# Patient Record
Sex: FEMALE | Race: WHITE | NOT HISPANIC OR LATINO | Employment: UNEMPLOYED | URBAN - METROPOLITAN AREA
[De-identification: names, ages, dates, MRNs, and addresses within clinical notes are randomized per-mention and may not be internally consistent; named-entity substitution may affect disease eponyms.]

---

## 2023-05-22 LAB
ANION GAP IN SER/PLAS: 10 MMOL/L (ref 10–20)
CALCIUM (MG/DL) IN SER/PLAS: 10.5 MG/DL (ref 8.6–10.6)
CARBON DIOXIDE, TOTAL (MMOL/L) IN SER/PLAS: 28 MMOL/L (ref 21–32)
CHLORIDE (MMOL/L) IN SER/PLAS: 105 MMOL/L (ref 98–107)
CHOLESTEROL (MG/DL) IN SER/PLAS: 211 MG/DL (ref 0–199)
CHOLESTEROL IN HDL (MG/DL) IN SER/PLAS: 56.1 MG/DL
CHOLESTEROL/HDL RATIO: 3.8
CREATININE (MG/DL) IN SER/PLAS: 0.75 MG/DL (ref 0.5–1.05)
ESTIMATED AVERAGE GLUCOSE FOR HBA1C: 143 MG/DL
GFR FEMALE: >90 ML/MIN/1.73M2
GLUCOSE (MG/DL) IN SER/PLAS: 123 MG/DL (ref 74–99)
HEMOGLOBIN A1C/HEMOGLOBIN TOTAL IN BLOOD: 6.6 %
LDL: 134 MG/DL (ref 0–99)
POTASSIUM (MMOL/L) IN SER/PLAS: 4.1 MMOL/L (ref 3.5–5.3)
SODIUM (MMOL/L) IN SER/PLAS: 139 MMOL/L (ref 136–145)
THYROTROPIN (MIU/L) IN SER/PLAS BY DETECTION LIMIT <= 0.05 MIU/L: 1.5 MIU/L (ref 0.44–3.98)
TRIGLYCERIDE (MG/DL) IN SER/PLAS: 107 MG/DL (ref 0–149)
UREA NITROGEN (MG/DL) IN SER/PLAS: 9 MG/DL (ref 6–23)
VLDL: 21 MG/DL (ref 0–40)

## 2023-07-19 LAB
APPEARANCE, URINE: NORMAL
BASOPHILS (10*3/UL) IN BLOOD BY AUTOMATED COUNT: 0.07 X10E9/L (ref 0–0.1)
BASOPHILS/100 LEUKOCYTES IN BLOOD BY AUTOMATED COUNT: 0.9 % (ref 0–2)
BILIRUBIN, URINE: NEGATIVE
BLOOD, URINE: NEGATIVE
CALCIDIOL (25 OH VITAMIN D3) (NG/ML) IN SER/PLAS: 22 NG/ML
COLOR, URINE: YELLOW
EOSINOPHILS (10*3/UL) IN BLOOD BY AUTOMATED COUNT: 0.09 X10E9/L (ref 0–0.7)
EOSINOPHILS/100 LEUKOCYTES IN BLOOD BY AUTOMATED COUNT: 1.1 % (ref 0–6)
ERYTHROCYTE DISTRIBUTION WIDTH (RATIO) BY AUTOMATED COUNT: 13.1 % (ref 11.5–14.5)
ERYTHROCYTE MEAN CORPUSCULAR HEMOGLOBIN CONCENTRATION (G/DL) BY AUTOMATED: 31.7 G/DL (ref 32–36)
ERYTHROCYTE MEAN CORPUSCULAR VOLUME (FL) BY AUTOMATED COUNT: 89 FL (ref 80–100)
ERYTHROCYTES (10*6/UL) IN BLOOD BY AUTOMATED COUNT: 4.58 X10E12/L (ref 4–5.2)
GLUCOSE, URINE: NEGATIVE MG/DL
HEMATOCRIT (%) IN BLOOD BY AUTOMATED COUNT: 40.7 % (ref 36–46)
HEMOGLOBIN (G/DL) IN BLOOD: 12.9 G/DL (ref 12–16)
IMMATURE GRANULOCYTES/100 LEUKOCYTES IN BLOOD BY AUTOMATED COUNT: 0.3 % (ref 0–0.9)
KETONES, URINE: NEGATIVE MG/DL
LEUKOCYTE ESTERASE, URINE: NEGATIVE
LEUKOCYTES (10*3/UL) IN BLOOD BY AUTOMATED COUNT: 7.9 X10E9/L (ref 4.4–11.3)
LYMPHOCYTES (10*3/UL) IN BLOOD BY AUTOMATED COUNT: 2 X10E9/L (ref 1.2–4.8)
LYMPHOCYTES/100 LEUKOCYTES IN BLOOD BY AUTOMATED COUNT: 25.2 % (ref 13–44)
MONOCYTES (10*3/UL) IN BLOOD BY AUTOMATED COUNT: 0.61 X10E9/L (ref 0.1–1)
MONOCYTES/100 LEUKOCYTES IN BLOOD BY AUTOMATED COUNT: 7.7 % (ref 2–10)
NEUTROPHILS (10*3/UL) IN BLOOD BY AUTOMATED COUNT: 5.15 X10E9/L (ref 1.2–7.7)
NEUTROPHILS/100 LEUKOCYTES IN BLOOD BY AUTOMATED COUNT: 64.8 % (ref 40–80)
NITRITE, URINE: NEGATIVE
NRBC (PER 100 WBCS) BY AUTOMATED COUNT: 0 /100 WBC (ref 0–0)
PH, URINE: 6 (ref 5–8)
PLATELETS (10*3/UL) IN BLOOD AUTOMATED COUNT: 356 X10E9/L (ref 150–450)
PROTEIN, URINE: NEGATIVE MG/DL
SPECIFIC GRAVITY, URINE: 1.02 (ref 1–1.03)
UROBILINOGEN, URINE: <2 MG/DL (ref 0–1.9)

## 2023-11-20 ENCOUNTER — TELEPHONE (OUTPATIENT)
Dept: PRIMARY CARE | Facility: CLINIC | Age: 63
End: 2023-11-20
Payer: COMMERCIAL

## 2023-11-20 DIAGNOSIS — E03.9 HYPOTHYROIDISM, UNSPECIFIED TYPE: Primary | ICD-10-CM

## 2023-11-20 RX ORDER — LEVOTHYROXINE SODIUM 125 UG/1
125 TABLET ORAL DAILY
COMMUNITY
End: 2023-11-20 | Stop reason: SDUPTHER

## 2023-11-20 RX ORDER — LEVOTHYROXINE SODIUM 125 UG/1
125 TABLET ORAL DAILY
Qty: 90 TABLET | Refills: 3 | Status: SHIPPED | OUTPATIENT
Start: 2023-11-20 | End: 2024-11-19

## 2023-11-21 ENCOUNTER — HOSPITAL ENCOUNTER (OUTPATIENT)
Dept: RADIOLOGY | Facility: EXTERNAL LOCATION | Age: 63
Discharge: HOME | End: 2023-11-21

## 2023-11-21 ENCOUNTER — OFFICE VISIT (OUTPATIENT)
Dept: PRIMARY CARE | Facility: CLINIC | Age: 63
End: 2023-11-21
Payer: COMMERCIAL

## 2023-11-21 VITALS
DIASTOLIC BLOOD PRESSURE: 77 MMHG | SYSTOLIC BLOOD PRESSURE: 95 MMHG | BODY MASS INDEX: 31.98 KG/M2 | WEIGHT: 187.3 LBS | TEMPERATURE: 97.7 F | HEART RATE: 105 BPM | OXYGEN SATURATION: 97 % | HEIGHT: 64 IN

## 2023-11-21 DIAGNOSIS — R92.8 ABNORMAL MAMMOGRAM: ICD-10-CM

## 2023-11-21 DIAGNOSIS — Z12.39 ENCOUNTER FOR SCREENING FOR MALIGNANT NEOPLASM OF BREAST, UNSPECIFIED SCREENING MODALITY: ICD-10-CM

## 2023-11-21 DIAGNOSIS — E11.9 TYPE 2 DIABETES MELLITUS WITHOUT COMPLICATION, WITHOUT LONG-TERM CURRENT USE OF INSULIN (MULTI): Primary | ICD-10-CM

## 2023-11-21 DIAGNOSIS — Z00.00 ANNUAL PHYSICAL EXAM: ICD-10-CM

## 2023-11-21 DIAGNOSIS — I10 HYPERTENSION, UNSPECIFIED TYPE: ICD-10-CM

## 2023-11-21 DIAGNOSIS — R01.1 MURMUR: ICD-10-CM

## 2023-11-21 PROCEDURE — 3044F HG A1C LEVEL LT 7.0%: CPT | Performed by: NURSE PRACTITIONER

## 2023-11-21 PROCEDURE — 3078F DIAST BP <80 MM HG: CPT | Performed by: NURSE PRACTITIONER

## 2023-11-21 PROCEDURE — 99214 OFFICE O/P EST MOD 30 MIN: CPT | Performed by: NURSE PRACTITIONER

## 2023-11-21 PROCEDURE — 1036F TOBACCO NON-USER: CPT | Performed by: NURSE PRACTITIONER

## 2023-11-21 PROCEDURE — 3074F SYST BP LT 130 MM HG: CPT | Performed by: NURSE PRACTITIONER

## 2023-11-21 PROCEDURE — 4010F ACE/ARB THERAPY RXD/TAKEN: CPT | Performed by: NURSE PRACTITIONER

## 2023-11-21 RX ORDER — MULTIVIT-MIN/IRON FUM/FOLIC AC 7.5 MG-4
1 TABLET ORAL DAILY
COMMUNITY
End: 2024-02-26 | Stop reason: ALTCHOICE

## 2023-11-21 RX ORDER — CHOLECALCIFEROL (VITAMIN D3) 25 MCG
1000 TABLET ORAL 2 TIMES DAILY
COMMUNITY
Start: 2022-08-18 | End: 2024-02-26 | Stop reason: ALTCHOICE

## 2023-11-21 RX ORDER — LISINOPRIL 2.5 MG/1
2.5 TABLET ORAL DAILY
COMMUNITY
Start: 2023-11-14 | End: 2024-01-31 | Stop reason: WASHOUT

## 2023-11-21 ASSESSMENT — PATIENT HEALTH QUESTIONNAIRE - PHQ9
2. FEELING DOWN, DEPRESSED OR HOPELESS: NOT AT ALL
SUM OF ALL RESPONSES TO PHQ9 QUESTIONS 1 AND 2: 0
1. LITTLE INTEREST OR PLEASURE IN DOING THINGS: NOT AT ALL

## 2023-11-21 ASSESSMENT — ENCOUNTER SYMPTOMS
NERVOUS/ANXIOUS: 0
SHORTNESS OF BREATH: 0
ABDOMINAL PAIN: 0
ARTHRALGIAS: 0
DYSURIA: 0
WHEEZING: 0
VOMITING: 0
HEMATURIA: 0
APPETITE CHANGE: 0
CONSTIPATION: 0
EYE PAIN: 0
EYE ITCHING: 0
NUMBNESS: 0
WOUND: 0
ABDOMINAL DISTENTION: 0
UNEXPECTED WEIGHT CHANGE: 0
CHILLS: 0
DIARRHEA: 0
COUGH: 0
ACTIVITY CHANGE: 0
FEVER: 0
FATIGUE: 0
FREQUENCY: 0
PALPITATIONS: 0

## 2023-11-21 NOTE — PATIENT INSTRUCTIONS
1. Monitor blood pressure x 1-2 weeks and call with result   2.Obtain blood work   3. Schedule for mammogram   4.Schedule echo to further evaluate the cardiac murmur

## 2023-11-21 NOTE — PROGRESS NOTES
Chief Complaint  Hypertension.    History Of Present Illness  Kimberley Murillo is a 62 y.o. female presents today for follow up of Hypertension and diabetes.    States BP Has been slightly higher than baseline over last 3 days.  Has been experiencing more stress.  Today did take one extra dose of lisinopril.  Tolerating Medication and is compliant.         Diabetes  -Managed by lifestyle and diet. Hemoglobin A1c on 5/22/2023 was 6.6.   -working on improving dietary choices   -Ophthalmology follow up yearly in [place]-due in December 2024.   -Denies numbness, tingling, parasthesias    HM  Hx of abnormal mammogram in past.  Worked up w diagnostic mammogram and US- Right breast with ? Architectural distortion/focal asymmetry -a 6-month follow-up was recommended- did not complete - states plans to complete now  Colonoscopy completed 3/2023- due in 3033     Review of Systems  Review of Systems   Constitutional:  Negative for activity change, appetite change, chills, fatigue, fever and unexpected weight change.   HENT:  Negative for congestion.    Eyes:  Negative for pain and itching.   Respiratory:  Negative for cough, shortness of breath and wheezing.    Cardiovascular:  Negative for chest pain, palpitations and leg swelling.   Gastrointestinal:  Negative for abdominal distention, abdominal pain, constipation, diarrhea and vomiting.   Genitourinary:  Negative for dysuria, frequency, hematuria and urgency.   Musculoskeletal:  Negative for arthralgias and gait problem.   Skin:  Negative for rash and wound.   Neurological:  Negative for numbness.   Psychiatric/Behavioral:  The patient is not nervous/anxious.        Past Medical History  She has a past medical history of Headache, unspecified, Personal history of diseases of the blood and blood-forming organs and certain disorders involving the immune mechanism, and Personal history of pneumonia (recurrent).    Surgical History  She has a past surgical history that includes  "Other surgical history (11/03/2022); Other surgical history (11/03/2022); Other surgical history (11/03/2022); Other surgical history (11/03/2022); Other surgical history (11/03/2022); Other surgical history (11/03/2022); and Other surgical history (11/03/2022).    Family History  No family history on file.     Social History  She reports that she has never smoked. She has never used smokeless tobacco. No history on file for alcohol use and drug use.    Allergies  Meloxicam, Salsalate, Statins-hmg-coa reductase inhibitors, Adhesive, and Latex    Medications  Current Outpatient Medications   Medication Instructions    cholecalciferol (Vitamin D-3) 25 MCG (1000 UT) tablet 1 tablet, oral, Daily    lisinopril 2.5 mg, oral, Daily    multivitamin with minerals tablet 1 tablet, oral, Daily    Unithroid 125 mcg, oral, Daily        Objective   BP 95/77   Pulse 105   Temp 36.5 °C (97.7 °F)   Ht 1.626 m (5' 4\")   Wt 85 kg (187 lb 4.8 oz)   SpO2 97%   BMI 32.15 kg/m²    BMI: Estimated body mass index is 32.15 kg/m² as calculated from the following:    Height as of this encounter: 1.626 m (5' 4\").    Weight as of this encounter: 85 kg (187 lb 4.8 oz).    Physical Exam  Physical Exam  Vitals and nursing note reviewed.   Constitutional:       Appearance: Normal appearance.   HENT:      Head: Normocephalic and atraumatic.      Nose: Nose normal.      Mouth/Throat:      Mouth: Mucous membranes are moist.      Pharynx: Oropharynx is clear.   Eyes:      Extraocular Movements: Extraocular movements intact.      Conjunctiva/sclera: Conjunctivae normal.      Pupils: Pupils are equal, round, and reactive to light.      Comments: glasses   Cardiovascular:      Rate and Rhythm: Normal rate and regular rhythm.      Pulses: Normal pulses.      Heart sounds: Murmur heard.   Pulmonary:      Effort: Pulmonary effort is normal.      Breath sounds: Normal breath sounds.   Abdominal:      General: Bowel sounds are normal.      Palpations: " Abdomen is soft.      Tenderness: There is no abdominal tenderness.   Musculoskeletal:         General: Normal range of motion.      Cervical back: Neck supple.   Skin:     General: Skin is warm and dry.   Neurological:      General: No focal deficit present.      Mental Status: She is alert and oriented to person, place, and time. Mental status is at baseline.   Psychiatric:         Mood and Affect: Mood normal.         Behavior: Behavior normal.         Thought Content: Thought content normal.         Judgment: Judgment normal.         Relevant Results and Imaging  Orders Only on 07/19/2023   Component Date Value Ref Range Status    WBC 07/19/2023 7.9  4.4 - 11.3 x10E9/L Final    nRBC 07/19/2023 0.0  0.0 - 0.0 /100 WBC Final    RBC 07/19/2023 4.58  4.00 - 5.20 x10E12/L Final    Hemoglobin 07/19/2023 12.9  12.0 - 16.0 g/dL Final    Hematocrit 07/19/2023 40.7  36.0 - 46.0 % Final    MCV 07/19/2023 89  80 - 100 fL Final    MCHC 07/19/2023 31.7 (L)  32.0 - 36.0 g/dL Final    Platelets 07/19/2023 356  150 - 450 x10E9/L Final    RDW 07/19/2023 13.1  11.5 - 14.5 % Final    Neutrophils % 07/19/2023 64.8  40.0 - 80.0 % Final    Immature Granulocytes %, Automated 07/19/2023 0.3  0.0 - 0.9 % Final    Comment:  Immature Granulocyte Count (IG) includes promyelocytes,    myelocytes and metamyelocytes but does not include bands.   Percent differential counts (%) should be interpreted in the   context of the absolute cell counts (cells/L).      Lymphocytes % 07/19/2023 25.2  13.0 - 44.0 % Final    Monocytes % 07/19/2023 7.7  2.0 - 10.0 % Final    Eosinophils % 07/19/2023 1.1  0.0 - 6.0 % Final    Basophils % 07/19/2023 0.9  0.0 - 2.0 % Final    Neutrophils Absolute 07/19/2023 5.15  1.20 - 7.70 x10E9/L Final    Lymphocytes Absolute 07/19/2023 2.00  1.20 - 4.80 x10E9/L Final    Monocytes Absolute 07/19/2023 0.61  0.10 - 1.00 x10E9/L Final    Eosinophils Absolute 07/19/2023 0.09  0.00 - 0.70 x10E9/L Final    Basophils Absolute  07/19/2023 0.07  0.00 - 0.10 x10E9/L Final    Color, Urine 07/19/2023 YELLOW  STRAW,YELLOW Final    Appearance, Urine 07/19/2023 HAZY  CLEAR Final    Specific Gravity, Urine 07/19/2023 1.018  1.005 - 1.035 Final    pH, Urine 07/19/2023 6.0  5.0 - 8.0 Final    Protein, Urine 07/19/2023 NEGATIVE  NEGATIVE mg/dL Final    Glucose, Urine 07/19/2023 NEGATIVE  NEGATIVE mg/dL Final    Blood, Urine 07/19/2023 NEGATIVE  NEGATIVE Final    Ketones, Urine 07/19/2023 NEGATIVE  NEGATIVE mg/dL Final    Bilirubin, Urine 07/19/2023 NEGATIVE  NEGATIVE Final    Urobilinogen, Urine 07/19/2023 <2.0  0.0 - 1.9 mg/dL Final    Nitrite, Urine 07/19/2023 NEGATIVE  NEGATIVE Final    Leukocyte Esterase, Urine 07/19/2023 NEGATIVE  NEGATIVE Final    Vitamin D, 25-Hydroxy 07/19/2023 22 (A)  ng/mL Final    Comment: .  DEFICIENCY:         < 20   NG/ML  INSUFFICIENCY:      20-29  NG/ML  SUFFICIENCY:         NG/ML    THIS ASSAY ACCURATELY QUANTIFIES THE SUM OF  VITAMIN D3, 25-HYDROXY AND VIT D2,25-HYDROXY.     Orders Only on 05/22/2023   Component Date Value Ref Range Status    Hemoglobin A1C 05/22/2023 6.6 (A)  % Final    Comment:      Diagnosis of Diabetes-Adults   Non-Diabetic: < or = 5.6%   Increased risk for developing diabetes: 5.7-6.4%   Diagnostic of diabetes: > or = 6.5%  .       Monitoring of Diabetes                Age (y)     Therapeutic Goal (%)   Adults:          >18           <7.0   Pediatrics:    13-18           <7.5                   7-12           <8.0                   0- 6            7.5-8.5   American Diabetes Association. Diabetes Care 33(S1), Jan 2010.      Estimated Average Glucose 05/22/2023 143  MG/DL Final   Orders Only on 05/22/2023   Component Date Value Ref Range Status    Cholesterol 05/22/2023 211 (H)  0 - 199 mg/dL Final    Comment: .      AGE      DESIRABLE   BORDERLINE HIGH   HIGH     0-19 Y     0 - 169       170 - 199     >/= 200    20-24 Y     0 - 189       190 - 224     >/= 225         >24 Y     0 - 199        200 - 239     >/= 240   **All ranges are based on fasting samples. Specific   therapeutic targets will vary based on patient-specific   cardiac risk.  .   Pediatric guidelines reference:Pediatrics 2011, 128(S5).   Adult guidelines reference: NCEP ATPIII Guidelines,     WADE 2001, 258:2486-97  .   Venipuncture immediately after or during the    administration of Metamizole may lead to falsely   low results. Testing should be performed immediately   prior to Metamizole dosing.      HDL 05/22/2023 56.1  mg/dL Final    Comment: .      AGE      VERY LOW   LOW     NORMAL    HIGH       0-19 Y       < 35   < 40     40-45     ----    20-24 Y       ----   < 40       >45     ----      >24 Y       ----   < 40     40-60      >60  .      Cholesterol/HDL Ratio 05/22/2023 3.8   Final    Comment: REF VALUES  DESIRABLE  < 3.4  HIGH RISK  > 5.0      LDL 05/22/2023 134 (H)  0 - 99 mg/dL Final    Comment: .                           NEAR      BORD      AGE      DESIRABLE  OPTIMAL    HIGH     HIGH     VERY HIGH     0-19 Y     0 - 109     ---    110-129   >/= 130     ----    20-24 Y     0 - 119     ---    120-159   >/= 160     ----      >24 Y     0 -  99   100-129  130-159   160-189     >/=190  .      VLDL 05/22/2023 21  0 - 40 mg/dL Final    Triglycerides 05/22/2023 107  0 - 149 mg/dL Final    Comment: .      AGE      DESIRABLE   BORDERLINE HIGH   HIGH     VERY HIGH   0 D-90 D    19 - 174         ----         ----        ----  91 D- 9 Y     0 -  74        75 -  99     >/= 100      ----    10-19 Y     0 -  89        90 - 129     >/= 130      ----    20-24 Y     0 - 114       115 - 149     >/= 150      ----         >24 Y     0 - 149       150 - 199    200- 499    >/= 500  .   Venipuncture immediately after or during the    administration of Metamizole may lead to falsely   low results. Testing should be performed immediately   prior to Metamizole dosing.     Orders Only on 05/22/2023   Component Date Value Ref Range Status    TSH  05/22/2023 1.50  0.44 - 3.98 mIU/L Final    Comment:  TSH testing is performed using different testing    methodology at [facility] than at other    [facility] hospitals. Direct result comparisons should    only be made within the same method.     Orders Only on 05/22/2023   Component Date Value Ref Range Status    Glucose 05/22/2023 123 (H)  74 - 99 mg/dL Final    Sodium 05/22/2023 139  136 - 145 mmol/L Final    Potassium 05/22/2023 4.1  3.5 - 5.3 mmol/L Final    Chloride 05/22/2023 105  98 - 107 mmol/L Final    Bicarbonate 05/22/2023 28  21 - 32 mmol/L Final    Anion Gap 05/22/2023 10  10 - 20 mmol/L Final    Urea Nitrogen 05/22/2023 9  6 - 23 mg/dL Final    Creatinine 05/22/2023 0.75  0.50 - 1.05 mg/dL Final    GFR Female 05/22/2023 >90  >90 mL/min/1.73m2 Final    Comment:  CALCULATIONS OF ESTIMATED GFR ARE PERFORMED   USING THE 2021 CKD-EPI STUDY REFIT EQUATION   WITHOUT THE RACE VARIABLE FOR THE IDMS-TRACEABLE   CREATININE METHODS.    https://jasn.asnjournals.org/content/early/2021/09/22/ASN.6333075270      Calcium 05/22/2023 10.5  8.6 - 10.6 mg/dL Final     No images are attached to the encounter.        Assessment and Plan  Assessment/Plan   Problem List Items Addressed This Visit             ICD-10-CM    Type 2 diabetes mellitus without complication, without long-term current use of insulin (CMS/[facility]) - Primary E11.9     -Managed by lifestyle and diet. Hemoglobin A1c on 5/22/2023 was 6.6.   -working on improving dietary choices   -Ophthalmology follow up yearly in [place]-due in December 2024.            Relevant Orders    Comprehensive Metabolic Panel    Hemoglobin A1C    Hypertension I10     Monitor blood pressure x 1-2 weeks and call with result           Relevant Orders    Comprehensive Metabolic Panel    Murmur R01.1     Schedule echo to further evaluate the cardiac murmur          Relevant Orders    Transthoracic Echo (TTE) Complete    Abnormal mammogram R92.8     -diagnostic B mammogram and  US R breast          Relevant Orders    BI mammo bilateral diagnostic tomosynthesis    BI US breast complete right     Other Visit Diagnoses         Codes    Annual physical exam     Z00.00    Relevant Orders    Follow Up In Primary Care - Health Maintenance    Encounter for screening for malignant neoplasm of breast, unspecified screening modality     Z12.39    Relevant Orders    BI mammo bilateral screening tomosynthesis (Completed)

## 2023-11-26 PROBLEM — R92.8 ABNORMAL MAMMOGRAM: Status: ACTIVE | Noted: 2023-11-26

## 2023-11-26 PROBLEM — R01.1 MURMUR: Status: ACTIVE | Noted: 2023-11-26

## 2023-11-26 PROBLEM — I10 HYPERTENSION: Status: ACTIVE | Noted: 2023-11-26

## 2023-11-26 NOTE — ASSESSMENT & PLAN NOTE
-Managed by lifestyle and diet. Hemoglobin A1c on 5/22/2023 was 6.6.   -working on improving dietary choices   -Ophthalmology follow up yearly in [place]-due in December 2024.

## 2023-12-05 ENCOUNTER — ANCILLARY PROCEDURE (OUTPATIENT)
Dept: RADIOLOGY | Facility: CLINIC | Age: 63
End: 2023-12-05
Payer: COMMERCIAL

## 2023-12-05 DIAGNOSIS — R92.8 ABNORMAL MAMMOGRAM: ICD-10-CM

## 2023-12-05 DIAGNOSIS — R92.8 ABNORMAL MAMMOGRAM: Primary | ICD-10-CM

## 2023-12-05 PROCEDURE — 77066 DX MAMMO INCL CAD BI: CPT | Performed by: STUDENT IN AN ORGANIZED HEALTH CARE EDUCATION/TRAINING PROGRAM

## 2023-12-05 PROCEDURE — 76642 ULTRASOUND BREAST LIMITED: CPT | Performed by: STUDENT IN AN ORGANIZED HEALTH CARE EDUCATION/TRAINING PROGRAM

## 2023-12-05 PROCEDURE — 76642 ULTRASOUND BREAST LIMITED: CPT | Mod: RT

## 2023-12-05 PROCEDURE — 77062 BREAST TOMOSYNTHESIS BI: CPT

## 2023-12-05 PROCEDURE — G0279 TOMOSYNTHESIS, MAMMO: HCPCS | Performed by: STUDENT IN AN ORGANIZED HEALTH CARE EDUCATION/TRAINING PROGRAM

## 2023-12-07 ENCOUNTER — HOSPITAL ENCOUNTER (OUTPATIENT)
Dept: CARDIOLOGY | Facility: CLINIC | Age: 63
Discharge: HOME | End: 2023-12-07
Payer: COMMERCIAL

## 2023-12-07 DIAGNOSIS — R01.1 MURMUR: ICD-10-CM

## 2023-12-07 PROCEDURE — 93306 TTE W/DOPPLER COMPLETE: CPT

## 2023-12-07 PROCEDURE — 93306 TTE W/DOPPLER COMPLETE: CPT | Performed by: INTERNAL MEDICINE

## 2023-12-09 LAB
AORTIC VALVE PEAK VELOCITY: 1.59
AV PEAK GRADIENT: 10.1
AVA (PEAK VEL): 3.54
EJECTION FRACTION APICAL 4 CHAMBER: 61.5
EJECTION FRACTION: 62
LEFT ATRIUM VOLUME AREA LENGTH INDEX BSA: 19.7
LEFT VENTRICLE INTERNAL DIMENSION DIASTOLE: 3.83 (ref 3.5–6)
LEFT VENTRICULAR OUTFLOW TRACT DIAMETER: 2.25
MITRAL VALVE E/A RATIO: 1.24
MITRAL VALVE E/E' RATIO: 9.8
RIGHT VENTRICLE FREE WALL PEAK S': 21
RIGHT VENTRICLE PEAK SYSTOLIC PRESSURE: 26.7
TRICUSPID ANNULAR PLANE SYSTOLIC EXCURSION: 2.1

## 2023-12-11 DIAGNOSIS — R92.8 ABNORMAL MAMMOGRAM: ICD-10-CM

## 2023-12-15 ENCOUNTER — TELEPHONE (OUTPATIENT)
Dept: RADIOLOGY | Facility: HOSPITAL | Age: 63
End: 2023-12-15
Payer: COMMERCIAL

## 2023-12-15 NOTE — TELEPHONE ENCOUNTER
left general message about date time location and parking , aware it is okay to eat drink and drive as long as not using sedating medications of have another appointment with different instructions, left nurse line number to call for further instructions.

## 2023-12-15 NOTE — PROGRESS NOTES
[facility]  Kimberley Murillo female   1960 63 y.o.  [id number]      Chief Complaint  New patient, biopsy consultation.    History Of Present Illness  Kimberley Murillo is a very pleasant 63 y.o. female seen in the breast center for biopsy consultation. She has a history of bilateral benign core biopsies at least five total and excision. She has two paternal aunts with breast cancer, 40's.    BREAST IMAGIN2023 Bilateral diagnostic mammogram with right breast ultrasound, indicates BI-RADS Category 4. Indeterminate right breast architectural distortion with questionable sonographic correlates at 9 o'clock 9 cm from the nipple  or 9 o'clock 10 cm from the nipple.  Further evaluation with surgical consultation, repeat real-time right breast and axillary ultrasound with elastography, and stereotactic/tomosynthesis-guided biopsy of the architectural distortion is recommended. Additional ultrasound-guided biopsy may be considered at the discretion of the interpreting radiologist. Oval masses in the right breast at the 9:30 and 10 o'clock positions, with long-term mammographic stability are considered benign. No additional imaging follow-up for this finding is recommended.    REPRODUCTIVE HISTORY: menarche age 17, , first birth age 30, did not breastfeed, OCP's x 3 years, natural menopause age 60, no HRT, scattered fibroglandular tissue    FAMILY CANCER HISTORY:   Paternal Aunt (1): Breast cancer, 40's  Paternal Aunt (2): Breast cancer, 40's    Review of Systems  Constitutional:  Negative for appetite change, fatigue, fever and unexpected weight change.   HENT:  Negative for ear pain, hearing loss, nosebleeds, sore throat and trouble swallowing.    Eyes:  Negative for discharge, itching and visual disturbance.   Breast: As stated in HPI.  Respiratory:  Negative for cough, chest tightness and shortness of breath.    Cardiovascular:  Negative for chest pain, palpitations and leg swelling.    Gastrointestinal:  Negative for abdominal pain, constipation, diarrhea and nausea.   Endocrine: Negative for cold intolerance and heat intolerance.   Genitourinary:  Negative for dysuria, frequency, hematuria, pelvic pain and vaginal bleeding.   Musculoskeletal:  Negative for arthralgias, back pain, gait problem, joint swelling and myalgias.   Skin:  Negative for color change and rash.   Allergic/Immunologic: Negative for environmental allergies and food allergies.   Neurological:  Negative for dizziness, tremors, speech difficulty, weakness, numbness and headaches.   Hematological:  Does not bruise/bleed easily.   Psychiatric/Behavioral:  Negative for agitation, dysphoric mood and sleep disturbance. The patient is not nervous/anxious.       Past Medical History  She has a past medical history of Headache, unspecified, Personal history of diseases of the blood and blood-forming organs and certain disorders involving the immune mechanism, and Personal history of pneumonia (recurrent).    Surgical History  She has a past surgical history that includes Other surgical history (11/03/2022); Other surgical history (11/03/2022); Other surgical history (11/03/2022); Other surgical history (11/03/2022); Other surgical history (11/03/2022); Other surgical history (11/03/2022); and Other surgical history (11/03/2022).    Family History  Cancer-related family history is not on file.     Social History  She reports that she has never smoked. She has never used smokeless tobacco. No history on file for alcohol use and drug use.    Allergies  Meloxicam, Salsalate, Statins-hmg-coa reductase inhibitors, Adhesive, and Latex    Medications  Current Outpatient Medications   Medication Instructions    cholecalciferol (Vitamin D-3) 25 MCG (1000 UT) tablet 1 tablet, oral, Daily    lisinopril 2.5 mg, oral, Daily    multivitamin with minerals tablet 1 tablet, oral, Daily    Unithroid 125 mcg, oral, Daily       Last Recorded Vitals  Pulse  84, temperature 36.2 °C (97.2 °F), weight 81.9 kg (180 lb 9.6 oz).  Patient reports her BP was 120/64 at home    Physical Exam  Patient is alert and oriented x3 and in a relaxed and appropriate mood. Her gait is steady and hand grasps are equal. Sclera is clear. The breasts are nearly symmetrical, large, and pendulous. The tissue is soft without palpable abnormalities, discrete nodules or masses. The skin and nipples appear normal. There is no cervical, supraclavicular or axillary lymphadenopathy. Heart rate and rhythm normal, S1 and S2 appreciated. The lungs are clear to auscultation bilaterally. Abdomen is soft and non-tender.      Relevant Results and Imaging  BI mammo bilateral diagnostic tomosynthesis 12/05/2023  BI US breast limited right 12/05/2023    Narrative  Interpreted By:  Lulu Grider and Avery Ross  STUDY:  BI MAMMO BILATERAL DIAGNOSTIC TOMOSYNTHESIS; BI US BREAST LIMITED  RIGHT;  12/5/2023 9:44 am; 12/5/2023 9:46 am    ACCESSION NUMBER(S):  [id number]; [id number]    ORDERING CLINICIAN:  EVELIA HA    INDICATION:  Annual mammogram and late follow-up of probably benign right breast  architectural distortion/focal asymmetry evaluated on 10/10/2022. The  patient reports focal right breast pain for several months. History  of multiple benign core right breast biopsies and benign excisional  left breast biopsy.    COMPARISON:  10/10/2022, 09/01/2022, 12/14/2014.    FINDINGS:  MAMMOGRAPHY: 2D and tomosynthesis images were reviewed at 1 mm slice  thickness.    Density:  There are areas of scattered fibroglandular tissue.    A radiopaque marker was placed on the skin by the patient at the site  of the patient's focal pain in the superolateral right breast. No  suspicious focal mammographic abnormality is seen underneath the  radiopaque marker. Of note, at the time of ultrasound exam patient  reported that the pain was in central right breast and at 9 o'clock,  10 cm from the nipple.    Tissue  markers are noted in the superolateral and central lateral  right breast at middle depth. Architectural distortion is again seen  in the central lateral right breast at middle depth, which is similar  to prior exams dating back to 09/01/2022. There are stable  circumscribed masses bilaterally.    Postsurgical scarring is seen in the superolateral left breast at  middle depth. No suspicious masses or calcifications are identified  in the left breast.    ULTRASOUND:  A targeted ultrasound of the right breast was performed  by a registered sonographer.    An irregular hypoechoic mass with indistinct margins is seen at the 9  o'clock position 9 cm from the nipple. The mass measures 1.1 x 0.5 x  0.9 cm, previously 0.7 x 0.4 x 0.7 cm on 10/10/2022 with size  difference likely secondary to differences in measurement technique.  There is question of internal vascularity. This may correspond with  the architectural distortion.    Please note there is a discrepancy in the area of focal pain between  the ultrasound and mammogram. The patient clarified the focal pain is  actually at the 9 o'clock position. In the area of the patient's  focal breast pain at the 9 o'clock position 10 cm from the nipple,  there is an irregular hypoechoic mass with posterior shadowing  measuring 1.2 x 1.0 x 0.8 cm. It is avascular. This may correspond  with architectural distortion.    An oval parallel circumscribed hypoechoic mass is identified at the  9:30 o'clock position 9 cm from the nipple. The mass is similar in  size compared to prior exam measuring 1.0 x 0.6 x 0.9 cm. No internal  vascularity is identified. This likely corresponds to a mammographic  mass which appears slightly smaller compared to prior mammogram dated  12/04/2014.    An additional oval parallel circumscribed hypoechoic mass is noted at  the 10 o'clock position 11 cm from the nipple. The mass measures 1.0  x 0.5 x 0.6 cm. It is avascular. This mass corresponds with  a  circumscribed mass in the superolateral right breast at middle depth  on mammogram that has been stable since 12/14/2014.    Impression  1. Indeterminate right breast architectural distortion with  questionable sonographic correlates at 9 o'clock 9 cm from the nipple  or 9 o'clock 10 cm from the nipple.  Further evaluation with surgical  consultation, repeat real-time right breast and axillary ultrasound  with elastography, and stereotactic/tomosynthesis-guided biopsy of  the architectural distortion is recommended. Additional  ultrasound-guided biopsy may be considered at the discretion of the  interpreting radiologist.  2. Oval masses in the right breast at the 9:30 and 10 o'clock  positions, with long-term mammographic stability are considered  benign. No additional imaging follow-up for this finding is  recommended.  3. No mammographic evidence of malignancy in the left breast.    Dr. Lulu Grider explained the findings and recommendations with  the patient at the time of exam via the telephone. A message was sent  to the referring practitioner at the time of this dictation regarding  these critical findings using the Epic notification system. A  pre-procedure form was filled out.    Method of Detection: Category Sdbt - 3D Screening    BI-RADS CATEGORY:    BI-RADS Category:  4 Suspicious.  Recommendation:  Biopsy.  Recommended Date:  Immediate.  Laterality:  Right.    For any future breast imaging appointments, please call [phone]).      MACRO:  Critical Finding:  Breast Imaging Abnormality. Notification was  initiated on 12/5/2023 at 10:18 am by  Ren Licona.  (**-YCF-**)  Instructions:  Surgical Consultation and Imaging Guided Biopsy.    Signed by: Lulu Grider 12/5/2023 11:02 AM  Dictation workstation:   [id number]      Time was spent viewing digital images. I explained the results in depth, along with suggested explanation for follow up recommendations based on the testing results.  BI-RADS Category 4    Visit Diagnosis  1. Abnormal finding on breast imaging          Assessment/Plan  Abnormal mammogram, right breast architectural distortion, benign bilateral core biopsies with cyst excisions, family history of breast cancer, scattered fibroglandular tissue    Plan:  Right breast stereotactic guided core biopsy with second look ultrasound and possible ultrasound guided core biopsy.    Patient Discussion/Summary  I recommend a right breast mammogram guided core biopsy with second look ultrasound and possible ultrasound guided core biopsy. A breast radiology physician will perform the procedure. Possible diagnoses include benign, atypia or cancer. Bruising and mild discomfort after the biopsy is normal and will improve. I typically have results in 5-7 business days. I will call you with the results, please have your phone handy to take my call. I will provide recommendations for future follow up based on your biopsy results.     IMPORTANT INFORMATION REGARDING YOUR RESULTS    If you receive medical information from My [facility] Personal Health Record (online chart) your results will be released into your chart. This means you may view or see results of your biopsy or procedure before I contact you directly. If this occurs, please call the office and we will discuss your results over the phone.    You can see your health information, review clinical summaries from office visits & test results online when you follow your health with MY  Chart, a personal health record. To sign up go to [email or web address]. If you need assistance with signing up or trouble getting into your account call [facility] Patient Line 24/7 at [phone].    My office phone number is [phone]  if you need to get in touch with me or have additional questions or concerns. Thank you for choosing [facility] and trusting me as your healthcare provider. I look forward to seeing you again at your next office  visit. I am honored to be a provider on your health care team and I remain dedicated to helping you achieve your health goals.       Melissa Eubanks, APRN-CNP

## 2023-12-18 ENCOUNTER — APPOINTMENT (OUTPATIENT)
Dept: RADIOLOGY | Facility: HOSPITAL | Age: 63
End: 2023-12-18
Payer: COMMERCIAL

## 2023-12-18 ENCOUNTER — PROCEDURE VISIT (OUTPATIENT)
Dept: SURGICAL ONCOLOGY | Facility: HOSPITAL | Age: 63
End: 2023-12-18
Payer: COMMERCIAL

## 2023-12-18 ENCOUNTER — HOSPITAL ENCOUNTER (OUTPATIENT)
Dept: RADIOLOGY | Facility: HOSPITAL | Age: 63
Discharge: HOME | End: 2023-12-18
Payer: COMMERCIAL

## 2023-12-18 VITALS — TEMPERATURE: 97.2 F | HEART RATE: 84 BPM | BODY MASS INDEX: 31 KG/M2 | WEIGHT: 180.6 LBS

## 2023-12-18 DIAGNOSIS — R92.8 OTHER ABNORMAL AND INCONCLUSIVE FINDINGS ON DIAGNOSTIC IMAGING OF BREAST: ICD-10-CM

## 2023-12-18 DIAGNOSIS — R92.8 ABNORMAL FINDING ON BREAST IMAGING: Primary | ICD-10-CM

## 2023-12-18 DIAGNOSIS — R92.8 ABNORMAL FINDING ON BREAST IMAGING: ICD-10-CM

## 2023-12-18 PROCEDURE — 99204 OFFICE O/P NEW MOD 45 MIN: CPT | Performed by: NURSE PRACTITIONER

## 2023-12-18 PROCEDURE — 96372 THER/PROPH/DIAG INJ SC/IM: CPT | Performed by: RADIOLOGY

## 2023-12-18 PROCEDURE — 19081 BX BREAST 1ST LESION STRTCTC: CPT | Mod: RIGHT SIDE | Performed by: RADIOLOGY

## 2023-12-18 PROCEDURE — 77065 DX MAMMO INCL CAD UNI: CPT

## 2023-12-18 PROCEDURE — 76642 ULTRASOUND BREAST LIMITED: CPT | Mod: RT

## 2023-12-18 PROCEDURE — 77065 DX MAMMO INCL CAD UNI: CPT | Mod: RIGHT SIDE | Performed by: RADIOLOGY

## 2023-12-18 PROCEDURE — 2720000007 HC OR 272 NO HCPCS

## 2023-12-18 PROCEDURE — 76642 ULTRASOUND BREAST LIMITED: CPT | Mod: RIGHT SIDE | Performed by: RADIOLOGY

## 2023-12-18 PROCEDURE — 2500000005 HC RX 250 GENERAL PHARMACY W/O HCPCS: Performed by: RADIOLOGY

## 2023-12-18 PROCEDURE — 76982 USE 1ST TARGET LESION: CPT | Mod: RT

## 2023-12-18 PROCEDURE — 88305 TISSUE EXAM BY PATHOLOGIST: CPT | Performed by: PATHOLOGY

## 2023-12-18 PROCEDURE — 19081 BX BREAST 1ST LESION STRTCTC: CPT | Mod: RT

## 2023-12-18 PROCEDURE — 99214 OFFICE O/P EST MOD 30 MIN: CPT | Mod: 25 | Performed by: NURSE PRACTITIONER

## 2023-12-18 PROCEDURE — 88305 TISSUE EXAM BY PATHOLOGIST: CPT | Mod: TC,SUR | Performed by: NURSE PRACTITIONER

## 2023-12-18 RX ORDER — LIDOCAINE HYDROCHLORIDE 10 MG/ML
10 INJECTION, SOLUTION EPIDURAL; INFILTRATION; INTRACAUDAL; PERINEURAL ONCE
OUTPATIENT
Start: 2023-12-18 | End: 2023-12-18

## 2023-12-18 RX ORDER — LIDOCAINE HYDROCHLORIDE 10 MG/ML
10 INJECTION, SOLUTION EPIDURAL; INFILTRATION; INTRACAUDAL; PERINEURAL ONCE
Status: COMPLETED | OUTPATIENT
Start: 2023-12-18 | End: 2023-12-18

## 2023-12-18 RX ADMIN — LIDOCAINE HYDROCHLORIDE 11 ML: 10 INJECTION, SOLUTION EPIDURAL; INFILTRATION; INTRACAUDAL; PERINEURAL at 09:47

## 2023-12-18 ASSESSMENT — PAIN SCALES - GENERAL
PAINLEVEL: 10-WORST PAIN EVER
PAINLEVEL_OUTOF10: 0 - NO PAIN
PAINLEVEL_OUTOF10: 5 - MODERATE PAIN

## 2023-12-18 ASSESSMENT — PAIN - FUNCTIONAL ASSESSMENT: PAIN_FUNCTIONAL_ASSESSMENT: 0-10

## 2023-12-18 ASSESSMENT — PAIN DESCRIPTION - DESCRIPTORS: DESCRIPTORS: THROBBING

## 2023-12-18 NOTE — Clinical Note
Patient tolerated the right stereotactic bx well:  no pain during procedure:  aromatherapy and some guided imagery given:  Debrief at 10:58 am:  clip films done:  pressure held x 10 minutes and pressure dressing applied.   Patient stated she did much better than her last biopsy which was  15 yrs ago.

## 2023-12-19 ENCOUNTER — TELEPHONE (OUTPATIENT)
Dept: RADIOLOGY | Facility: HOSPITAL | Age: 63
End: 2023-12-19
Payer: COMMERCIAL

## 2023-12-19 NOTE — TELEPHONE ENCOUNTER
Patient called after her biopsy 12/18/23 to say that she was fine and had no complications or pain .

## 2023-12-21 ENCOUNTER — TELEPHONE (OUTPATIENT)
Dept: SURGICAL ONCOLOGY | Facility: HOSPITAL | Age: 63
End: 2023-12-21
Payer: COMMERCIAL

## 2023-12-21 DIAGNOSIS — R92.8 ABNORMAL FINDING ON BREAST IMAGING: Primary | ICD-10-CM

## 2023-12-21 LAB
LABORATORY COMMENT REPORT: NORMAL
PATH REPORT.FINAL DX SPEC: NORMAL
PATH REPORT.GROSS SPEC: NORMAL
PATH REPORT.RELEVANT HX SPEC: NORMAL
PATH REPORT.TOTAL CANCER: NORMAL

## 2023-12-21 NOTE — TELEPHONE ENCOUNTER
"Result Communication    Spoke with Kimberley Murillo regarding breast biopsy results., benign. Awaiting concordance report, patient aware. Office will call to schedule 6 month follow up right breast ultrasound and office visit.       Resulted Orders   Surgical Pathology Exam   Result Value Ref Range    Case Report       Surgical Pathology                                Case: [id number]                                  Authorizing Provider:  BARBARA Kelley    Collected:           12/18/2023 0952              Ordering Location:     [facility]       Received:            12/18/2023 9420                                     Center                                                                       Pathologist:           Argentina Thompson MD                                                         Specimen:    BREAST CORE BIOPSY RIGHT, Right breast architechural distortion                            FINAL DIAGNOSIS       A.  Right breast, stereotactic guided core needle biopsy for architectural distortion:  -- Apocrine metaplasia and cysts.              By the signature on this report, the individual or group listed as making the Final Interpretation/Diagnosis certifies that they have reviewed this case.       Clinical History       Right breast stereotactic guided core biopsy (vacuum assisted) for right breast architechural distortion.      Gross Description       Received in formalin, labeled with the patient´s name and hospital number and \" breast core right\", are multiple irregular/cylindrical segments of yellow-white fatty soft tissue aggregating to 4.9 x 1.1 x 0.6 cm.The specimen is submitted in toto in 3 cassettes.  [facility]    NOTE:    Ischemia time: not provided.  This specimen was placed into formalin at: 0952.         10:50 AM    "

## 2023-12-27 DIAGNOSIS — R92.8 ABNORMAL FINDING ON BREAST IMAGING: Primary | ICD-10-CM

## 2023-12-29 ENCOUNTER — LAB (OUTPATIENT)
Dept: LAB | Facility: LAB | Age: 63
End: 2023-12-29
Payer: COMMERCIAL

## 2023-12-29 DIAGNOSIS — I10 HYPERTENSION, UNSPECIFIED TYPE: ICD-10-CM

## 2023-12-29 DIAGNOSIS — E11.9 TYPE 2 DIABETES MELLITUS WITHOUT COMPLICATION, WITHOUT LONG-TERM CURRENT USE OF INSULIN (MULTI): ICD-10-CM

## 2023-12-29 PROCEDURE — 36415 COLL VENOUS BLD VENIPUNCTURE: CPT

## 2023-12-29 PROCEDURE — 83036 HEMOGLOBIN GLYCOSYLATED A1C: CPT

## 2023-12-29 PROCEDURE — 80053 COMPREHEN METABOLIC PANEL: CPT

## 2023-12-30 LAB
ALBUMIN SERPL BCP-MCNC: 3.9 G/DL (ref 3.4–5)
ALP SERPL-CCNC: 127 U/L (ref 33–136)
ALT SERPL W P-5'-P-CCNC: 13 U/L (ref 7–45)
ANION GAP SERPL CALC-SCNC: 10 MMOL/L (ref 10–20)
AST SERPL W P-5'-P-CCNC: 12 U/L (ref 9–39)
BILIRUB SERPL-MCNC: 0.5 MG/DL (ref 0–1.2)
BUN SERPL-MCNC: 9 MG/DL (ref 6–23)
CALCIUM SERPL-MCNC: 10.2 MG/DL (ref 8.6–10.6)
CHLORIDE SERPL-SCNC: 102 MMOL/L (ref 98–107)
CO2 SERPL-SCNC: 31 MMOL/L (ref 21–32)
CREAT SERPL-MCNC: 0.61 MG/DL (ref 0.5–1.05)
EST. AVERAGE GLUCOSE BLD GHB EST-MCNC: 177 MG/DL
GFR SERPL CREATININE-BSD FRML MDRD: >90 ML/MIN/1.73M*2
GLUCOSE SERPL-MCNC: 134 MG/DL (ref 74–99)
HBA1C MFR BLD: 7.8 %
POTASSIUM SERPL-SCNC: 4.2 MMOL/L (ref 3.5–5.3)
PROT SERPL-MCNC: 6.9 G/DL (ref 6.4–8.2)
SODIUM SERPL-SCNC: 139 MMOL/L (ref 136–145)

## 2024-01-08 DIAGNOSIS — R09.89 DECREASED TISSUE PERFUSION: ICD-10-CM

## 2024-01-08 DIAGNOSIS — M79.601 PAIN OF RIGHT UPPER EXTREMITY: ICD-10-CM

## 2024-01-08 DIAGNOSIS — R20.0 HAND NUMBNESS: ICD-10-CM

## 2024-01-08 DIAGNOSIS — E11.9 TYPE 2 DIABETES MELLITUS WITHOUT COMPLICATION, WITHOUT LONG-TERM CURRENT USE OF INSULIN (MULTI): Primary | ICD-10-CM

## 2024-01-08 DIAGNOSIS — E11.9 TYPE 2 DIABETES MELLITUS WITHOUT COMPLICATION, WITHOUT LONG-TERM CURRENT USE OF INSULIN (MULTI): ICD-10-CM

## 2024-01-08 RX ORDER — METFORMIN HYDROCHLORIDE 500 MG/1
500 TABLET ORAL
Qty: 180 TABLET | Refills: 0 | Status: SHIPPED | OUTPATIENT
Start: 2024-01-08 | End: 2024-01-31 | Stop reason: WASHOUT

## 2024-01-08 RX ORDER — METFORMIN HYDROCHLORIDE 500 MG/1
TABLET ORAL
Qty: 45 TABLET | Refills: 0 | Status: SHIPPED | OUTPATIENT
Start: 2024-01-08 | End: 2024-01-08

## 2024-01-09 ENCOUNTER — TELEPHONE (OUTPATIENT)
Dept: PRIMARY CARE | Facility: CLINIC | Age: 64
End: 2024-01-09
Payer: COMMERCIAL

## 2024-01-09 NOTE — TELEPHONE ENCOUNTER
Patient called asking if there is a different medication than Metformin.  Patient's father took metformin and had kidney failure and patient does not want to follow in his foot steps.  Patient also said to let Asha know her finger tips and down are turning white and getting a little worse. Verna [phone].  Patient phone [phone].

## 2024-01-24 ENCOUNTER — HOSPITAL ENCOUNTER (OUTPATIENT)
Dept: VASCULAR MEDICINE | Facility: CLINIC | Age: 64
Discharge: HOME | End: 2024-01-24
Payer: COMMERCIAL

## 2024-01-24 DIAGNOSIS — R09.89 DECREASED TISSUE PERFUSION: ICD-10-CM

## 2024-01-24 DIAGNOSIS — M79.601 PAIN OF RIGHT UPPER EXTREMITY: ICD-10-CM

## 2024-01-24 DIAGNOSIS — R20.0 HAND NUMBNESS: ICD-10-CM

## 2024-01-24 DIAGNOSIS — I70.90 ARTERIAL OBSTRUCTIVE DISEASE: Primary | ICD-10-CM

## 2024-01-24 PROCEDURE — 93923 UPR/LXTR ART STDY 3+ LVLS: CPT

## 2024-01-24 PROCEDURE — 93931 UPPER EXTREMITY STUDY: CPT

## 2024-01-24 PROCEDURE — 93923 UPR/LXTR ART STDY 3+ LVLS: CPT | Performed by: SURGERY

## 2024-01-25 ENCOUNTER — OFFICE VISIT (OUTPATIENT)
Dept: VASCULAR SURGERY | Facility: CLINIC | Age: 64
End: 2024-01-25
Payer: COMMERCIAL

## 2024-01-25 VITALS
HEIGHT: 64 IN | HEART RATE: 60 BPM | DIASTOLIC BLOOD PRESSURE: 78 MMHG | WEIGHT: 182 LBS | SYSTOLIC BLOOD PRESSURE: 110 MMHG | OXYGEN SATURATION: 92 % | BODY MASS INDEX: 31.07 KG/M2

## 2024-01-25 DIAGNOSIS — G54.0 THORACIC OUTLET SYNDROME: ICD-10-CM

## 2024-01-25 PROCEDURE — 3078F DIAST BP <80 MM HG: CPT | Performed by: SURGERY

## 2024-01-25 PROCEDURE — 3074F SYST BP LT 130 MM HG: CPT | Performed by: SURGERY

## 2024-01-25 PROCEDURE — 99204 OFFICE O/P NEW MOD 45 MIN: CPT | Performed by: SURGERY

## 2024-01-25 PROCEDURE — 1036F TOBACCO NON-USER: CPT | Performed by: SURGERY

## 2024-01-25 PROCEDURE — 4010F ACE/ARB THERAPY RXD/TAKEN: CPT | Performed by: SURGERY

## 2024-01-25 NOTE — PROGRESS NOTES
"Kimberley Murillo is a 63 y.o. female     Subjective   This patient presents today as a new consult for evaluation of abnormal upper extremity PVRs.  She states that she does get hand numbness and tingling when she is washing her hair.  She has never smoked.  She is a borderline diabetic.  Past surgical history includes a lumpectomy hip surgery lithotripsy thyroid disease lower leg fracture repair tonsillectomy and a cholecystectomy.  She is currently 63 years old.  She is 5 foot 4 and weighs 182 pounds.  She has multiple allergies that can be found in the chart.  She currently denies any fever chills nausea vomiting or headache         Objective     Vitals:    01/25/24 0900   BP: 110/78   Pulse: 60   SpO2: 92%      Physical Exam  This patient is alert and oriented x 3.  She is in no acute distress.  Head is normocephalic.  Pupils are equal and reactive to light and accommodation.  Neck is soft and supple without palpable lymph nodes.  Heart is regular rate.  Lungs are clear.  Abdomen is soft with positive bowel sounds there is no pain on palpation.  Upper and lower extremities seem to have adequate range of motion.  She has nonpalpable right brachial right radial and right ulnar pulses.  She does have a weak left brachial and radial pulse.  She does have palpable femoral and popliteal pulses that are normal.  Skin turgor is adequate.  Psychologically she seems to be acting appropriately.  Using a Doppler, she has monophasic signals to her right radial and ulnar arteries.  She has a biphasic signal to her left radial and left ulnar arteries.  Blood pressure 110/78, pulse 60, height 1.626 m (5' 4\"), weight 82.6 kg (182 lb), SpO2 92 %.            Patient Active Problem List    Diagnosis Date Noted    Hypertension 11/26/2023    Murmur 11/26/2023    Abnormal finding on breast imaging 11/26/2023    Type 2 diabetes mellitus without complication, without long-term current use of insulin (CMS/[facility]) 11/21/2023          Current " Outpatient Medications:     cholecalciferol (Vitamin D-3) 25 MCG (1000 UT) tablet, Take 1 tablet (25 mcg) by mouth once daily., Disp: , Rfl:     lisinopril 2.5 mg tablet, Take 1 tablet (2.5 mg) by mouth once daily., Disp: , Rfl:     metFORMIN (Glucophage) 500 mg tablet, Take 1 tablet (500 mg) by mouth 2 times a day with meals., Disp: 180 tablet, Rfl: 0    multivitamin with minerals tablet, Take 1 tablet by mouth once daily., Disp: , Rfl:     Unithroid 125 mcg tablet, Take 1 tablet (125 mcg) by mouth once daily., Disp: 90 tablet, Rfl: 3     Lab Results   Component Value Date    WBC 7.9 07/19/2023    HGB 12.9 07/19/2023    HCT 40.7 07/19/2023     07/19/2023    CHOL 211 (H) 05/22/2023    TRIG 107 05/22/2023    HDL 56.1 05/22/2023    ALT 13 12/29/2023    AST 12 12/29/2023     12/29/2023    K 4.2 12/29/2023     12/29/2023    CREATININE 0.61 12/29/2023    BUN 9 12/29/2023    CO2 31 12/29/2023    TSH 1.50 05/22/2023    HGBA1C 7.8 (H) 12/29/2023       Vascular US upper extremity PVR for TOS    Result Date: 1/24/2024                 [address], [address] [address]       Tel [phone] and Fax [phone]  Vascular Lab Report [facility] US UPPER EXTREMITY PVR FOR TOS  Patient Name:      SAMUEL Ellis Physician: [id number] Donavan Engel MD Study Date:        1/24/2024            Ordering           [id number] EVELIA HA                                         Physician: N/PID:           [id number]             Technologist:      Yasmin Barba RVT Accession#:        [id number]         Technologist 2: Date of Birth/Age: 1960 / 63      Encounter#:        [id number]                    years Gender:            F Admission Status:  Outpatient           Location           [facility]                                         Performed:  Diagnosis/ICD: Anesthesia of skin-R20.0; Other specified symptoms  and signs                involving the circulatory and respiratory systems-R09.89 Indication:    Positional right had numbness, pain CPT Codes:     11420 Peripheral artery PVR (multi segmental pressure  **CRITICAL RESULT** Critical Result: dampened upper extremity waveforms with decreased pressure bilaterally Notification called to Taty Travis CNP on 1/24/2024 at 10:45:14 AM by Yasmin Barba RVDAIANA.  CONCLUSIONS: Right Upper PVR: TOS exam may be unreliable due to resting arterial disease. Digit perfusion appears absent with arms at 180 degrees and  position. At rest the right upper extremity demonstrates dampened waveforms with low pressures throughout. Monophasic doppler is noted in the brachial, radial and ulnar artery indicating a more proximal obstruction. Wrist/Brachial index may be inaccurate due to a more proximal obstruction. Left Upper PVR: TOS exam may be unreliable due to resting arterial disease. No significant change in arterial perfusion with manuevers. At rest the left upper extremity demonstrates dampened waveforms with low pressures throughout. Monophasic doppler is noted in the brachial, radial and ulnar artery indicating a more proximal obstruction. Wrist/Brachial index may be inaccurate due to a more proximal obstruction.  Imaging & Doppler Findings:  RIGHT Digit Pressures Index digit           67 mmHg  Radial Ratio          0.90 Ulnar Ratio           0.92 Digit Ratio           0.80  LEFT Digit Pressures Index digit          106 mmHg  Radial Ratio         0.95 Ulnar Ratio          1.12 Digit Ratio          1.26                    Right   Left Brachial Pressure 73 mmHg 84 mmHg      Radial       76 mmHg 80 mmHg       Ulnar       77 mmHg 94 mmHg   [id number] Donavan Engel MD Electronically signed by [id number] Donavan Engel MD on 1/24/2024 at 11:55:51 AM  ** Final **                 Assessment/Plan   Active Problems:  There are no active Hospital Problems.      This patient presents today as a new  consult for evaluation of upper extremity arterial insufficiency.  She has never smoked.  She is a borderline diabetic.  She states that her right hand gets numb and tingling.  She currently denies any fever or chills.  On physical exam, she has a nonpalpable right brachial, ulnar and radial arteries.  She has monophasic signals to these arteries.  She does have biphasic signals to her left ulnar and radial arteries.  I am extremely concerned about her situation and the cause of her arterial insufficiency.  This may be a vasculitis versus aneurysmal issues.  She also has a couple of splinter hemorrhages involving her index finger on the right.  At this time, I would like to obtain a stat CTA of her chest.  I am able to palpate a pulsatile subclavian artery on the right above her clavicle on the medial aspect.  I am not able to palpate any pulses beyond this area.  I would like the patient to call me after her CTA is done so that I can review her CTA and discuss options.  Her situation is quite unusual.  Thank you very much for allow me to take part in the care of your patient.  Sincerely years, Dr. Burke Tesfaye, DO

## 2024-01-26 ENCOUNTER — HOSPITAL ENCOUNTER (OUTPATIENT)
Dept: RADIOLOGY | Facility: HOSPITAL | Age: 64
Discharge: HOME | End: 2024-01-26
Payer: COMMERCIAL

## 2024-01-26 ENCOUNTER — APPOINTMENT (OUTPATIENT)
Dept: RADIOLOGY | Facility: CLINIC | Age: 64
End: 2024-01-26
Payer: COMMERCIAL

## 2024-01-26 DIAGNOSIS — G54.0 THORACIC OUTLET SYNDROME: ICD-10-CM

## 2024-01-26 PROCEDURE — 2550000001 HC RX 255 CONTRASTS: Performed by: SURGERY

## 2024-01-26 PROCEDURE — 71275 CT ANGIOGRAPHY CHEST: CPT | Performed by: RADIOLOGY

## 2024-01-26 PROCEDURE — 71275 CT ANGIOGRAPHY CHEST: CPT

## 2024-01-26 RX ADMIN — IOHEXOL 75 ML: 350 INJECTION, SOLUTION INTRAVENOUS at 10:00

## 2024-01-30 ENCOUNTER — TELEPHONE (OUTPATIENT)
Dept: PRIMARY CARE | Facility: CLINIC | Age: 64
End: 2024-01-30
Payer: COMMERCIAL

## 2024-01-31 ENCOUNTER — OFFICE VISIT (OUTPATIENT)
Dept: RHEUMATOLOGY | Facility: CLINIC | Age: 64
End: 2024-01-31
Payer: COMMERCIAL

## 2024-01-31 ENCOUNTER — LAB (OUTPATIENT)
Dept: LAB | Facility: LAB | Age: 64
End: 2024-01-31
Payer: COMMERCIAL

## 2024-01-31 VITALS
HEIGHT: 64 IN | BODY MASS INDEX: 31 KG/M2 | TEMPERATURE: 97.7 F | SYSTOLIC BLOOD PRESSURE: 102 MMHG | DIASTOLIC BLOOD PRESSURE: 68 MMHG | WEIGHT: 181.6 LBS

## 2024-01-31 DIAGNOSIS — M31.6 LARGE VESSEL VASCULITIS (MULTI): ICD-10-CM

## 2024-01-31 DIAGNOSIS — G54.0 THORACIC OUTLET SYNDROME: ICD-10-CM

## 2024-01-31 DIAGNOSIS — M19.90 ARTHRITIS: ICD-10-CM

## 2024-01-31 DIAGNOSIS — M19.90 ARTHRITIS: Primary | ICD-10-CM

## 2024-01-31 PROBLEM — N32.81 OVERACTIVE BLADDER: Status: ACTIVE | Noted: 2023-07-28

## 2024-01-31 PROBLEM — E55.9 VITAMIN D DEFICIENCY: Status: ACTIVE | Noted: 2024-01-31

## 2024-01-31 PROBLEM — R19.8 ALTERNATING CONSTIPATION AND DIARRHEA: Status: ACTIVE | Noted: 2024-01-31

## 2024-01-31 PROBLEM — M62.89 PELVIC FLOOR DYSFUNCTION IN FEMALE: Status: ACTIVE | Noted: 2024-01-31

## 2024-01-31 PROBLEM — G43.909 MIGRAINE HEADACHE: Status: ACTIVE | Noted: 2024-01-31

## 2024-01-31 PROBLEM — R20.0 NUMBNESS OF HAND: Status: ACTIVE | Noted: 2024-01-31

## 2024-01-31 PROBLEM — Z86.2 HISTORY OF ANEMIA: Status: ACTIVE | Noted: 2024-01-31

## 2024-01-31 PROBLEM — M25.559 ARTHRALGIA OF HIP: Status: ACTIVE | Noted: 2022-01-28

## 2024-01-31 PROBLEM — E78.00 HYPERCHOLESTEREMIA: Status: ACTIVE | Noted: 2024-01-31

## 2024-01-31 PROBLEM — G89.29 CHRONIC HEADACHE DISORDER: Status: ACTIVE | Noted: 2024-01-31

## 2024-01-31 PROBLEM — R53.83 FATIGUE: Status: ACTIVE | Noted: 2024-01-31

## 2024-01-31 PROBLEM — R51.9 CHRONIC HEADACHE DISORDER: Status: ACTIVE | Noted: 2024-01-31

## 2024-01-31 PROBLEM — F51.01 INSOMNIA, IDIOPATHIC: Status: ACTIVE | Noted: 2024-01-31

## 2024-01-31 PROBLEM — E03.9 ACQUIRED HYPOTHYROIDISM: Status: ACTIVE | Noted: 2019-01-25

## 2024-01-31 PROBLEM — R25.2 MUSCLE CRAMPS: Status: ACTIVE | Noted: 2024-01-31

## 2024-01-31 PROBLEM — M79.601 PAIN OF RIGHT UPPER EXTREMITY: Status: ACTIVE | Noted: 2024-01-31

## 2024-01-31 PROBLEM — R73.01 IMPAIRED FASTING GLUCOSE: Status: ACTIVE | Noted: 2024-01-31

## 2024-01-31 PROBLEM — E66.9 OBESITY DUE TO ENERGY IMBALANCE: Status: ACTIVE | Noted: 2024-01-31

## 2024-01-31 LAB
ANION GAP SERPL CALC-SCNC: 14 MMOL/L (ref 10–20)
BASOPHILS # BLD AUTO: 0.08 X10*3/UL (ref 0–0.1)
BASOPHILS NFR BLD AUTO: 0.9 %
BUN SERPL-MCNC: 12 MG/DL (ref 6–23)
CALCIUM SERPL-MCNC: 10.2 MG/DL (ref 8.6–10.3)
CHLORIDE SERPL-SCNC: 103 MMOL/L (ref 98–107)
CO2 SERPL-SCNC: 27 MMOL/L (ref 21–32)
CREAT SERPL-MCNC: 0.73 MG/DL (ref 0.5–1.05)
CRP SERPL-MCNC: 6.11 MG/DL
EGFRCR SERPLBLD CKD-EPI 2021: >90 ML/MIN/1.73M*2
EOSINOPHIL # BLD AUTO: 0.12 X10*3/UL (ref 0–0.7)
EOSINOPHIL NFR BLD AUTO: 1.3 %
ERYTHROCYTE [DISTWIDTH] IN BLOOD BY AUTOMATED COUNT: 13.7 % (ref 11.5–14.5)
ERYTHROCYTE [SEDIMENTATION RATE] IN BLOOD BY WESTERGREN METHOD: 39 MM/H (ref 0–30)
GLUCOSE SERPL-MCNC: 120 MG/DL (ref 74–99)
HCT VFR BLD AUTO: 39.3 % (ref 36–46)
HGB BLD-MCNC: 12.3 G/DL (ref 12–16)
IMM GRANULOCYTES # BLD AUTO: 0.02 X10*3/UL (ref 0–0.7)
IMM GRANULOCYTES NFR BLD AUTO: 0.2 % (ref 0–0.9)
LYMPHOCYTES # BLD AUTO: 2.4 X10*3/UL (ref 1.2–4.8)
LYMPHOCYTES NFR BLD AUTO: 26.4 %
MCH RBC QN AUTO: 27 PG (ref 26–34)
MCHC RBC AUTO-ENTMCNC: 31.3 G/DL (ref 32–36)
MCV RBC AUTO: 86 FL (ref 80–100)
MONOCYTES # BLD AUTO: 0.71 X10*3/UL (ref 0.1–1)
MONOCYTES NFR BLD AUTO: 7.8 %
NEUTROPHILS # BLD AUTO: 5.75 X10*3/UL (ref 1.2–7.7)
NEUTROPHILS NFR BLD AUTO: 63.4 %
NRBC BLD-RTO: 0 /100 WBCS (ref 0–0)
PLATELET # BLD AUTO: 379 X10*3/UL (ref 150–450)
POTASSIUM SERPL-SCNC: 3.9 MMOL/L (ref 3.5–5.3)
RBC # BLD AUTO: 4.56 X10*6/UL (ref 4–5.2)
SODIUM SERPL-SCNC: 140 MMOL/L (ref 136–145)
WBC # BLD AUTO: 9.1 X10*3/UL (ref 4.4–11.3)

## 2024-01-31 PROCEDURE — 82787 IGG 1 2 3 OR 4 EACH: CPT

## 2024-01-31 PROCEDURE — 85652 RBC SED RATE AUTOMATED: CPT

## 2024-01-31 PROCEDURE — 86140 C-REACTIVE PROTEIN: CPT

## 2024-01-31 PROCEDURE — 99215 OFFICE O/P EST HI 40 MIN: CPT | Performed by: INTERNAL MEDICINE

## 2024-01-31 PROCEDURE — 36415 COLL VENOUS BLD VENIPUNCTURE: CPT

## 2024-01-31 PROCEDURE — 86200 CCP ANTIBODY: CPT

## 2024-01-31 PROCEDURE — 86036 ANCA SCREEN EACH ANTIBODY: CPT

## 2024-01-31 PROCEDURE — 86431 RHEUMATOID FACTOR QUANT: CPT

## 2024-01-31 PROCEDURE — 86038 ANTINUCLEAR ANTIBODIES: CPT

## 2024-01-31 PROCEDURE — 80048 BASIC METABOLIC PNL TOTAL CA: CPT

## 2024-01-31 PROCEDURE — 1036F TOBACCO NON-USER: CPT | Performed by: INTERNAL MEDICINE

## 2024-01-31 PROCEDURE — 85025 COMPLETE CBC W/AUTO DIFF WBC: CPT

## 2024-01-31 PROCEDURE — 3074F SYST BP LT 130 MM HG: CPT | Performed by: INTERNAL MEDICINE

## 2024-01-31 PROCEDURE — 83516 IMMUNOASSAY NONANTIBODY: CPT

## 2024-01-31 PROCEDURE — 3078F DIAST BP <80 MM HG: CPT | Performed by: INTERNAL MEDICINE

## 2024-01-31 ASSESSMENT — ENCOUNTER SYMPTOMS
DEPRESSION: 0
LOSS OF SENSATION IN FEET: 0
OCCASIONAL FEELINGS OF UNSTEADINESS: 1

## 2024-01-31 ASSESSMENT — PATIENT HEALTH QUESTIONNAIRE - PHQ9
SUM OF ALL RESPONSES TO PHQ9 QUESTIONS 1 AND 2: 0
1. LITTLE INTEREST OR PLEASURE IN DOING THINGS: NOT AT ALL
2. FEELING DOWN, DEPRESSED OR HOPELESS: NOT AT ALL

## 2024-01-31 NOTE — PROGRESS NOTES
Subjective   Patient ID: Kimberley Murillo is a 63 y.o. female who presents for possible large vessel vasculitis. (Referred from Dr. Tesfaye)    HPI 63-year-old female referred from Dr. Tesfaye for possible large vessel vasculitis.  The patient is present today with her .    Today the patient relates that she had surgery for a left hip labral tear September 2022.  She had tripped and fallen on black pavement.  She states that her hip feels more stable after the surgery.    Her  notes that she seemed to develop more joint pain following the surgery.  She complains of some pain in her shoulders, neck, also fingers and toes..  Her shoulders, neck and hips hurt first thing in the morning.  She is stiff for 30 to 45 minutes in the morning.    The patient also complains of significant fatigue which started summer 2023.  She denies unintentional weight loss, fevers or night sweats.    About 1 month ago, she started having difficulty with her right hand going numb and her fingers turning white when she is doing activities with her hands overhead, such as washing her hair.  She also notes that her arms get fatigued very quickly in this position, and she develops pain in her right proximal arm and right forearm.  She is having difficulty over the last month doing activities such as carrying a pot of spaghetti, as she notes that her right arm started hurting in her arms fatigue easily.  Her  thinks that she has lost muscle mass in her proximal arms.  She notes that her fingers cramp if she is trying to use her right hand.  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    The patient has a history of migraine headaches for several years.  She was to get a migraine every 2 weeks for several years.  Over the last several months, she gets 1-2 migraines per week.  She describes her migraines as starting in her neck, radiate over the entire head.  The headaches are  associated with photophobia.  She gets nausea but no vomiting.  She uses Excedrin Migraine for the headaches.    She has some episodes of what she describes as blurry vision, which she says started June 2023.  She said that this fluctuates, and there are some days her vision is fine.  She states today her vision is fine.    She denies jaw claudication.    Dr. Tesfaye saw the patient January 25, 2024 for evaluation of upper extremity arterial insufficiency.  Physical exam noted by Dr. Tesfaye 1/25/24   included nonpalpable right brachial, right radial, and right ulnar pulses.  She was noted to have a weak left brachial and radial pulse.  She had palpable femoral and popliteal pulses that seemed normal.    Doppler study showed monophasic signal to the right radial and ulnar arteries, with a biphasic signal to the left radial and left ulnar arteries.    CTA of chest 1/27/24 (personally reviewed by me today) shows multifocal disease within the right subclavian and axillary artery with diffuse fusiform narrowing of the distal subclavian artery with proximal occlusion of the axillary artery.  The appearance of the multifocal narrowing and vessel thickening raises the question of a nonatherosclerotic vasculitis/arteritis.  She also has mild to moderate left subclavian and axillary artery disease with smooth fusiform narrowing of the distal subclavian and proximal axillary artery.    The patient did see Dr. Lipscomb in rheumatology November 2022 for evaluation of positive rheumatoid factor of 37.  Dr. Lipscomb's note stated that she had more symptoms in large joints such as shoulders, minimal symptoms in smaller joints (which would be unusual for rheumatoid arthritis).  I believe that she clinically felt she had OA.  She tried several different NSAIDs which were not effective.    Labs 7/22: RF 37, ESR 21  Labs 11/22: JASSI negative, JASSI panel negative, ESR 13, CRP 1.7 (normal less than 1), CCP negative,   Labs 5/23: Hemoglobin  A1c 6.6, cholesterol 211, HDL 56, , triglycerides 107, TSH 1.5, BMP normal except glucose 123  Labs 7/23: CBC normal, UA normal, 25-hydroxy vitamin D 22  Labs 12/23: Hemoglobin A1c 7.8, CMP normal except glucose 134     CXR 12/22: Heart is not enlarged, lungs are clear.  Multilevel degenerative changes noted of the thoracic spine.    Cardiac calcium score August 2020: 0    Medical problem list:  Type 2 diabetes-currently managed with metformin 500 mg twice daily . Hemoglobin A1c was 7.8 December 2023.  Hypothyroidism  Hypertension    Medication: Reviewed as documented  Allergies: Reviewed as documented    Surgeries:   Right breast biopsy  Cholecystectomy   Tonsillectomy  Thyroidectomy- for goiter  Lithotripsy  Left hip labral tear repair    Social history: .  Denies use of tobacco. Denies use of alcohol.  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    Family history:  Father- passed away from CAD, RA  Mother- diabetes, CAD  Paternal grandmother- RA    Review of Systems  Constitutional: Denies fever, chills,  or weight loss.  Complains of significant fatigue.  Eyes: Denies red eyes, dry eyes, painful eyes, or blurred vision.  ENT: Denies sore throat or hoarseness.  Cardiovascular: Denies chest pain, palpitations, or ankle edema.  Respiratory: Denies cough or shortness of breath.  Gastrointestinal: Denies change in bowel habits, denies diarrhea, denies constipation, denies melena or bright red blood per rectum, denies abdominal pain.  Musculoskeletal: Complains of pain in neck and shoulders, along with 30 to 45 minutes of morning stiffness.  Also has some hip pain.  Also has some pain in hands, primarily with trying to use them (also gets arm claudication), right more than left.  Skin: Denies rashes, nodules or other skin lesions.  Neurologic: Gets numbness and tingling in her hands when her arms are held overhead .  Gets migraine headaches 1-2 times per week  ".  psychiatric: Denies confusion or sleep disturbance.  Endocrine: Has  hypothyroidism and diabetes.    Objective   Visit Vitals  /68 (BP Location: Left arm, Patient Position: Sitting, BP Cuff Size: Small adult)   Temp 36.5 °C (97.7 °F)   Ht 1.626 m (5' 4\")   Wt 82.4 kg (181 lb 9.6 oz)   BMI 31.17 kg/m²   OB Status Postmenopausal   Smoking Status Never   BSA 1.93 m²        Physical Exam  HEENT: PERRL, EOMI. Right temporal artery nontender. I could not palpate left temporal artery.  Neck: Supple, no nodes.  CV: RRR, no MGR. No bruits heard.  I could not palpate radial or ulnar pulse in either arm.  I could not auscultate blood pressure in either arm. DP pulses 2+ and equal.  Lungs: Clear, no rales or wheezes.  Abdomen: Soft, nontender. No hepatosplenomegaly.  No pulsatile mass.  Extremities:  No cyanosis, clubbing, or edema.  MS: No synovitis.  MCPs nontender with squeezing.  Skin: No rashes or nodules.  I did not appreciate splinter hemorrhages today.      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  Complains of significant fatigue with 30 to 45 minutes of morning stiffness since summer 2023.  Now complains of 1 month of arm claudication (right arm greater than left arm), also with color change of right hand when hand is held overhead.  Patient has reduced peripheral pulses in upper extremities.    CT angio chest with and without contrast done January 26, 2024 shows multifocal narrowing and vessel thickening in the right subclavian artery, including a long segment circumferential narrowing measuring 1.5 cm (with 75% narrowing).  There is abrupt narrowing and occlusion of the proximal right axillary artery.  There is mild to moderate narrowing of the distal left subclavian artery and mild to moderate narrowing of the proximal axillary artery on the left .  Findings are suspicious for " large vessel vasculitis.     The patient did have a low positive rheumatoid factor in the past, although clinically does not seem to have rheumatoid arthritis.    I believe most likely diagnosis is giant cell arteritis.  I suspect some of her proximal muscle and joint pain may represent PMR.  She did have mildly elevated CRP November 2022.    Differential diagnosis would also include something such as IgG4 related disease.    Plan:  Check ESR, CRP, JASSI, RF, CCP, IgG4 level, ANCA, CBC with diff.  I recommend temporal artery biopsy, to rule out giant cell arteritis.  She may need bilateral temporal artery biopsies to increase yield of the procedure (I was having difficulty palpating a pulse in the left temporal artery).  Further treatment will depend on test results.

## 2024-01-31 NOTE — PATIENT INSTRUCTIONS
Check ESR, CRP, JASSI, RF, CCP, IgG4 level, ANCA, CBC with diff.  I recommend temporal artery biopsy, to rule out giant cell arteritis.  Further treatment will depend on test results.

## 2024-02-01 ENCOUNTER — TELEPHONE (OUTPATIENT)
Dept: PRIMARY CARE | Facility: CLINIC | Age: 64
End: 2024-02-01
Payer: COMMERCIAL

## 2024-02-01 LAB
ANA SER QL HEP2 SUBST: NEGATIVE
CCP IGG SERPL-ACNC: <1 U/ML
IGG4 SER-MCNC: 17 MG/DL (ref 3–200)
RHEUMATOID FACT SER NEPH-ACNC: 18 IU/ML (ref 0–15)

## 2024-02-02 ENCOUNTER — PREP FOR PROCEDURE (OUTPATIENT)
Dept: VASCULAR SURGERY | Facility: HOSPITAL | Age: 64
End: 2024-02-02
Payer: COMMERCIAL

## 2024-02-02 ENCOUNTER — TELEMEDICINE (OUTPATIENT)
Dept: RHEUMATOLOGY | Facility: CLINIC | Age: 64
End: 2024-02-02
Payer: COMMERCIAL

## 2024-02-02 DIAGNOSIS — M31.6 TEMPORAL ARTERITIS (MULTI): Primary | ICD-10-CM

## 2024-02-02 DIAGNOSIS — M31.6 GIANT CELL ARTERITIS (MULTI): Primary | ICD-10-CM

## 2024-02-02 PROCEDURE — 99214 OFFICE O/P EST MOD 30 MIN: CPT | Performed by: INTERNAL MEDICINE

## 2024-02-02 RX ORDER — PREDNISONE 10 MG/1
TABLET ORAL
Qty: 180 TABLET | Refills: 1 | Status: SHIPPED | OUTPATIENT
Start: 2024-02-02 | End: 2024-02-02 | Stop reason: WASHOUT

## 2024-02-02 RX ORDER — PREDNISONE 10 MG/1
TABLET ORAL
Qty: 180 TABLET | Refills: 1 | Status: SHIPPED | OUTPATIENT
Start: 2024-02-02 | End: 2024-04-06 | Stop reason: DRUGHIGH

## 2024-02-02 RX ORDER — SODIUM CHLORIDE 9 MG/ML
70 INJECTION, SOLUTION INTRAVENOUS CONTINUOUS
Status: CANCELLED | OUTPATIENT
Start: 2024-02-05

## 2024-02-02 ASSESSMENT — ENCOUNTER SYMPTOMS
OCCASIONAL FEELINGS OF UNSTEADINESS: 0
DEPRESSION: 0
LOSS OF SENSATION IN FEET: 0

## 2024-02-02 ASSESSMENT — PATIENT HEALTH QUESTIONNAIRE - PHQ9
2. FEELING DOWN, DEPRESSED OR HOPELESS: NOT AT ALL
2. FEELING DOWN, DEPRESSED OR HOPELESS: NOT AT ALL
1. LITTLE INTEREST OR PLEASURE IN DOING THINGS: NOT AT ALL
1. LITTLE INTEREST OR PLEASURE IN DOING THINGS: NOT AT ALL
SUM OF ALL RESPONSES TO PHQ9 QUESTIONS 1 AND 2: 0
SUM OF ALL RESPONSES TO PHQ9 QUESTIONS 1 AND 2: 0

## 2024-02-02 NOTE — PATIENT INSTRUCTIONS
Start prednisone 30 mg twice daily.  I want you to take 60 mg of prednisone today, to make sure you get the entire dose.  Start Citracal 500 mg twice daily.  Start vitamin D 1000 international units daily.  If you experience any sudden visual change, you need to let me know immediately.  Bilateral temporal artery biopsy is scheduled for February 5, 2024.  Follow-up with me in 2 weeks.

## 2024-02-02 NOTE — PROGRESS NOTES
Subjective   Patient ID: Kimberley Murillo is a 63 y.o. female who presents for Follow-up.  Virtual visit is being done.      HPI 63-year-old female referred from Dr. Tesfaye for possible large vessel vasculitis.  Virtual visit is done today.  Patient is at home at the time of the visit\.  Her  is also present.  Verbal consent was given.     Today the patient relates that she had surgery for a left hip labral tear September 2022.  She had tripped and fallen on black pavement.  She states that her hip feels more stable after the surgery.     Her  notes that she seemed to develop more joint pain following the surgery.  She complains of some pain in her shoulders, neck, also fingers and toes..  Her shoulders, neck and hips hurt first thing in the morning.  She is stiff for 30 to 45 minutes in the morning.     The patient also complains of significant fatigue which started summer 2023.  She denies unintentional weight loss, fevers or night sweats.     About 1 month ago, she started having difficulty with her right hand going numb and her fingers turning white when she is doing activities with her hands overhead, such as washing her hair.  She also notes that her arms get fatigued very quickly in this position, and she develops pain in her right proximal arm and right forearm.  She is having difficulty over the last month doing activities such as carrying a pot of spaghetti, as she notes that her right arm started hurting in her arms fatigue easily.  Her  thinks that she has lost muscle mass in her proximal arms.  She notes that her fingers cramp if she is trying to use her right hand.  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     The patient has a history of migraine headaches for several years.  She was to get a migraine every 2 weeks for several years.  Over the last several months, she gets 1-2 migraines per week.  She describes her migraines  as starting in her neck, radiate over the entire head.  The headaches are associated with photophobia.  She gets nausea but no vomiting.  She uses Excedrin Migraine for the headaches.     She has some episodes of what she describes as blurry vision, which she now says started 3 weeks ago.  She said that her vision seems to fluctuate.  She wears trifocals-sometimes if she lifts her glasses to move where the lens is located, then she can see more clearly.  She felt like this today-but she did note that if she lifted her trifocals upward, she could see more clearly.  She notes that her glasses are 2 years old.  The vision is the same in both eyes.  She was able to read today.    She had a migraine this morning which lasted for about 2 hours but went away.     She denies jaw claudication.     Dr. Tesfaye saw the patient January 25, 2024 for evaluation of upper extremity arterial insufficiency.  Physical exam noted by Dr. Tesfaye 1/25/24   included nonpalpable right brachial, right radial, and right ulnar pulses.  She was noted to have a weak left brachial and radial pulse.  She had palpable femoral and popliteal pulses that seemed normal.     Doppler study showed monophasic signal to the right radial and ulnar arteries, with a biphasic signal to the left radial and left ulnar arteries.     CTA of chest 1/27/24 (personally reviewed by me today) shows multifocal disease within the right subclavian and axillary artery with diffuse fusiform narrowing of the distal subclavian artery with proximal occlusion of the axillary artery.  The appearance of the multifocal narrowing and vessel thickening raises the question of a nonatherosclerotic vasculitis/arteritis.  She also has mild to moderate left subclavian and axillary artery disease with smooth fusiform narrowing of the distal subclavian and proximal axillary artery.     The patient did see Dr. Lipscomb in rheumatology November 2022 for evaluation of positive rheumatoid factor  of 37.  Dr. Lipscomb's note stated that she had more symptoms in large joints such as shoulders, minimal symptoms in smaller joints (which would be unusual for rheumatoid arthritis).  I believe that she clinically felt she had OA.  She tried several different NSAIDs which were not effective.     Labs 7/22: RF 37, ESR 21  Labs 11/22: JASSI negative, JASSI panel negative, ESR 13, CRP 1.7 (normal less than 1), CCP negative,   Labs 5/23: Hemoglobin A1c 6.6, cholesterol 211, HDL 56, , triglycerides 107, TSH 1.5, BMP normal except glucose 123  Labs 7/23: CBC normal, UA normal, 25-hydroxy vitamin D 22  Labs 12/23: Hemoglobin A1c 7.8, CMP normal except glucose 134   Labs 1/31/24: ESR 39 (0-30), CRP 6.11 (less than 1), JASSI negative, rheumatoid factor 18 (0-15), CCP negative, IgG4 normal, ANCA pending, CBC with differential normal     CXR 12/22: Heart is not enlarged, lungs are clear.  Multilevel degenerative changes noted of the thoracic spine.     Cardiac calcium score August 2020: 0     Medical problem list:  Type 2 diabetes-currently managed with metformin 500 mg twice daily . Hemoglobin A1c was 7.8 December 2023.  Hypothyroidism  Hypertension     Medication: Reviewed as documented  Allergies: Reviewed as documented     Surgeries:   Right breast biopsy  Cholecystectomy   Tonsillectomy  Thyroidectomy- for goiter  Lithotripsy  Left hip labral tear repair     Social history: .  Denies use of tobacco. Denies use of alcohol.  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     Family history:  Father- passed away from CAD, RA  Mother- diabetes, CAD  Paternal grandmother- RA    ROS:  General: Denies fevers or chills.  CV: Denies chest pain or palpitations.  Denies leg edema.  Lungs: Denies coughing or shortness of breath.  Skin: Denies rashes or nodules.  MS: Has some neck and shoulder pain. Gets right arm pain when lifting arm overhead. C/o arms fatiguing quickly.       Objective   There were no vitals taken for this visit.    Physical Exam  HEENT: External exam of eyes and ears normal.  CV: No apparent edema.  Lungs: Normal respiratory effort.  Neuro: Affect appropriate.    Assessment/Plan     Probable GCA-presents with complaints of joint aching involving shoulders, neck, and  hips (less so hands and feet) since 9/22.  Complains of significant fatigue with 30 to 45 minutes of morning stiffness since summer 2023.  Now complains of 1 month of arm claudication (right arm greater than left arm), also with color change of right hand when hand is held overhead.  Patient has reduced peripheral pulses in upper extremities, has reduced left temporal artery pulse.     CT angio chest with and without contrast done January 26, 2024 shows multifocal narrowing and vessel thickening in the right subclavian artery, including a long segment circumferential narrowing measuring 1.5 cm (with 75% narrowing).  There is abrupt narrowing and occlusion of the proximal right axillary artery.  There is mild to moderate narrowing of the distal left subclavian artery and mild to moderate narrowing of the proximal axillary artery on the left .  Findings are suspicious for large vessel vasculitis.      I believe most likely diagnosis is giant cell arteritis.  I suspect some of her proximal muscle and joint pain may represent PMR.  CRP is elevated at 6.11, ESR is mildly elevated at 39.    Patient complains of episodes of fluctuating vision that she has had for 3 weeks (she initially told me this started June 2023).  She states that if she lifts her trifocals upward, she can sometimes see more clearly.  She also notes that she gets blurred vision with her migraines, and she had a migraine today.    I recommend treatment with prednisone 30 mg twice daily.  I advised her I want her to start prednisone 60 mg as 1 dose today, to make sure she gets the entire dose today.    I discussed potential side effects of  prednisone, including increased risk of infections, risk of hyperglycemia, risk of osteoporosis, risk of cataracts, and risk of weight gain.  I also warned of risk of insomnia.  I also discussed that we may be adding Actemra to the prednisone in a few weeks.    Bilateral temporary biopsy is scheduled for February 5, 2024.  I asked her to follow-up with me in 2 weeks.    Plan:   Start prednisone 30 mg twice daily.  I want you to take 60 mg of prednisone today, to make sure you get the entire dose.  Start Citracal 500 mg twice daily.  Start vitamin D 1000 international units daily.  If you experience any sudden visual change, you need to let me know immediately.  Bilateral temporal artery biopsy is scheduled for February 5, 2024.  Follow-up with me in 2 weeks.

## 2024-02-04 LAB
ANCA AB PATTERN SER IF-IMP: NORMAL
ANCA IGG TITR SER IF: NORMAL {TITER}
MYELOPEROXIDASE AB SER-ACNC: 0 AU/ML (ref 0–19)
PROTEINASE3 AB SER-ACNC: 0 AU/ML (ref 0–19)

## 2024-02-05 ENCOUNTER — ANESTHESIA (OUTPATIENT)
Dept: OPERATING ROOM | Facility: HOSPITAL | Age: 64
End: 2024-02-05
Payer: COMMERCIAL

## 2024-02-05 ENCOUNTER — HOSPITAL ENCOUNTER (OUTPATIENT)
Dept: CARDIOLOGY | Facility: HOSPITAL | Age: 64
Discharge: HOME | End: 2024-02-05
Payer: COMMERCIAL

## 2024-02-05 ENCOUNTER — ANESTHESIA EVENT (OUTPATIENT)
Dept: OPERATING ROOM | Facility: HOSPITAL | Age: 64
End: 2024-02-05
Payer: COMMERCIAL

## 2024-02-05 ENCOUNTER — HOSPITAL ENCOUNTER (OUTPATIENT)
Facility: HOSPITAL | Age: 64
Setting detail: OUTPATIENT SURGERY
Discharge: HOME | End: 2024-02-05
Attending: SURGERY | Admitting: SURGERY
Payer: COMMERCIAL

## 2024-02-05 VITALS
DIASTOLIC BLOOD PRESSURE: 58 MMHG | TEMPERATURE: 96.8 F | RESPIRATION RATE: 17 BRPM | OXYGEN SATURATION: 96 % | SYSTOLIC BLOOD PRESSURE: 124 MMHG | HEART RATE: 100 BPM

## 2024-02-05 DIAGNOSIS — I24.9 ACS (ACUTE CORONARY SYNDROME) (MULTI): ICD-10-CM

## 2024-02-05 DIAGNOSIS — M31.6 TEMPORAL ARTERITIS (MULTI): Primary | ICD-10-CM

## 2024-02-05 LAB — GLUCOSE BLD MANUAL STRIP-MCNC: 163 MG/DL (ref 74–99)

## 2024-02-05 PROCEDURE — 82947 ASSAY GLUCOSE BLOOD QUANT: CPT

## 2024-02-05 PROCEDURE — 2500000005 HC RX 250 GENERAL PHARMACY W/O HCPCS: Performed by: SURGERY

## 2024-02-05 PROCEDURE — 2500000004 HC RX 250 GENERAL PHARMACY W/ HCPCS (ALT 636 FOR OP/ED)

## 2024-02-05 PROCEDURE — 3700000002 HC GENERAL ANESTHESIA TIME - EACH INCREMENTAL 1 MINUTE: Performed by: SURGERY

## 2024-02-05 PROCEDURE — 2720000007 HC OR 272 NO HCPCS: Performed by: SURGERY

## 2024-02-05 PROCEDURE — 7100000001 HC RECOVERY ROOM TIME - INITIAL BASE CHARGE: Performed by: SURGERY

## 2024-02-05 PROCEDURE — 2500000004 HC RX 250 GENERAL PHARMACY W/ HCPCS (ALT 636 FOR OP/ED): Performed by: SURGERY

## 2024-02-05 PROCEDURE — A37609 PR TEMPORAL ARTERY LIGATN OR BX

## 2024-02-05 PROCEDURE — 37609 LIGATION/BX TEMPORAL ARTERY: CPT | Performed by: SURGERY

## 2024-02-05 PROCEDURE — 2500000005 HC RX 250 GENERAL PHARMACY W/O HCPCS

## 2024-02-05 PROCEDURE — 88305 TISSUE EXAM BY PATHOLOGIST: CPT | Performed by: PATHOLOGY

## 2024-02-05 PROCEDURE — 7100000009 HC PHASE TWO TIME - INITIAL BASE CHARGE: Performed by: SURGERY

## 2024-02-05 PROCEDURE — 7100000010 HC PHASE TWO TIME - EACH INCREMENTAL 1 MINUTE: Performed by: SURGERY

## 2024-02-05 PROCEDURE — 93005 ELECTROCARDIOGRAM TRACING: CPT

## 2024-02-05 PROCEDURE — 7100000002 HC RECOVERY ROOM TIME - EACH INCREMENTAL 1 MINUTE: Performed by: SURGERY

## 2024-02-05 PROCEDURE — 2500000004 HC RX 250 GENERAL PHARMACY W/ HCPCS (ALT 636 FOR OP/ED): Performed by: ANESTHESIOLOGY

## 2024-02-05 PROCEDURE — 93010 ELECTROCARDIOGRAM REPORT: CPT | Performed by: INTERNAL MEDICINE

## 2024-02-05 PROCEDURE — 3700000001 HC GENERAL ANESTHESIA TIME - INITIAL BASE CHARGE: Performed by: SURGERY

## 2024-02-05 PROCEDURE — 3600000002 HC OR TIME - INITIAL BASE CHARGE - PROCEDURE LEVEL TWO: Performed by: SURGERY

## 2024-02-05 PROCEDURE — A37609 PR TEMPORAL ARTERY LIGATN OR BX: Performed by: ANESTHESIOLOGY

## 2024-02-05 PROCEDURE — 88305 TISSUE EXAM BY PATHOLOGIST: CPT | Mod: TC,SUR,PARLAB | Performed by: SURGERY

## 2024-02-05 PROCEDURE — 3600000007 HC OR TIME - EACH INCREMENTAL 1 MINUTE - PROCEDURE LEVEL TWO: Performed by: SURGERY

## 2024-02-05 RX ORDER — PROPOFOL 10 MG/ML
INJECTION, EMULSION INTRAVENOUS AS NEEDED
Status: DISCONTINUED | OUTPATIENT
Start: 2024-02-05 | End: 2024-02-05

## 2024-02-05 RX ORDER — NORETHINDRONE AND ETHINYL ESTRADIOL 0.5-0.035
KIT ORAL AS NEEDED
Status: DISCONTINUED | OUTPATIENT
Start: 2024-02-05 | End: 2024-02-05

## 2024-02-05 RX ORDER — MIDAZOLAM HYDROCHLORIDE 1 MG/ML
INJECTION, SOLUTION INTRAMUSCULAR; INTRAVENOUS AS NEEDED
Status: DISCONTINUED | OUTPATIENT
Start: 2024-02-05 | End: 2024-02-05

## 2024-02-05 RX ORDER — ACETAMINOPHEN 325 MG/1
650 TABLET ORAL EVERY 4 HOURS PRN
Status: DISCONTINUED | OUTPATIENT
Start: 2024-02-05 | End: 2024-02-05 | Stop reason: HOSPADM

## 2024-02-05 RX ORDER — MEPERIDINE HYDROCHLORIDE 25 MG/ML
12.5 INJECTION INTRAMUSCULAR; INTRAVENOUS; SUBCUTANEOUS EVERY 10 MIN PRN
Status: DISCONTINUED | OUTPATIENT
Start: 2024-02-05 | End: 2024-02-05 | Stop reason: HOSPADM

## 2024-02-05 RX ORDER — SODIUM CHLORIDE, SODIUM LACTATE, POTASSIUM CHLORIDE, CALCIUM CHLORIDE 600; 310; 30; 20 MG/100ML; MG/100ML; MG/100ML; MG/100ML
100 INJECTION, SOLUTION INTRAVENOUS CONTINUOUS
Status: DISCONTINUED | OUTPATIENT
Start: 2024-02-05 | End: 2024-02-05 | Stop reason: HOSPADM

## 2024-02-05 RX ORDER — ONDANSETRON HYDROCHLORIDE 2 MG/ML
INJECTION, SOLUTION INTRAVENOUS AS NEEDED
Status: DISCONTINUED | OUTPATIENT
Start: 2024-02-05 | End: 2024-02-05

## 2024-02-05 RX ORDER — FENTANYL CITRATE 50 UG/ML
INJECTION, SOLUTION INTRAMUSCULAR; INTRAVENOUS AS NEEDED
Status: DISCONTINUED | OUTPATIENT
Start: 2024-02-05 | End: 2024-02-05

## 2024-02-05 RX ORDER — LIDOCAINE HCL/PF 100 MG/5ML
SYRINGE (ML) INTRAVENOUS AS NEEDED
Status: DISCONTINUED | OUTPATIENT
Start: 2024-02-05 | End: 2024-02-05

## 2024-02-05 RX ORDER — SODIUM CHLORIDE, SODIUM LACTATE, POTASSIUM CHLORIDE, CALCIUM CHLORIDE 600; 310; 30; 20 MG/100ML; MG/100ML; MG/100ML; MG/100ML
INJECTION, SOLUTION INTRAVENOUS CONTINUOUS PRN
Status: DISCONTINUED | OUTPATIENT
Start: 2024-02-05 | End: 2024-02-05

## 2024-02-05 RX ORDER — DEXAMETHASONE SODIUM PHOSPHATE 4 MG/ML
INJECTION, SOLUTION INTRA-ARTICULAR; INTRALESIONAL; INTRAMUSCULAR; INTRAVENOUS; SOFT TISSUE AS NEEDED
Status: DISCONTINUED | OUTPATIENT
Start: 2024-02-05 | End: 2024-02-05

## 2024-02-05 RX ORDER — LIDOCAINE HYDROCHLORIDE 10 MG/ML
INJECTION INFILTRATION; PERINEURAL AS NEEDED
Status: DISCONTINUED | OUTPATIENT
Start: 2024-02-05 | End: 2024-02-05 | Stop reason: HOSPADM

## 2024-02-05 RX ORDER — CEFAZOLIN SODIUM 2 G/100ML
2 INJECTION, SOLUTION INTRAVENOUS ONCE
Status: COMPLETED | OUTPATIENT
Start: 2024-02-05 | End: 2024-02-05

## 2024-02-05 RX ORDER — DEXAMETHASONE SODIUM PHOSPHATE 10 MG/ML
6 INJECTION INTRAMUSCULAR; INTRAVENOUS ONCE
Status: DISCONTINUED | OUTPATIENT
Start: 2024-02-05 | End: 2024-02-05 | Stop reason: HOSPADM

## 2024-02-05 RX ORDER — ONDANSETRON HYDROCHLORIDE 2 MG/ML
4 INJECTION, SOLUTION INTRAVENOUS ONCE AS NEEDED
Status: COMPLETED | OUTPATIENT
Start: 2024-02-05 | End: 2024-02-05

## 2024-02-05 RX ORDER — PHENYLEPHRINE HCL IN 0.9% NACL 1 MG/10 ML
SYRINGE (ML) INTRAVENOUS AS NEEDED
Status: DISCONTINUED | OUTPATIENT
Start: 2024-02-05 | End: 2024-02-05

## 2024-02-05 RX ORDER — ALBUTEROL SULFATE 0.83 MG/ML
2.5 SOLUTION RESPIRATORY (INHALATION) ONCE AS NEEDED
Status: DISCONTINUED | OUTPATIENT
Start: 2024-02-05 | End: 2024-02-05 | Stop reason: HOSPADM

## 2024-02-05 RX ORDER — SODIUM CHLORIDE 9 MG/ML
70 INJECTION, SOLUTION INTRAVENOUS CONTINUOUS
Status: DISCONTINUED | OUTPATIENT
Start: 2024-02-05 | End: 2024-02-05 | Stop reason: HOSPADM

## 2024-02-05 RX ADMIN — HYDROMORPHONE HYDROCHLORIDE 0.5 MG: 1 INJECTION, SOLUTION INTRAMUSCULAR; INTRAVENOUS; SUBCUTANEOUS at 13:30

## 2024-02-05 RX ADMIN — EPHEDRINE SULFATE 10 MG: 50 INJECTION, SOLUTION INTRAVENOUS at 11:05

## 2024-02-05 RX ADMIN — HYDROMORPHONE HYDROCHLORIDE 0.5 MG: 1 INJECTION, SOLUTION INTRAMUSCULAR; INTRAVENOUS; SUBCUTANEOUS at 12:42

## 2024-02-05 RX ADMIN — EPHEDRINE SULFATE 10 MG: 50 INJECTION, SOLUTION INTRAVENOUS at 11:09

## 2024-02-05 RX ADMIN — SODIUM CHLORIDE 70 ML/HR: 9 INJECTION, SOLUTION INTRAVENOUS at 09:47

## 2024-02-05 RX ADMIN — SODIUM CHLORIDE, POTASSIUM CHLORIDE, SODIUM LACTATE AND CALCIUM CHLORIDE 100 ML/HR: 600; 310; 30; 20 INJECTION, SOLUTION INTRAVENOUS at 12:09

## 2024-02-05 RX ADMIN — FENTANYL CITRATE 50 MCG: 50 INJECTION, SOLUTION INTRAMUSCULAR; INTRAVENOUS at 11:44

## 2024-02-05 RX ADMIN — LIDOCAINE HYDROCHLORIDE 50 MG: 20 INJECTION INTRAVENOUS at 10:42

## 2024-02-05 RX ADMIN — PROPOFOL 200 MG: 10 INJECTION, EMULSION INTRAVENOUS at 10:42

## 2024-02-05 RX ADMIN — EPHEDRINE SULFATE 10 MG: 50 INJECTION, SOLUTION INTRAVENOUS at 11:15

## 2024-02-05 RX ADMIN — EPHEDRINE SULFATE 10 MG: 50 INJECTION, SOLUTION INTRAVENOUS at 10:56

## 2024-02-05 RX ADMIN — SODIUM CHLORIDE, SODIUM LACTATE, POTASSIUM CHLORIDE, AND CALCIUM CHLORIDE: 600; 310; 30; 20 INJECTION, SOLUTION INTRAVENOUS at 10:32

## 2024-02-05 RX ADMIN — EPHEDRINE SULFATE 10 MG: 50 INJECTION, SOLUTION INTRAVENOUS at 10:43

## 2024-02-05 RX ADMIN — Medication 100 MCG: at 11:27

## 2024-02-05 RX ADMIN — CEFAZOLIN SODIUM 2 G: 2 INJECTION, SOLUTION INTRAVENOUS at 10:36

## 2024-02-05 RX ADMIN — MIDAZOLAM 2 MG: 1 INJECTION INTRAMUSCULAR; INTRAVENOUS at 10:35

## 2024-02-05 RX ADMIN — FENTANYL CITRATE 50 MCG: 50 INJECTION, SOLUTION INTRAMUSCULAR; INTRAVENOUS at 11:18

## 2024-02-05 RX ADMIN — ONDANSETRON 4 MG: 2 INJECTION INTRAMUSCULAR; INTRAVENOUS at 12:46

## 2024-02-05 RX ADMIN — DEXAMETHASONE SODIUM PHOSPHATE 4 MG: 4 INJECTION, SOLUTION INTRAMUSCULAR; INTRAVENOUS at 10:49

## 2024-02-05 RX ADMIN — FENTANYL CITRATE 50 MCG: 50 INJECTION, SOLUTION INTRAMUSCULAR; INTRAVENOUS at 11:39

## 2024-02-05 RX ADMIN — Medication 100 MCG: at 10:43

## 2024-02-05 RX ADMIN — ONDANSETRON 4 MG: 2 INJECTION INTRAMUSCULAR; INTRAVENOUS at 11:51

## 2024-02-05 RX ADMIN — HYDROMORPHONE HYDROCHLORIDE 0.5 MG: 1 INJECTION, SOLUTION INTRAMUSCULAR; INTRAVENOUS; SUBCUTANEOUS at 12:23

## 2024-02-05 RX ADMIN — FENTANYL CITRATE 50 MCG: 50 INJECTION, SOLUTION INTRAMUSCULAR; INTRAVENOUS at 10:42

## 2024-02-05 SDOH — HEALTH STABILITY: MENTAL HEALTH: CURRENT SMOKER: 0

## 2024-02-05 ASSESSMENT — PAIN SCALES - GENERAL
PAINLEVEL_OUTOF10: 10 - WORST POSSIBLE PAIN
PAIN_LEVEL: 0
PAINLEVEL_OUTOF10: 10 - WORST POSSIBLE PAIN
PAINLEVEL_OUTOF10: 6
PAINLEVEL_OUTOF10: 8

## 2024-02-05 ASSESSMENT — PAIN DESCRIPTION - LOCATION
LOCATION: HEAD

## 2024-02-05 ASSESSMENT — PAIN - FUNCTIONAL ASSESSMENT
PAIN_FUNCTIONAL_ASSESSMENT: 0-10

## 2024-02-05 ASSESSMENT — COLUMBIA-SUICIDE SEVERITY RATING SCALE - C-SSRS
6. HAVE YOU EVER DONE ANYTHING, STARTED TO DO ANYTHING, OR PREPARED TO DO ANYTHING TO END YOUR LIFE?: NO
1. IN THE PAST MONTH, HAVE YOU WISHED YOU WERE DEAD OR WISHED YOU COULD GO TO SLEEP AND NOT WAKE UP?: NO
2. HAVE YOU ACTUALLY HAD ANY THOUGHTS OF KILLING YOURSELF?: NO

## 2024-02-05 NOTE — ANESTHESIA PROCEDURE NOTES
Airway  Date/Time: 2/5/2024 10:43 AM  Urgency: elective    Airway not difficult    Staffing  Performed: CRNA   Authorized by: Pipo Schafer MD    Performed by: JAN Lomas-BHARAT  Patient location during procedure: OR    Indications and Patient Condition  Indications for airway management: anesthesia  Spontaneous Ventilation: absent  Sedation level: deep  Preoxygenated: yes  Patient position: sniffing  MILS maintained throughout  Mask difficulty assessment: 1 - vent by mask  Planned trial extubation    Final Airway Details  Final airway type: supraglottic airway      Successful airway: classic  Size 4     Number of attempts at approach: 1  Number of other approaches attempted: 0

## 2024-02-05 NOTE — PERIOPERATIVE NURSING NOTE
Pt states her nausea is subsiding, but she does not want anything to drink yet. Pt does not want her head raised at present, she feels her head with start  hurting more

## 2024-02-05 NOTE — PERIOPERATIVE NURSING NOTE
Pt states she feels better, ready to get up and get dressed. Home going instructions reviewed and discussed, pt and family verbalize understanding. Educated on anesthesia safety.  Tolerated fluids well

## 2024-02-05 NOTE — OP NOTE
BILATERAL TEMPORAL ARTERY BIOPSY (B) Operative Note     Date: 2024  OR Location: [facility]    Name: Kimberley Murillo, : 1960, Age: 63 y.o., MRN: [id number], Sex: female    Diagnosis  Pre-op Diagnosis     * Temporal arteritis (CMS/HCC) [M31.6] Post-op Diagnosis     * Temporal arteritis (CMS/HCC) [M31.6]     Procedures  BILATERAL TEMPORAL ARTERY BIOPSY  00711 - NV LIGATION/BIOPSY TEMPORAL ARTERY      Surgeons      * Burke LUIS MANUEL Mera - Primary    Resident/Fellow/Other Assistant:  Surgeon(s) and Role:    Procedure Summary  Anesthesia: Monitor Anesthesia Care  ASA: III  Anesthesia Staff: Anesthesiologist: Pipo Schafer MD  CRNA: JAN Lomas-CRNA  Estimated Blood Loss: minimal  Intra-op Medications:   Administrations occurring from 1100 to 1230 on 24:   Medication Name Total Dose   lidocaine (Xylocaine) 10 mg/mL (1 %) injection 4 mL              Anesthesia Record               Intraprocedure I/O Totals          Intake    LR infusion 1000.00 mL    ceFAZolin in dextrose (iso-os) (Ancef) IVPB 2 g 100.00 mL    Total Intake 1100 mL          Specimen:   ID Type Source Tests Collected by Time   1 : LEFT TEMPORAL ARTERY BIOPSY Tissue TEMPORAL ARTERY BIOPSY SURGICAL PATHOLOGY EXAM Burke Tesfaye, DO 2024 1116   2 : RIGHT TEMPORAL ARTERY BIOPSY Tissue TEMPORAL ARTERY BIOPSY SURGICAL PATHOLOGY EXAM Burke Tesfaye, DO 2024 1149        Staff:   Circulator: Isis Hartmann RN  Scrub Person: Erin Dash RN         Drains and/or Catheters: * None in log *    Tourniquet Times:         Implants:     Findings:     Indications: Kimberley Murillo is an 63 y.o. female who is having surgery for Temporal arteritis (CMS/HCC) [M31.6].     The patient was seen in the preoperative area. The risks, benefits, complications, treatment options, non-operative alternatives, expected recovery and outcomes were discussed with the patient. The possibilities of reaction to medication, pulmonary aspiration, injury to  surrounding structures, bleeding, recurrent infection, the need for additional procedures, failure to diagnose a condition, and creating a complication requiring transfusion or operation were discussed with the patient. The patient concurred with the proposed plan, giving informed consent.  The site of surgery was properly noted/marked if necessary per policy. The patient has been actively warmed in preoperative area. Preoperative antibiotics have been ordered and given within 1 hours of incision. Venous thrombosis prophylaxis have been ordered including bilateral sequential compression devices    Procedure Details: After the procedure was fully explained to the patient after adequate consent form was signed, the patient had an IV and received preoperative IV antibiotics.  The patient was then taken operating room placed in the supine position and given general acetic agent had LMA for ventilation.  The left temporal region was cleaned and prepped in usual sterile fashion.  Using lidocaine, the skin subcutaneous tissue overlying the left superficial temporal artery was anesthetized.  A longitudinal incision was made.  Dissection was carried down through subcutaneous tissue and through the fascia.  The artery was identified circumferentially dissected free.  The incision was then extended more cephalad.  The artery continued to be circumferentially dissected free.  There was a branch identified that was circumferentially dissected free and ligated with 4-0 Vicryl.  After adequate length of the artery was obtained the artery was then ligated as far proximally as possible with 4-0 Vicryl and as far distally as possible with 4-0 Vicryl.  The artery was then transected using a scalpel.  Just over 2 cm of artery was obtained.  Hemostasis was achieved.  The wound was copiously irrigated with antibiotic solution and then the subcutaneous tissue layer was closed using inverted interrupted sutures of 4-0 Vicryl and the skin  was then closed using a running subcuticular stitch of 4-0 Vicryl.  Dermabond was applied.  Attention was then given to the right temporal region.  The right superficial temporal region was cleaned prepped usual sterile fashion.  Using lidocaine, the skin subcutaneous tissue overlying the artery was anesthetized.  A longitudinal incision was made.  Dissection was carried down through subcutaneous tissue and through the fascia.  The artery was identified circumferentially dissected free throughout the length of the incision.  The incision was extended in a cephalad direction.  A small branch was identified and this was circumferentially dissected free and ligated with 4-0 Vicryl.  After adequate length was obtained the artery was then ligated as far distally and proximally as possible.  The artery was then transected using a #11 blade and sent to pathology for both gross and microscopic evaluation.  Hemostasis was achieved.  The wound was copiously irrigated with antibiotic solution.  The subcutaneous tissue layer was closed using inverted interrupted sutures of 4-0 Vicryl and the skin was then closed using a running subcu subcuticular stitch of 4-0 Vicryl.  Dermabond was applied.  The patient tolerated the procedures well.  No complications noted.  Instrument count needle count sponge counts correct.  Thank you very much this ends dictation  Complications:  None; patient tolerated the procedure well.    Disposition: PACU - hemodynamically stable.  Condition: stable         Additional Details:     Attending Attestation: I was present and scrubbed for the entire procedure.    Burke Tesfaye  Phone Number: [phone]

## 2024-02-05 NOTE — PERIOPERATIVE NURSING NOTE
Sitting up on cart. Hot pack to posterior neck. Pt using Ice pack on cheeks and forehead. Pt states her stomach feels better.  at bedside

## 2024-02-05 NOTE — ANESTHESIA POSTPROCEDURE EVALUATION
Patient: Kimberley Murillo    Procedure Summary       Date: 02/05/24 Room / Location: [facility] 02 / [facility]    Anesthesia Start: 1032 Anesthesia Stop: 1211    Procedure: BILATERAL TEMPORAL ARTERY BIOPSY (Bilateral) Diagnosis:       Temporal arteritis (CMS/HCC)      (Temporal arteritis (CMS/HCC) [M31.6])    Surgeons: Burke Tesfaye DO Responsible Provider: Pipo Schafer MD    Anesthesia Type: general ASA Status: 3            Anesthesia Type: general    Vitals Value Taken Time   /59 02/05/24 1211   Temp 36.0 02/05/24 1211   Pulse 103 02/05/24 1210   Resp 14 02/05/24 1211   SpO2 100 % 02/05/24 1210   Vitals shown include unvalidated device data.    Anesthesia Post Evaluation    Patient location during evaluation: PACU  Patient participation: complete - patient participated  Level of consciousness: awake and alert  Pain score: 0  Pain management: satisfactory to patient  Airway patency: patent  Cardiovascular status: acceptable and hemodynamically stable  Respiratory status: acceptable and face mask  Hydration status: acceptable  Postoperative Nausea and Vomiting: none      There were no known notable events for this encounter.

## 2024-02-05 NOTE — ANESTHESIA PREPROCEDURE EVALUATION
Patient: Kimberley Murillo    Procedure Information       Date/Time: 02/05/24 1100    Procedure: BILATERAL TEMPORAL ARTERY BIOPSY (Bilateral) - Biopsy Temporal Artery    Location: [facility] / [facility]    Surgeons: Burke Tesfaye, DO            Relevant Problems   Cardiovascular   (+) Hypercholesteremia   (+) Hypertension   (+) Murmur      Endocrine   (+) Acquired hypothyroidism   (+) Obesity due to energy imbalance   (+) Type 2 diabetes mellitus without complication, without long-term current use of insulin (CMS/[facility])      Neuro/Psych   (+) Chronic headache disorder      Other   (+) Arthritis       Clinical information reviewed:   Tobacco  Allergies  Meds  Problems  Med Hx  Surg Hx   Fam Hx  Soc   Hx        NPO Detail:  NPO/Void Status  Date of Last Liquid: 02/05/24  Time of Last Liquid: 0300  Date of Last Solid: 02/04/24  Time of Last Solid: 1900         Physical Exam    Airway  Mallampati: I  TM distance: >3 FB  Neck ROM: full     Cardiovascular - normal exam     Dental - normal exam     Pulmonary - normal exam     Abdominal - normal exam             Anesthesia Plan    History of general anesthesia?: yes  History of complications of general anesthesia?: no    ASA 3     general   (Denies GERD)  The patient is not a current smoker.    intravenous induction   Postoperative administration of opioids is intended.  Anesthetic plan and risks discussed with patient.    Plan discussed with CRNA.

## 2024-02-06 ASSESSMENT — PAIN SCALES - GENERAL: PAINLEVEL_OUTOF10: 0 - NO PAIN

## 2024-02-08 ENCOUNTER — APPOINTMENT (OUTPATIENT)
Dept: RADIOLOGY | Facility: CLINIC | Age: 64
End: 2024-02-08
Payer: COMMERCIAL

## 2024-02-09 LAB
ATRIAL RATE: 72 BPM
P AXIS: 22 DEGREES
P OFFSET: 182 MS
P ONSET: 135 MS
PR INTERVAL: 148 MS
Q ONSET: 209 MS
QRS COUNT: 11 BEATS
QRS DURATION: 100 MS
QT INTERVAL: 400 MS
QTC CALCULATION(BAZETT): 438 MS
QTC FREDERICIA: 425 MS
R AXIS: 6 DEGREES
T AXIS: 19 DEGREES
T OFFSET: 409 MS
VENTRICULAR RATE: 72 BPM

## 2024-02-12 ENCOUNTER — TELEPHONE (OUTPATIENT)
Dept: PRIMARY CARE | Facility: CLINIC | Age: 64
End: 2024-02-12
Payer: COMMERCIAL

## 2024-02-12 DIAGNOSIS — E11.9 TYPE 2 DIABETES MELLITUS WITHOUT COMPLICATION, WITHOUT LONG-TERM CURRENT USE OF INSULIN (MULTI): Primary | ICD-10-CM

## 2024-02-12 RX ORDER — METFORMIN HYDROCHLORIDE 500 MG/1
500 TABLET, EXTENDED RELEASE ORAL
Qty: 30 TABLET | Refills: 11 | Status: SHIPPED | OUTPATIENT
Start: 2024-02-12 | End: 2024-04-16

## 2024-02-12 NOTE — TELEPHONE ENCOUNTER
Patient can't get her sugar down. Patient was put on prednisone. Patient was in the 300's yesterday, and is unable to bring it down. Patient has been eating better, and isn't sure what else she can do.  Please advise of next steps.

## 2024-02-12 NOTE — TELEPHONE ENCOUNTER
I talked with patient.  She is currently on no medications for diabetes.  Hemoglobin A1c was 7.8 December 2023 .  I recommend add metformin  mg with dinner.  I advised her to stay well-hydrated with noncaffeinated beverages.  She is avoiding carbs.  She will let me know if her blood sugar continues to run high in case something else needs to be added.  Dr. Elder

## 2024-02-13 ENCOUNTER — TELEPHONE (OUTPATIENT)
Dept: PRIMARY CARE | Facility: CLINIC | Age: 64
End: 2024-02-13
Payer: COMMERCIAL

## 2024-02-13 DIAGNOSIS — M31.6 LARGE VESSEL VASCULITIS (MULTI): ICD-10-CM

## 2024-02-13 DIAGNOSIS — R73.9 HYPERGLYCEMIA: Primary | ICD-10-CM

## 2024-02-13 NOTE — TELEPHONE ENCOUNTER
I spoke with patient. She started metformin  mg daily with dinner last evening.  Some blood sugars are still in 300s and 400s.  I placed referral to [facility] pharmacy, so the pharmacist can assist with diabetes management as well.  Dr. Elder

## 2024-02-15 ENCOUNTER — OFFICE VISIT (OUTPATIENT)
Dept: VASCULAR SURGERY | Facility: CLINIC | Age: 64
End: 2024-02-15
Payer: COMMERCIAL

## 2024-02-15 VITALS
BODY MASS INDEX: 29.37 KG/M2 | DIASTOLIC BLOOD PRESSURE: 60 MMHG | OXYGEN SATURATION: 92 % | SYSTOLIC BLOOD PRESSURE: 120 MMHG | WEIGHT: 172 LBS | HEART RATE: 68 BPM | HEIGHT: 64 IN

## 2024-02-15 DIAGNOSIS — M31.6 GIANT CELL ARTERITIS (MULTI): Primary | ICD-10-CM

## 2024-02-15 PROCEDURE — 3078F DIAST BP <80 MM HG: CPT | Performed by: SURGERY

## 2024-02-15 PROCEDURE — 1036F TOBACCO NON-USER: CPT | Performed by: SURGERY

## 2024-02-15 PROCEDURE — 3074F SYST BP LT 130 MM HG: CPT | Performed by: SURGERY

## 2024-02-15 PROCEDURE — 99024 POSTOP FOLLOW-UP VISIT: CPT | Performed by: SURGERY

## 2024-02-18 NOTE — PROGRESS NOTES
"Kimberley Murillo is a 63 y.o. female     Subjective   This patient presents today for follow-up of her bilateral temporal artery biopsy.  She denies any issues associated with her incisions.  She denies any fever chills nausea vomiting or headache.  She does complain that her blood sugars are elevated since starting steroids.           Objective     Vitals:    02/15/24 1100   BP: 120/60   Pulse: 68   SpO2: 92%      Physical Exam  This patient is alert and oriented x3 her head is normocephalic neck is soft and supple without palpable lymph nodes.  Heart is regular rate.  Lungs are clear.  Abdomen soft with positive bowel sounds.  There is no pain on palpation.  Upper and lower extremities have adequate range of motion with palpable brachial left radial bilateral femoral and popliteal pulses.  Skin turgor is adequate.  Psychologically the patient appears to be acting appropriately.  Her bilateral temporal incisions are healing well.  Her right radial pulse is nonpalpable but her Doppler signals are significantly improved from prior to starting steroids.  Blood pressure 120/60, pulse 68, height 1.626 m (5' 4\"), weight 78 kg (172 lb), SpO2 92 %.            Patient Active Problem List    Diagnosis Date Noted    Temporal arteritis (CMS/[facility]) 02/02/2024    Alternating constipation and diarrhea 01/31/2024    Arthritis 01/31/2024    Chronic headache disorder 01/31/2024    Fatigue 01/31/2024    History of anemia 01/31/2024    Hypercholesteremia 01/31/2024    Impaired fasting glucose 01/31/2024    Insomnia, idiopathic 01/31/2024    Migraine headache 01/31/2024    Muscle cramps 01/31/2024    Numbness of hand 01/31/2024    Obesity due to energy imbalance 01/31/2024    Pain of right upper extremity 01/31/2024    Pelvic floor dysfunction in female 01/31/2024    Vitamin D deficiency 01/31/2024    Thoracic outlet syndrome 01/25/2024    Hypertension 11/26/2023    Murmur 11/26/2023    Abnormal finding on breast imaging 11/26/2023    Type " 2 diabetes mellitus without complication, without long-term current use of insulin (CMS/[facility]) 11/21/2023    Overactive bladder 07/28/2023    Arthralgia of hip 01/28/2022    Acquired hypothyroidism 01/25/2019    Diffuse cystic mastopathy 11/01/2006          Current Outpatient Medications:     cholecalciferol (Vitamin D-3) 25 MCG (1000 UT) tablet, Take 1 tablet (25 mcg) by mouth once daily., Disp: , Rfl:     metFORMIN XR (Glucophage-XR) 500 mg 24 hr tablet, Take 1 tablet (500 mg) by mouth once daily in the evening. Take with meals. Do not crush, chew, or split., Disp: 30 tablet, Rfl: 11    multivitamin with minerals tablet, Take 1 tablet by mouth once daily., Disp: , Rfl:     predniSONE (Deltasone) 10 mg tablet, Take 3 tabs twice daily., Disp: 180 tablet, Rfl: 1    Unithroid 125 mcg tablet, Take 1 tablet (125 mcg) by mouth once daily., Disp: 90 tablet, Rfl: 3     Lab Results   Component Value Date    WBC 9.1 01/31/2024    HGB 12.3 01/31/2024    HCT 39.3 01/31/2024     01/31/2024    CHOL 211 (H) 05/22/2023    TRIG 107 05/22/2023    HDL 56.1 05/22/2023    ALT 13 12/29/2023    AST 12 12/29/2023     01/31/2024    K 3.9 01/31/2024     01/31/2024    CREATININE 0.73 01/31/2024    BUN 12 01/31/2024    CO2 27 01/31/2024    TSH 1.50 05/22/2023    HGBA1C 7.8 (H) 12/29/2023       No results found.              Assessment/Plan   Active Problems:  There are no active Hospital Problems.      This patient presents today for follow-up of her bilateral temporal artery biopsy.  Her incisions are healing well.  The pathology report states that there is no arteritis identified.  Her symptoms involving her hands have definitely improved since starting steroids.  Her Doppler signal to her right radial and ulnar pulses are definitely improved also.  She now has a palpable left radial pulse.  This patient needs to continue to follow-up with her rheumatologist who is handling her medical care for her suspected giant cell  arteritis.  She can follow-up with me as needed.      Burke Tesfaye, DO

## 2024-02-22 ENCOUNTER — LAB (OUTPATIENT)
Dept: LAB | Facility: LAB | Age: 64
End: 2024-02-22
Payer: COMMERCIAL

## 2024-02-22 ENCOUNTER — OFFICE VISIT (OUTPATIENT)
Dept: RHEUMATOLOGY | Facility: CLINIC | Age: 64
End: 2024-02-22
Payer: COMMERCIAL

## 2024-02-22 VITALS
TEMPERATURE: 97.7 F | HEIGHT: 64 IN | DIASTOLIC BLOOD PRESSURE: 58 MMHG | WEIGHT: 171.2 LBS | OXYGEN SATURATION: 98 % | SYSTOLIC BLOOD PRESSURE: 107 MMHG | HEART RATE: 99 BPM | BODY MASS INDEX: 29.23 KG/M2

## 2024-02-22 DIAGNOSIS — Z79.899 HIGH RISK MEDICATION USE: ICD-10-CM

## 2024-02-22 DIAGNOSIS — Z11.59 NEED FOR HEPATITIS B SCREENING TEST: ICD-10-CM

## 2024-02-22 DIAGNOSIS — E11.9 TYPE 2 DIABETES MELLITUS WITHOUT COMPLICATION, WITHOUT LONG-TERM CURRENT USE OF INSULIN (MULTI): ICD-10-CM

## 2024-02-22 DIAGNOSIS — Z11.59 NEED FOR HEPATITIS C SCREENING TEST: ICD-10-CM

## 2024-02-22 DIAGNOSIS — M31.6 GIANT CELL ARTERITIS (MULTI): ICD-10-CM

## 2024-02-22 DIAGNOSIS — M31.6 GIANT CELL ARTERITIS (MULTI): Primary | ICD-10-CM

## 2024-02-22 LAB
CRP SERPL-MCNC: 1.27 MG/DL
ERYTHROCYTE [SEDIMENTATION RATE] IN BLOOD BY WESTERGREN METHOD: 7 MM/H (ref 0–30)

## 2024-02-22 PROCEDURE — 86803 HEPATITIS C AB TEST: CPT

## 2024-02-22 PROCEDURE — 99215 OFFICE O/P EST HI 40 MIN: CPT | Performed by: INTERNAL MEDICINE

## 2024-02-22 PROCEDURE — 3074F SYST BP LT 130 MM HG: CPT | Performed by: INTERNAL MEDICINE

## 2024-02-22 PROCEDURE — 36415 COLL VENOUS BLD VENIPUNCTURE: CPT

## 2024-02-22 PROCEDURE — 3078F DIAST BP <80 MM HG: CPT | Performed by: INTERNAL MEDICINE

## 2024-02-22 PROCEDURE — 86140 C-REACTIVE PROTEIN: CPT

## 2024-02-22 PROCEDURE — 86706 HEP B SURFACE ANTIBODY: CPT

## 2024-02-22 PROCEDURE — 86481 TB AG RESPONSE T-CELL SUSP: CPT

## 2024-02-22 PROCEDURE — 87340 HEPATITIS B SURFACE AG IA: CPT

## 2024-02-22 PROCEDURE — 1036F TOBACCO NON-USER: CPT | Performed by: INTERNAL MEDICINE

## 2024-02-22 PROCEDURE — 86704 HEP B CORE ANTIBODY TOTAL: CPT

## 2024-02-22 PROCEDURE — 85652 RBC SED RATE AUTOMATED: CPT

## 2024-02-22 RX ORDER — TOCILIZUMAB 180 MG/ML
162 INJECTION, SOLUTION SUBCUTANEOUS
Qty: 12 EACH | Refills: 3 | Status: SHIPPED | OUTPATIENT
Start: 2024-02-22 | End: 2024-03-13 | Stop reason: CLARIF

## 2024-02-22 RX ORDER — GLIMEPIRIDE 1 MG/1
1 TABLET ORAL
Qty: 30 TABLET | Refills: 11 | Status: SHIPPED | OUTPATIENT
Start: 2024-02-22 | End: 2024-03-01 | Stop reason: SINTOL

## 2024-02-22 ASSESSMENT — PATIENT HEALTH QUESTIONNAIRE - PHQ9
SUM OF ALL RESPONSES TO PHQ9 QUESTIONS 1 AND 2: 0
2. FEELING DOWN, DEPRESSED OR HOPELESS: NOT AT ALL
1. LITTLE INTEREST OR PLEASURE IN DOING THINGS: NOT AT ALL

## 2024-02-22 NOTE — PROGRESS NOTES
Subjective   Patient ID: Kimberley Murillo is a 63 y.o. female who presents for Follow-up regarding large vessel vasculitis.    HPI 63-year-old female here for follow-up regarding large vessel vasculitis.  Her  is also present.     The patient relates that she had surgery for a left hip labral tear September 2022.  She had tripped and fallen on black pavement.  She states that her hip feels more stable after the surgery.     Her  notes that she seemed to develop more joint pain following the surgery.  She complains of some pain in her shoulders, neck, also fingers and toes..  Her shoulders, neck and hips hurt first thing in the morning.  She is stiff for 30 to 45 minutes in the morning.     The patient also complains of significant fatigue which started summer 2023.  She denies unintentional weight loss, fevers or night sweats.     In December 2023,  she started having difficulty with her right hand going numb and her fingers turning white when she is doing activities with her hands overhead, such as washing her hair.  She also notes that her arms got fatigued very quickly in this position, and she developed pain in her right proximal arm and right forearm.  She was having difficulty since December 2023 doing activities such as carrying a pot of spaghetti, as she notes that her right arm started hurting in her arms fatigue easily.  Her  thinks that she has lost muscle mass in her proximal arms.  She notes that her fingers cramp if she is trying to use her right hand.  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     The patient has a history of migraine headaches for several years.  She was to get a migraine every 2 weeks for several years.  Over the last several months, she gets 1-2 migraines per week.  She describes her migraines as starting in her neck, radiate over the entire head.  The headaches are associated with photophobia.  She gets  nausea but no vomiting.  She uses Excedrin Migraine for the headaches.     Dr. Tesfaye saw the patient January 25, 2024 for evaluation of upper extremity arterial insufficiency.  Physical exam noted by Dr. Tesfaye 1/25/24   included nonpalpable right brachial, right radial, and right ulnar pulses.  She was noted to have a weak left brachial and radial pulse.  She had palpable femoral and popliteal pulses that seemed normal.     CTA of chest 1/27/24  shows multifocal disease within the right subclavian and axillary artery with diffuse fusiform narrowing of the distal subclavian artery with proximal occlusion of the axillary artery.  The appearance of the multifocal narrowing and vessel thickening raises the question of a nonatherosclerotic vasculitis/arteritis.  She also has mild to moderate left subclavian and axillary artery disease with smooth fusiform narrowing of the distal subclavian and proximal axillary artery.    She had bilateral temporal artery biopsies done February 5, 2024 with concerns for giant cell arteritis.  The biopsies did not show evidence of vasculitis.    Prednisone 30 mg twice daily was started 2/02/24.  Unfortunately her blood sugar has been quite elevated.  She is known to be diabetic but was not previously on any medication.  Hemoglobin A1c was 7.8 December 2023-she was trying to manage her blood sugar with diet alone and had been doing better prior to starting prednisone.  Her blood sugar in the morning of February 20, 2024 was 503.  Blood sugars later in the day were 377 and 282.  Blood sugars on February 21, 2024 were 280, 225 and 323.    She did start metformin  mg with dinner 2/12/24.    Her right arm overall feels somewhat better with some improvement of the arm claudication.  However, over the last few days she has noticed that she is getting some cramping in her hand and forearm when she is using her right hand (such as trying to cook).    She had 1 headache 2 weeks which was not  "an unusual headache for her.  She denies jaw claudication or blurred vision.    Labs 7/22: RF 37, ESR 21  Labs 11/22: JASSI negative, JASSI panel negative, ESR 13, CRP 1.7 (normal less than 1), CCP negative,   Labs 5/23: Hemoglobin A1c 6.6, cholesterol 211, HDL 56, , triglycerides 107, TSH 1.5, BMP normal except glucose 123  Labs 7/23: CBC normal, UA normal, 25-hydroxy vitamin D 22  Labs 12/23: Hemoglobin A1c 7.8, CMP normal except glucose 134   Labs 1/31/24: ESR 39 (0-30), CRP 6.11 (less than 1), JASSI negative, rheumatoid factor 18 (0-15), CCP negative, IgG4 normal, ANCA negative, CBC with differential normal     CXR 12/22: Heart is not enlarged, lungs are clear.  Multilevel degenerative changes noted of the thoracic spine.     Cardiac calcium score August 2020: 0     Medical problem list:  Type 2 diabetes-currently managed with metformin 500 mg twice daily . Hemoglobin A1c was 7.8 December 2023.  Hypothyroidism  Hypertension     Medication: Reviewed as documented  Allergies: Reviewed as documented     Surgeries:   Right breast biopsy  Cholecystectomy   Tonsillectomy  Thyroidectomy- for goiter  Lithotripsy  Left hip labral tear repair     Social history: .  Denies use of tobacco. Denies use of alcohol.  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     Family history:  Father- passed away from CAD, RA  Mother- diabetes, CAD  Paternal grandmother- RA    ROS:  General: Denies fevers or chills.  CV: Denies chest pain or palpitations.  Denies leg edema.  Lungs: Denies coughing or shortness of breath.  Skin: Denies rashes or nodules.  MS: Right arm claudication has improved, but she has had some cramping in the right hand and forearm over the last few days when doing things such as cooking.    Objective   /58 (BP Location: Left arm, Patient Position: Sitting, BP Cuff Size: Small adult)   Pulse 99   Temp 36.5 °C (97.7 °F)   Ht 1.626 m (5' 4\")   Wt 77.7 kg (171 " lb 3.2 oz)   SpO2 98%   BMI 29.39 kg/m²     Physical Exam  HEENT: PERRL, EOMI. S/p bilateral temporary biopsies.  Neck: Supple, no nodes.  CV: RRR, no MGR.  Lungs: Clear, no rales or wheezes.  Abdomen: Soft, nontender. No hepatosplenomegaly.  Extremities:  No cyanosis, clubbing, or edema.  MS: No synovitis.  Skin: No rashes or nodules.  Pulses: Radial and ulnar pulses are absent in the right upper extremity.  She has a weak left radial and ulnar pulse.  Blood pressure could not be obtained in either arm (recorded blood pressure was obtained on her home digital blood pressure cuff today).    Assessment/Plan   Problem List Items Addressed This Visit             ICD-10-CM    Type 2 diabetes mellitus without complication, without long-term current use of insulin (CMS/HCC) E11.9    Relevant Medications    glimepiride (AmaryL) 1 mg tablet    Giant cell arteritis (CMS/HCC) - Primary M31.6    Relevant Medications    tocilizumab (Actemra ACTPen) 162 mg/0.9 mL    Other Relevant Orders    Sedimentation Rate    C-reactive protein     Other Visit Diagnoses         Codes    High risk medication use     Z79.899    Relevant Orders    T-Spot TB    Hepatitis C antibody    Hepatitis B surface antigen    Hepatitis B core antibody, total    Hepatitis B surface antibody    Need for hepatitis B screening test     Z11.59    Relevant Orders    Hepatitis B surface antigen    Hepatitis B core antibody, total    Hepatitis B surface antibody    Need for hepatitis C screening test     Z11.59    Relevant Orders    Hepatitis C antibody            GCA-has large vessel involvement without cranial arteritis .  Presented with complaints of joint aching involving shoulders, neck, and  hips (less so hands and feet) since 9/22. (Likely PMR)  Complains of significant fatigue with 30 to 45 minutes of morning stiffness since summer 2023.  Also had right arm claudication since 12/23.  Initial labs remarkable for ESR of 39 and CRP 6.11 (normal less than  1).    CT angio chest with and without contrast done January 26, 2024 shows multifocal narrowing and vessel thickening in the right subclavian artery, including a long segment circumferential narrowing measuring 1.5 cm (with 75% narrowing).  There is abrupt narrowing and occlusion of the proximal right axillary artery.  There is mild to moderate narrowing of the distal left subclavian artery and mild to moderate narrowing of the proximal axillary artery on the left .  Findings are suspicious for large vessel vasculitis.     She started prednisone 30 mg twice daily February 2, 2024 with some improvement of her arm claudication.    I recommend adding Actemra 162 mg subcutaneous weekly.  I warned of risk of infection (bacterial, fungal, and AFB), risk of hematologic abnormalities, risk of hyperlipidemia, risk of elevated liver enzymes, and risk of bowel perforations.   We will start prior authorization.  If we can get Actemra approved, we will start tapering her prednisone every 2 weeks.    Unfortunately glucoses are running quite elevated.  Metformin  mg with dinner was added 2/12/24.  Our pharmacist is going to contact her on Monday to also assist with managing her diabetes.  I think if we can get her Dexcom monitor that may be very helpful.  For now, I recommend adding glimepiride 1 mg daily in the morning.  We may need to add an evening dose of Lantus insulin as well, but I will wait till the pharmacist talks to her as well.     Plan:   Check labs today: ESR, CRP, T spot, hep B and C screen.  Add Amaryl 1 mg daily every morning.  Start prior auth for Actemra 162 mg subcutaneous weekly.  Continue Citracal 500 mg twice daily.  Continue vitamin D 1000 international units daily.  Bone density will be done in the near future.  Follow up in 1 month.

## 2024-02-22 NOTE — PATIENT INSTRUCTIONS
Check labs today: ESR, CRP, T spot, hep B and C screen.  Add Amaryl 1 mg daily every morning.  Start prior auth for Actemra 162 mg subcutaneous weekly.  Continue Citracal 500 mg twice daily.  Continue vitamin D 1000 international units daily.  Bone density will be done in the near future.  Follow up in 1 month.

## 2024-02-23 LAB
HBV CORE AB SER QL: NONREACTIVE
HBV SURFACE AB SER-ACNC: <3.1 MIU/ML
HBV SURFACE AG SERPL QL IA: NONREACTIVE
HCV AB SER QL: NONREACTIVE

## 2024-02-25 LAB
NIL(NEG) CONTROL SPOT COUNT: NORMAL
PANEL A SPOT COUNT: 0
PANEL B SPOT COUNT: 0
POS CONTROL SPOT COUNT: NORMAL
T-SPOT. TB INTERPRETATION: NEGATIVE

## 2024-02-26 ENCOUNTER — TELEMEDICINE (OUTPATIENT)
Dept: PHARMACY | Facility: HOSPITAL | Age: 64
End: 2024-02-26
Payer: COMMERCIAL

## 2024-02-26 DIAGNOSIS — E11.9 TYPE 2 DIABETES MELLITUS WITHOUT COMPLICATION, WITHOUT LONG-TERM CURRENT USE OF INSULIN (MULTI): ICD-10-CM

## 2024-02-26 DIAGNOSIS — R73.9 HYPERGLYCEMIA: Primary | ICD-10-CM

## 2024-02-26 DIAGNOSIS — M31.6 LARGE VESSEL VASCULITIS (MULTI): ICD-10-CM

## 2024-02-26 RX ORDER — BLOOD-GLUCOSE SENSOR
EACH MISCELLANEOUS
Qty: 1 EACH | Refills: 0 | Status: SHIPPED | OUTPATIENT
Start: 2024-02-26 | End: 2024-03-11 | Stop reason: SDUPTHER

## 2024-02-26 RX ORDER — VITAMIN E (DL,TOCOPHERYL ACET) 45 MG/0.25
1 DROPS ORAL 2 TIMES DAILY
COMMUNITY

## 2024-02-26 ASSESSMENT — ENCOUNTER SYMPTOMS
FATIGUE: 1
VISUAL CHANGE: 1
POLYDIPSIA: 1
WEAKNESS: 1

## 2024-02-26 NOTE — ASSESSMENT & PLAN NOTE
Kimberley Murillo has uncontrolled type 2 diabetes mellitus complicated by steroid use and hypertension as evidenced by her most recent A1c of 7.8% on 12/29/23 which had increased from 6.6% on 05/22/23 however her control has since worsened as she's reporting fasting readings consistently in the 300s now with some as high as the 400-500s post-meals.   We discussed different medications options such as continuing to titrate up her glimepiride and metformin or starting long acting insulin. I explained to her that in her situation the insulin would be helpful as it would help get her blood sugar down quickly compared with titrating oral medications.   We also discussed CGM systems including Dexcom and Chava. Unfortunately I am unable to see what her insurance coverage of these is like due to issues with the pharmacy [place]. For now I have sent a prescription for the Freestyle Chava 3 for 1 sensor along with a free trial coupon to the [facility], as the Chava is the cheapest system if she has to pay out of pocket for the device.   START: Freestyle Chava 3 CGM system apply 1 sensor to back of arm for continuous blood sugar monitoring, remove old sensor and apply new sensor every 14 days  CONTINUE:   Metformin  mg 1 tablet by mouth once daily in the evening  Glimepiride 1 mg 1 tablet by mouth once daily in the morning with breakfast  She was scheduled for an in-person visit this Wednesday on 02/28/24 and baring any issues with pharmacy processing will  the sensor prior to her appointment so we can set it up in-office and have her linked to my [employer] account. The plan will be to then meet 1 week later over the phone and assess her Chava report as patient is hoping she will be able to use it to help with her diet to in turn help lower her blood sugars. If at that 1 week deb her blood sugars don't look like they are starting to improve with use of the system to watch what  she is eating, we will discuss starting long-acting insulin such as Lantus once daily at bedtime.

## 2024-02-26 NOTE — PROGRESS NOTES
Patient is sent at the request of Kelsey Lombardi,* for my opinion regarding Type 2 diabetes.  My final recommendations will be communicated back to the requesting provider by way of shared medical record.    Subjective     Diabetes  She presents for her initial diabetic visit. She has type 2 diabetes mellitus. The initial diagnosis of diabetes was made 1 year ago. Her disease course has been worsening. There are no hypoglycemic associated symptoms. Associated symptoms include fatigue, polydipsia, polyuria, visual change and weakness. There are no hypoglycemic complications. Symptoms are worsening. There are no diabetic complications. Risk factors for coronary artery disease include diabetes mellitus, dyslipidemia and hypertension (on steroid therapy). Current diabetic treatment includes oral agent (dual therapy). She is following a generally healthy diet. She rarely participates in exercise. Her overall blood glucose range is >200 mg/dl. An ACE inhibitor/angiotensin II receptor blocker is not being taken. She does not see a podiatrist.Eye exam is current.     Patient states that when her A1c increased in December she had not been watching her diet as much and had been eating more carb heavy meals. She was able to improve her diet and got her fasting down to the 110s prior to starting steroids.     Patient was started on Prednisone 30 mg twice daily on 02/02/24. Previous to this her diabetes was controlled by diet with no medications. Per last office visit note from visit with Dr. Elder on 02/22/24 her blood sugars had increased up into the 200s-500s. Metformin  mg once daily was started on 02/12/24. She was then started on Glimepiride 1 mg once daily in the morning on 02/22/24. She was referred to the clinical pharmacy team for assistance in diabetes medication management and potentially getting patient set up with a continuous glucose monitor.     Past Medical History:  She has a past medical history  of Headache, unspecified, Personal history of diseases of the blood and blood-forming organs and certain disorders involving the immune mechanism, and Personal history of pneumonia (recurrent).    Past Surgical History:  She has a past surgical history that includes Other surgical history (11/03/2022); Other surgical history (11/03/2022); Other surgical history (11/03/2022); Other surgical history (11/03/2022); Other surgical history (11/03/2022); Other surgical history (11/03/2022); Other surgical history (11/03/2022); and BI stereotactic guided breast right localization and biopsy (Right, 12/18/2023).    Social History:  She reports that she has never smoked. She has never used smokeless tobacco. She reports that she does not currently use alcohol. She reports that she does not use drugs.    Family History:  No family history on file.    Allergies:  Meloxicam, Salsalate, Statins-hmg-coa reductase inhibitors, Adhesive, and Latex    Current diet:   Breakfast: 1 piece of multi grain whole wheat bread with organic peanut butter and a little cinnamon; 1/2 cup of coffee with cream (states it is plain but not flavored) or black tea with cinnamon   States she has to take bread with her prednisone to avoid stomach upset   Lunch: if at home she will have a protein with vegetable (hamburger with zucchini, green beans, and spinach today), or a protein drink (if she's working) - 5 grams of carbs per protein drink   Dinner: tweaks what she makes the rest of her household, will use low-carb wraps with protein and vegetables (grilled chicken)   Snacks: nuts, beef sticks throughout the day   Fluids: water with lemon, occasionally with crystal light; does not use artificial sweeteners   States she avoids processed foods    Current exercise:   States she has lost 20 pounds over the past 3 weeks unintentionally   No resistance or strength training  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 or will walk around the store     The patient  "does have a known family history of diabetes (father, mother)     Patient is using: glucometer  The patient is currently checking the blood glucose every 3 hours since it started increasing   Fasting in the mornins-370s   400s after breakfast   Up to 500s at their highest (typically is the highest in the middle of the night)     Hypoglycemia frequency: none  Hypoglycemia awareness: Yes     Adverse Effects: reports a headache with glimepiride however has only taken 3 doses so far, states they do improve but it's causing her heart rate to go up to the 120s after taking the medication     Objective     Diabetes Pharmacotherapy:  Metformin  mg once daily in the evening with a meal  Glimepiride 1 mg once daily in the morning     Historical Diabetes Pharmacotherapy:   None    Primary/Secondary Prevention   Statin? No  ACE-I/ARB? No  Aspirin? No    Pertinent PMH Review:  PMH of Pancreatitis: No  PMH of Retinopathy: No  PMH of Urinary Tract Infections: No  PMH of MTC: No  PMH of MEN 2: No     Lab Review  Lab Results   Component Value Date    BILITOT 0.5 2023    CALCIUM 10.2 2024    CO2 27 2024     2024    CREATININE 0.73 2024    GLUCOSE 120 (H) 2024    ALKPHOS 127 2023    K 3.9 2024    PROT 6.9 2023     2024    AST 12 2023    ALT 13 2023    BUN 12 2024    ANIONGAP 14 2024    MG 2.09 2022    ALBUMIN 3.9 2023    GFRF >90 2023     Lab Results   Component Value Date    TRIG 107 2023    CHOL 211 (H) 2023    HDL 56.1 2023     Lab Results   Component Value Date    HGBA1C 7.8 (H) 2023    HGBA1C 6.6 (A) 2023    HGBA1C 7.0 (A) 2022     No components found for: \"UACR\"  The 10-year ASCVD risk score (Nathan MIRAMONTES, et al., 2019) is: 5.1%    Values used to calculate the score:      Age: 63 years      Sex: Female      Is Non- : No      Diabetic: Yes      " Tobacco smoker: No      Systolic Blood Pressure: 107 mmHg      Is BP treated: No      HDL Cholesterol: 56.1 mg/dL      Total Cholesterol: 211 mg/dL    Health Maintenance:   Foot Exam: None, patient states that Dr. Elder did look at her feet in-office recently   Eye Exam: 02/20/24  Lipid Panel:  mg/dL,  mg/dL 05/22/23  Urine Albumin: None   Influenza Immunization: does not get the flu shot   Pneumonia Immunization: none     Drug Interactions:  No significant drug-drug interactions exist requiring adjustment to medication therapy.     Assessment/Plan   Problem List Items Addressed This Visit       Type 2 diabetes mellitus without complication, without long-term current use of insulin (CMS/[facility])     Kimberley Murillo has uncontrolled type 2 diabetes mellitus complicated by steroid use and hypertension as evidenced by her most recent A1c of 7.8% on 12/29/23 which had increased from 6.6% on 05/22/23 however her control has since worsened as she's reporting fasting readings consistently in the 300s now with some as high as the 400-500s post-meals.   We discussed different medications options such as continuing to titrate up her glimepiride and metformin or starting long acting insulin. I explained to her that in her situation the insulin would be helpful as it would help get her blood sugar down quickly compared with titrating oral medications.   We also discussed CGM systems including Dexcom and Chava. Unfortunately I am unable to see what her insurance coverage of these is like due to issues with the pharmacy insurance billing [place]. For now I have sent a prescription for the Freestyle Chava 3 for 1 sensor along with a free trial coupon to the [facility], as the Chava is the cheapest system if she has to pay out of pocket for the device.   START: Freestyle Chava 3 CGM system apply 1 sensor to back of arm for continuous blood sugar monitoring, remove old sensor and apply new sensor every  14 days  CONTINUE:   Metformin  mg 1 tablet by mouth once daily in the evening  Glimepiride 1 mg 1 tablet by mouth once daily in the morning with breakfast  She was scheduled for an in-person visit this Wednesday on 02/28/24 and baring any issues with pharmacy processing will  the sensor prior to her appointment so we can set it up in-office and have her linked to my [employer] account. The plan will be to then meet 1 week later over the phone and assess her Chava report as patient is hoping she will be able to use it to help with her diet to in turn help lower her blood sugars. If at that 1 week deb her blood sugars don't look like they are starting to improve with use of the system to watch what she is eating, we will discuss starting long-acting insulin such as Lantus once daily at bedtime.           Other Visit Diagnoses       Hyperglycemia    -  Primary    Relevant Medications    blood-glucose sensor (FreeStyle Chava 3 Sensor) device    Other Relevant Orders    Follow Up In [facility] - Pharmacy    Large vessel vasculitis (CMS/HCC)              Pharmacy Follow-Up: 02/28/24 for Chava setup  Physician Follow-Up: 03/25/24    Obdulia DhillonD     Continue all meds under the continuation of care with the referring provider and clinical pharmacy team.

## 2024-02-27 ENCOUNTER — SPECIALTY PHARMACY (OUTPATIENT)
Dept: PHARMACY | Facility: CLINIC | Age: 64
End: 2024-02-27

## 2024-02-27 PROCEDURE — RXMED WILLOW AMBULATORY MEDICATION CHARGE

## 2024-02-28 ENCOUNTER — CLINICAL SUPPORT (OUTPATIENT)
Dept: PRIMARY CARE | Facility: CLINIC | Age: 64
End: 2024-02-28
Payer: COMMERCIAL

## 2024-02-28 ENCOUNTER — PHARMACY VISIT (OUTPATIENT)
Dept: PHARMACY | Facility: CLINIC | Age: 64
End: 2024-02-28
Payer: COMMERCIAL

## 2024-02-28 DIAGNOSIS — R73.9 HYPERGLYCEMIA: Primary | ICD-10-CM

## 2024-02-28 DIAGNOSIS — E11.9 TYPE 2 DIABETES MELLITUS WITHOUT COMPLICATION, WITHOUT LONG-TERM CURRENT USE OF INSULIN (MULTI): ICD-10-CM

## 2024-02-28 RX ORDER — INSULIN GLARGINE 100 [IU]/ML
15 INJECTION, SOLUTION SUBCUTANEOUS NIGHTLY
Qty: 15 ML | Refills: 1 | Status: SHIPPED | OUTPATIENT
Start: 2024-02-28 | End: 2024-03-05 | Stop reason: SDUPTHER

## 2024-02-28 RX ORDER — PEN NEEDLE, DIABETIC 30 GX3/16"
NEEDLE, DISPOSABLE MISCELLANEOUS
Qty: 100 EACH | Refills: 1 | Status: SHIPPED | OUTPATIENT
Start: 2024-02-28 | End: 2024-02-28 | Stop reason: SDUPTHER

## 2024-02-28 RX ORDER — PEN NEEDLE, DIABETIC 30 GX3/16"
NEEDLE, DISPOSABLE MISCELLANEOUS
Qty: 100 EACH | Refills: 1 | Status: SHIPPED | OUTPATIENT
Start: 2024-02-28 | End: 2024-03-19 | Stop reason: SDUPTHER

## 2024-02-28 RX ORDER — INSULIN GLARGINE 100 [IU]/ML
15 INJECTION, SOLUTION SUBCUTANEOUS NIGHTLY
Qty: 15 ML | Refills: 1 | Status: SHIPPED | OUTPATIENT
Start: 2024-02-28 | End: 2024-02-28 | Stop reason: SDUPTHER

## 2024-02-28 ASSESSMENT — ENCOUNTER SYMPTOMS
FATIGUE: 1
WEAKNESS: 1
POLYDIPSIA: 1
VISUAL CHANGE: 1

## 2024-02-28 NOTE — PROGRESS NOTES
Patient is sent at the request of Kelsey Lombardi,* for my opinion regarding Type 2 diabetes.  My final recommendations will be communicated back to the requesting provider by way of shared medical record.    Subjective     Diabetes  She presents for her follow-up diabetic visit. She has type 2 diabetes mellitus. The initial diagnosis of diabetes was made 1 year ago. Her disease course has been worsening. There are no hypoglycemic associated symptoms. Associated symptoms include fatigue, polydipsia, polyuria, visual change and weakness. There are no hypoglycemic complications. Symptoms are worsening. There are no diabetic complications. Risk factors for coronary artery disease include diabetes mellitus, dyslipidemia and hypertension (on steroid therapy). Current diabetic treatment includes oral agent (dual therapy). She is following a generally healthy diet. She rarely participates in exercise. Her overall blood glucose range is >200 mg/dl. An ACE inhibitor/angiotensin II receptor blocker is not being taken. She does not see a podiatrist.Eye exam is current.     Patient is here today for setup of her [employer] 3 free trial sensor.     Past Medical History:  She has a past medical history of Headache, unspecified, Personal history of diseases of the blood and blood-forming organs and certain disorders involving the immune mechanism, and Personal history of pneumonia (recurrent).    Past Surgical History:  She has a past surgical history that includes Other surgical history (11/03/2022); Other surgical history (11/03/2022); Other surgical history (11/03/2022); Other surgical history (11/03/2022); Other surgical history (11/03/2022); Other surgical history (11/03/2022); Other surgical history (11/03/2022); and BI stereotactic guided breast right localization and biopsy (Right, 12/18/2023).    Social History:  She reports that she has never smoked. She has never used smokeless tobacco. She reports that she does not  currently use alcohol. She reports that she does not use drugs.    Family History:  No family history on file.    Allergies:  Meloxicam, Salsalate, Statins-hmg-coa reductase inhibitors, Adhesive, and Latex    Current diet:   Breakfast: 1 piece of multi grain whole wheat bread with organic peanut butter and a little cinnamon; 1/2 cup of coffee with cream (states it is plain but not flavored) or black tea with cinnamon   States she has to take bread with her prednisone to avoid stomach upset   Lunch: if at home she will have a protein with vegetable (hamburger with zucchini, green beans, and spinach today), or a protein drink (if she's working) - 5 grams of carbs per protein drink   Dinner: tweaks what she makes the rest of her household, will use low-carb wraps with protein and vegetables (grilled chicken)   Snacks: nuts, beef sticks throughout the day   Fluids: water with lemon, occasionally with crystal light; does not use artificial sweeteners   States she avoids processed foods    Current exercise:   States she has lost 20 pounds over the past 3 weeks unintentionally   No resistance or strength training  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 or will walk around the store     The patient does have a known family history of diabetes (father, mother)     Patient is using: glucometer  The patient is currently checking the blood glucose every 3 hours since it started increasing   Fasting in the morning: ranging in the upper 300s   400s after breakfast   Up to 500s at their highest (typically is the highest in the middle of the night)     Hypoglycemia frequency: none  Hypoglycemia awareness: Yes     Adverse Effects: reports a headache, stomach upset, and generally not feeling good since starting glimepiride; she states it has been making it hard for her to concentrate in the mornings after taking it and she would like to stop the medication     Objective     Diabetes Pharmacotherapy:  Metformin  mg once daily  "in the evening with a meal  Glimepiride 1 mg once daily in the morning     Historical Diabetes Pharmacotherapy:   None    Primary/Secondary Prevention   Statin? No  ACE-I/ARB? No  Aspirin? No    Pertinent PMH Review:  PMH of Pancreatitis: No  PMH of Retinopathy: No  PMH of Urinary Tract Infections: No  PMH of MTC: No  PMH of MEN 2: No     Lab Review  Lab Results   Component Value Date    BILITOT 0.5 12/29/2023    CALCIUM 10.2 01/31/2024    CO2 27 01/31/2024     01/31/2024    CREATININE 0.73 01/31/2024    GLUCOSE 120 (H) 01/31/2024    ALKPHOS 127 12/29/2023    K 3.9 01/31/2024    PROT 6.9 12/29/2023     01/31/2024    AST 12 12/29/2023    ALT 13 12/29/2023    BUN 12 01/31/2024    ANIONGAP 14 01/31/2024    MG 2.09 07/25/2022    ALBUMIN 3.9 12/29/2023    GFRF >90 05/22/2023     Lab Results   Component Value Date    TRIG 107 05/22/2023    CHOL 211 (H) 05/22/2023    HDL 56.1 05/22/2023     Lab Results   Component Value Date    HGBA1C 7.8 (H) 12/29/2023    HGBA1C 6.6 (A) 05/22/2023    HGBA1C 7.0 (A) 07/25/2022     No components found for: \"UACR\"  The 10-year ASCVD risk score (Nathan DK, et al., 2019) is: 5.1%    Values used to calculate the score:      Age: 63 years      Sex: Female      Is Non- : No      Diabetic: Yes      Tobacco smoker: No      Systolic Blood Pressure: 107 mmHg      Is BP treated: No      HDL Cholesterol: 56.1 mg/dL      Total Cholesterol: 211 mg/dL    Health Maintenance:   Foot Exam: None, patient states that Dr. Elder did look at her feet in-office recently   Eye Exam: 02/20/24  Lipid Panel:  mg/dL,  mg/dL 05/22/23  Urine Albumin: None   Influenza Immunization: does not get the flu shot   Pneumonia Immunization: none     Drug Interactions:  No significant drug-drug interactions exist requiring adjustment to medication therapy.     Assessment/Plan   Problem List Items Addressed This Visit       Type 2 diabetes mellitus without complication, without " long-term current use of insulin (CMS/[facility])     Kimberley Murillo has uncontrolled type 2 diabetes mellitus complicated by steroid use and hypertension as evidenced by her most recent A1c of 7.8% on 12/29/23 which had increased from 6.6% on 05/22/23. She still reports blood sugar readings in the high 300s fasting and 400s-500s after meals.   We were unable to get her Chava sensor set up today as patient did not have her password to her phone readily available to download the [employer]. She was instructed to take it home, get the [employer] and Chava account set up. I will then call her on Friday and will walk her through applying the sensor and setting up sharing with myself.   Patient states she is struggling to tolerate the glimepiride and would like to stop it. We discussed stopping glimepiride however patient would then have to start long acting insulin to help bring her blood sugars down. She was agreeable to this.   START: Insulin glargine (Lantus Solostar) 100 unit/mL inject 15 units under the skin once daily at bedtime   STOP: Glimepiride 1 mg   CONTINUE: Metformin  mg 1 tablet by mouth once daily in the evening with a meal   Patient Education:   Insulin demonstration using demo pen was performed with patient and her  during appointment today. Patient was shown how to prime the pen, apply the pen needle, dial her dose, and how to give the dose.   She was also informed that she should wipe down the injection site with alcohol prior to injecting and dispose of her used pen needles into a sharps container.   Patient verbalized understanding on the administration of the once daily insulin. She did not want to use the demo pen to practice during the encounter.   Both patient and  were encouraged to look at videos on Lantus' website to help with administration if needed.   They were also educated on signs/symptoms of hypoglycemia and how to treat (rule of 15) if needed. They were informed that the insulin will  "always lower blood sugar regardless of how low her readings are.   We will schedule a telephone follow-up 2 weeks after patient is able to place her Chava sensor which she will hopefully get placed by this Friday and will schedule the appointment at that time.           Other Visit Diagnoses       Hyperglycemia    -  Primary    Relevant Medications    insulin glargine (Lantus Solostar U-100 Insulin) 100 unit/mL (3 mL) pen    pen needle, diabetic 32 gauge x 5/32\" needle          Pharmacy Follow-Up: To be determined once patient gets Chava sensor and waldemar set up  Physician Follow-Up: 03/25/24    Steven Gilbert, PharmD     Continue all meds under the continuation of care with the referring provider and clinical pharmacy team.    "

## 2024-02-28 NOTE — ASSESSMENT & PLAN NOTE
Kimberley Murillo has uncontrolled type 2 diabetes mellitus complicated by steroid use and hypertension as evidenced by her most recent A1c of 7.8% on 12/29/23 which had increased from 6.6% on 05/22/23. She still reports blood sugar readings in the high 300s fasting and 400s-500s after meals.   We were unable to get her Chava sensor set up today as patient did not have her password to her phone readily available to download the waldemar. She was instructed to take it home, get the [employer] and Chava account set up. I will then call her on Friday and will walk her through applying the sensor and setting up sharing with myself.   Patient states she is struggling to tolerate the glimepiride and would like to stop it. We discussed stopping glimepiride however patient would then have to start long acting insulin to help bring her blood sugars down. She was agreeable to this.   START: Insulin glargine (Lantus Solostar) 100 unit/mL inject 15 units under the skin once daily at bedtime   STOP: Glimepiride 1 mg   CONTINUE: Metformin  mg 1 tablet by mouth once daily in the evening with a meal   Patient Education:   Insulin demonstration using demo pen was performed with patient and her  during appointment today. Patient was shown how to prime the pen, apply the pen needle, dial her dose, and how to give the dose.   She was also informed that she should wipe down the injection site with alcohol prior to injecting and dispose of her used pen needles into a sharps container.   Patient verbalized understanding on the administration of the once daily insulin. She did not want to use the demo pen to practice during the encounter.   Both patient and  were encouraged to look at videos on Lantus' website to help with administration if needed.   They were also educated on signs/symptoms of hypoglycemia and how to treat (rule of 15) if needed. They were informed that the insulin will always lower blood sugar regardless of how low  her readings are.   We will schedule a telephone follow-up 2 weeks after patient is able to place her Chava sensor which she will hopefully get placed by this Friday and will schedule the appointment at that time.

## 2024-03-01 ENCOUNTER — TELEPHONE (OUTPATIENT)
Dept: PRIMARY CARE | Facility: CLINIC | Age: 64
End: 2024-03-01
Payer: COMMERCIAL

## 2024-03-01 DIAGNOSIS — E11.9 TYPE 2 DIABETES MELLITUS WITHOUT COMPLICATION, WITHOUT LONG-TERM CURRENT USE OF INSULIN (MULTI): Primary | ICD-10-CM

## 2024-03-01 DIAGNOSIS — M31.6: Primary | ICD-10-CM

## 2024-03-01 NOTE — TELEPHONE ENCOUNTER
Discussed lab results 3/01/24. Screening test for tuberculosis, hepatitis B and C are negative.  CRP is improved, still slightly elevated.  ESR is currently normal.  She is still having blood sugars 300s to 500.  Lantus insulin has been added.  She now has a continuous glucose monitor.  She is still getting some cramping in her arms and hands when she is doing things such as cutting vegetables.  She has completed 1 month of prednisone 30 mg 2x daily.  I recommend reduce prednisone to 50 mg daily-she prefers cutting the dose because it upsets her stomach.  She will take 30 mg in the morning and 20 mg in the evening.  I recommend repeat ESR and CRP in 2 weeks.  Dr. Elder

## 2024-03-05 ENCOUNTER — TELEPHONE (OUTPATIENT)
Dept: PHARMACY | Facility: HOSPITAL | Age: 64
End: 2024-03-05
Payer: COMMERCIAL

## 2024-03-05 DIAGNOSIS — R73.9 HYPERGLYCEMIA: ICD-10-CM

## 2024-03-05 RX ORDER — INSULIN GLARGINE 100 [IU]/ML
20 INJECTION, SOLUTION SUBCUTANEOUS NIGHTLY
Qty: 15 ML | Refills: 1
Start: 2024-03-05 | End: 2024-03-12 | Stop reason: SDUPTHER

## 2024-03-05 NOTE — TELEPHONE ENCOUNTER
Patient reached out to let me know that her blood sugars have continued to be elevated significantly since we last spoke. I have attached her Chava report over the past week. Patient does state, however, that when her blood sugars are significantly high she feels like the chava is not as accurate as her fingersticks up to ~100 points off.     Patient's evening prednisone was decreased down to 2 tablets in the evening but still 3 tablets in the morning.     Based on her readings below, we will increase her insulin up to 20 units once daily today. We will continue to monitor and follow-up in ~1 week. At that time we will likely increase her insulin further up to 25 units once daily as long as her blood sugars still remain significantly elevated. We'll continue to meet once weekly moving forward to adjust her insulin as needed.               Pharmacy Follow-Up: 03/12/24  Follow-Up with Dr. Elder: 03/25/24    Please reach out with any questions or concerns.     Thank you,     Steven Gilbert, PharmD   P: [phone]

## 2024-03-11 DIAGNOSIS — E11.9 TYPE 2 DIABETES MELLITUS WITHOUT COMPLICATION, WITHOUT LONG-TERM CURRENT USE OF INSULIN (MULTI): Primary | ICD-10-CM

## 2024-03-11 RX ORDER — BLOOD-GLUCOSE SENSOR
EACH MISCELLANEOUS
Qty: 9 EACH | Refills: 3 | Status: SHIPPED | OUTPATIENT
Start: 2024-03-11 | End: 2024-04-30 | Stop reason: ALTCHOICE

## 2024-03-11 RX ORDER — BLOOD-GLUCOSE,RECEIVER,CONT
EACH MISCELLANEOUS
Qty: 1 EACH | Refills: 0 | Status: SHIPPED | OUTPATIENT
Start: 2024-03-11 | End: 2024-04-30 | Stop reason: ALTCHOICE

## 2024-03-11 ASSESSMENT — ENCOUNTER SYMPTOMS
POLYDIPSIA: 1
VISUAL CHANGE: 1
FATIGUE: 1
WEAKNESS: 1

## 2024-03-11 NOTE — PROGRESS NOTES
Patient is sent at the request of Kelsey Lombardi,* for my opinion regarding Type 2 diabetes.  My final recommendations will be communicated back to the requesting provider by way of shared medical record.    Subjective     Diabetes  She presents for her follow-up diabetic visit. She has type 2 diabetes mellitus. The initial diagnosis of diabetes was made 1 year ago. Her disease course has been worsening. There are no hypoglycemic associated symptoms. Associated symptoms include fatigue, polydipsia, polyuria, visual change and weakness. There are no hypoglycemic complications. Symptoms are worsening. There are no diabetic complications. Risk factors for coronary artery disease include diabetes mellitus, dyslipidemia and hypertension (on steroid therapy). Current diabetic treatment includes oral agent (dual therapy). She is compliant with treatment all of the time. She is following a generally healthy diet. She rarely participates in exercise. Her overall blood glucose range is >200 mg/dl. An ACE inhibitor/angiotensin II receptor blocker is not being taken. She does not see a podiatrist.Eye exam is current.     Since our last discussion last week patient reports that her daughter was in the hospital and was found to have COVID. Her  has also tested positive. Patient states she was sick back on 03/02 and has had a stuffy nose since and stated that she had a faint line on her COVID test, indicating that she had had COVID too. She believes that her symptoms started on 03/02 though and have improved.     Past Medical History:  She has a past medical history of Headache, unspecified, Personal history of diseases of the blood and blood-forming organs and certain disorders involving the immune mechanism, and Personal history of pneumonia (recurrent).    Past Surgical History:  She has a past surgical history that includes Other surgical history (11/03/2022); Other surgical history (11/03/2022); Other surgical  history (11/03/2022); Other surgical history (11/03/2022); Other surgical history (11/03/2022); Other surgical history (11/03/2022); Other surgical history (11/03/2022); and BI stereotactic guided breast right localization and biopsy (Right, 12/18/2023).    Social History:  She reports that she has never smoked. She has never used smokeless tobacco. She reports that she does not currently use alcohol. She reports that she does not use drugs.    Family History:  No family history on file.    Allergies:  Meloxicam, Salsalate, Glimepiride, Statins-hmg-coa reductase inhibitors, Adhesive, and Latex    Current diet:   Breakfast: 1 piece of multi grain whole wheat bread with organic peanut butter and a little cinnamon; 1/2 cup of coffee with cream (states it is plain but not flavored) or black tea with cinnamon   States she has to take bread with her prednisone to avoid stomach upset   Lunch: if at home she will have a protein with vegetable (hamburger with zucchini, green beans, and spinach today), or a protein drink (if she's working) - 5 grams of carbs per protein drink   Dinner: tweaks what she makes the rest of her household, will use low-carb wraps with protein and vegetables (grilled chicken)   Snacks: nuts, beef sticks throughout the day   Fluids: water with lemon, occasionally with crystal light; does not use artificial sweeteners   States she avoids processed foods    Current exercise:   States she has lost 20 pounds over the past 3 weeks unintentionally   No resistance or strength training  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 or will walk around the store     The patient does have a known family history of diabetes (father, mother)     Patient is using: glucometer and continuous glucose monitor        Hypoglycemia frequency: none  Hypoglycemia awareness: Yes     Adverse Effects: reports a headache, stomach upset, and generally not feeling good since starting glimepiride; she states it has been making it hard  "for her to concentrate in the mornings after taking it and she would like to stop the medication     Objective     Diabetes Pharmacotherapy:  Metformin  mg once daily in the evening with a meal  Insulin glargine (Lantus Solostar) 20 units daily at bedtime     Historical Diabetes Pharmacotherapy:   Glimepiride 1 mg (patient reported     Primary/Secondary Prevention   Statin? No  ACE-I/ARB? No  Aspirin? No    Pertinent PMH Review:  PMH of Pancreatitis: No  PMH of Retinopathy: No  PMH of Urinary Tract Infections: No  PMH of MTC: No  PMH of MEN 2: No     Lab Review  Lab Results   Component Value Date    BILITOT 0.5 12/29/2023    CALCIUM 10.2 01/31/2024    CO2 27 01/31/2024     01/31/2024    CREATININE 0.73 01/31/2024    GLUCOSE 120 (H) 01/31/2024    ALKPHOS 127 12/29/2023    K 3.9 01/31/2024    PROT 6.9 12/29/2023     01/31/2024    AST 12 12/29/2023    ALT 13 12/29/2023    BUN 12 01/31/2024    ANIONGAP 14 01/31/2024    MG 2.09 07/25/2022    ALBUMIN 3.9 12/29/2023    GFRF >90 05/22/2023     Lab Results   Component Value Date    TRIG 107 05/22/2023    CHOL 211 (H) 05/22/2023    HDL 56.1 05/22/2023     Lab Results   Component Value Date    HGBA1C 7.8 (H) 12/29/2023    HGBA1C 6.6 (A) 05/22/2023    HGBA1C 7.0 (A) 07/25/2022     No components found for: \"UACR\"  The 10-year ASCVD risk score (Nathan MIRAMONTES, et al., 2019) is: 5.1%    Values used to calculate the score:      Age: 63 years      Sex: Female      Is Non- : No      Diabetic: Yes      Tobacco smoker: No      Systolic Blood Pressure: 107 mmHg      Is BP treated: No      HDL Cholesterol: 56.1 mg/dL      Total Cholesterol: 211 mg/dL    Health Maintenance:   Foot Exam: None, patient states that Dr. Elder did look at her feet in-office recently   Eye Exam: 02/20/24  Lipid Panel:  mg/dL,  mg/dL 05/22/23  Urine Albumin: None   Influenza Immunization: does not get the flu shot   Pneumonia Immunization: none     Drug " Interactions:  No significant drug-drug interactions exist requiring adjustment to medication therapy.     Assessment/Plan   Problem List Items Addressed This Visit       Type 2 diabetes mellitus without complication, without long-term current use of insulin (CMS/[facility])     Kimberley Murillo has uncontrolled type 2 diabetes mellitus complicated by steroid use and hypertension as evidenced by her most recent A1c of 7.8% on 12/29/23 which had increased from 6.6% on 05/22/23. Her time in range over the past 4 weeks has been 0% and time >250 mg/dL has been 91%. She increased her Lantus up to 20 units once daily on 03/05 and her blood sugars continue to be significantly elevated.   Patient did likely have COVID with symptoms starting back on 03/02/24 and patient reporting she still has congestion today. We discussed recommendations for mask wearing and isolation including that it is recommended to isolate for 5 days from symptom onset and to wear a mask in public through day 10 after symptom onset which would be today. It is possible that the infection may have also played a role in some of her elevated blood sugars.   CHANGE: Insulin glargine (Lantus Solostar) 100 unit/mL 35 units under the skin once daily (increased from 20 units once daily)  Since 20 units of Lantus have not really touched her blood sugars I think she will tolerate an increase by 15 units well without hypoglycemia. She is aware of signs and symptoms of hypoglycemia and how to treat  CONTINUE: Metformin  mg 1 tablet by mouth once daily in the evening with a meal  Information to try and get Dexcom covered will be faxed over to her insurance's preferred DME company today. In the meantime a prescription for 1 month worth of Chava 3 sensors was sent to patient's home pharmacy. She will use the co-pay coupon to help bring the cost down and will pay for 1 month.   Patient also is aware that Dr. Elder ordered labwork that she needed to have drawn on  03/15/24. Patient stated that due to her family all having COVID she may not be able to go on this date to have the labwork drawn. She states that she will make sure to get it done at least by the Monday or Tuesday the week prior to her next appointment.   We will follow-up in 1 week to see how her blood sugars have reacted to the increased insulin dose. Patient encouraged to reach out to myself with any questions, concerns, or issues prior to our next follow-up.         Relevant Medications    blood-glucose sensor (FreeStyle Chava 3 Sensor) device    insulin glargine (Lantus Solostar U-100 Insulin) 100 unit/mL (3 mL) pen    Other Relevant Orders    Follow Up In [facility] Pharmacy     Other Visit Diagnoses       Hyperglycemia        Relevant Medications    insulin glargine (Lantus Solostar U-100 Insulin) 100 unit/mL (3 mL) pen          Pharmacy Follow-Up: 03/19/24   Physician Follow-Up: 03/25/24    Obdulia DhillonD     Continue all meds under the continuation of care with the referring provider and clinical pharmacy team.

## 2024-03-11 NOTE — PROGRESS NOTES
Patient's insurance does not cover CGMs under prescription benefits. Prescriptions were printed for Dr. Elder to sign to be submitted to [facility] for attempt at coverage under medical benefits.

## 2024-03-12 ENCOUNTER — TELEPHONE (OUTPATIENT)
Dept: PRIMARY CARE | Facility: CLINIC | Age: 64
End: 2024-03-12

## 2024-03-12 ENCOUNTER — TELEMEDICINE (OUTPATIENT)
Dept: PHARMACY | Facility: HOSPITAL | Age: 64
End: 2024-03-12
Payer: COMMERCIAL

## 2024-03-12 DIAGNOSIS — R73.9 HYPERGLYCEMIA: ICD-10-CM

## 2024-03-12 DIAGNOSIS — E11.9 TYPE 2 DIABETES MELLITUS WITHOUT COMPLICATION, WITHOUT LONG-TERM CURRENT USE OF INSULIN (MULTI): ICD-10-CM

## 2024-03-12 RX ORDER — INSULIN GLARGINE 100 [IU]/ML
35 INJECTION, SOLUTION SUBCUTANEOUS NIGHTLY
Qty: 15 ML | Refills: 1 | Status: SHIPPED | OUTPATIENT
Start: 2024-03-12 | End: 2024-03-13 | Stop reason: SDUPTHER

## 2024-03-12 RX ORDER — BLOOD-GLUCOSE SENSOR
EACH MISCELLANEOUS
Qty: 2 EACH | Refills: 0 | Status: SHIPPED | OUTPATIENT
Start: 2024-03-12 | End: 2024-03-13 | Stop reason: SDUPTHER

## 2024-03-12 NOTE — ASSESSMENT & PLAN NOTE
Kimberley Murillo has uncontrolled type 2 diabetes mellitus complicated by steroid use and hypertension as evidenced by her most recent A1c of 7.8% on 12/29/23 which had increased from 6.6% on 05/22/23. Her time in range over the past 4 weeks has been 0% and time >250 mg/dL has been 91%. She increased her Lantus up to 20 units once daily on 03/05 and her blood sugars continue to be significantly elevated.   Patient did likely have COVID with symptoms starting back on 03/02/24 and patient reporting she still has congestion today. We discussed recommendations for mask wearing and isolation including that it is recommended to isolate for 5 days from symptom onset and to wear a mask in public through day 10 after symptom onset which would be today. It is possible that the infection may have also played a role in some of her elevated blood sugars.   CHANGE: Insulin glargine (Lantus Solostar) 100 unit/mL 35 units under the skin once daily (increased from 20 units once daily)  Since 20 units of Lantus have not really touched her blood sugars I think she will tolerate an increase by 15 units well without hypoglycemia. She is aware of signs and symptoms of hypoglycemia and how to treat  CONTINUE: Metformin  mg 1 tablet by mouth once daily in the evening with a meal  Information to try and get Dexcom covered will be faxed over to her insurance's preferred DME company today. In the meantime a prescription for 1 month worth of Chava 3 sensors was sent to patient's home pharmacy. She will use the co-pay coupon to help bring the cost down and will pay for 1 month.   Patient also is aware that Dr. Elder ordered labwork that she needed to have drawn on 03/15/24. Patient stated that due to her family all having COVID she may not be able to go on this date to have the labwork drawn. She states that she will make sure to get it done at least by the Monday or Tuesday the week prior to her next appointment.   We will follow-up in 1  week to see how her blood sugars have reacted to the increased insulin dose. Patient encouraged to reach out to myself with any questions, concerns, or issues prior to our next follow-up.

## 2024-03-12 NOTE — TELEPHONE ENCOUNTER
I spoke with patient.  Her insurance denied injectable Actemra.  She is agreeable to trying IV Actemra 6 mg/kg IV every 28 days.  We will try to see if we can get this approved with her insurance company.  Her blood sugars are still running high in the evening.  She is only currently on metformin and insulin was increased to 35 units of Lantus every evening.  Prednisone was reduced about 1 week ago to 50 mg daily.  She also notes that she tested positive for COVID in the last few days.  She was however sick about 2 weeks ago, she thinks she may have had it then.  Her  and daughter have not now.  The patient's only symptom is minimal stuffy nose.  She will let me know if things get worse.  Dr. Elder

## 2024-03-13 ENCOUNTER — DOCUMENTATION (OUTPATIENT)
Dept: INFUSION THERAPY | Facility: CLINIC | Age: 64
End: 2024-03-13
Payer: COMMERCIAL

## 2024-03-13 DIAGNOSIS — M31.6 GIANT CELL ARTERITIS (MULTI): Primary | ICD-10-CM

## 2024-03-13 DIAGNOSIS — E11.9 TYPE 2 DIABETES MELLITUS WITHOUT COMPLICATION, WITHOUT LONG-TERM CURRENT USE OF INSULIN (MULTI): ICD-10-CM

## 2024-03-13 DIAGNOSIS — R73.9 HYPERGLYCEMIA: ICD-10-CM

## 2024-03-13 RX ORDER — BLOOD-GLUCOSE SENSOR
EACH MISCELLANEOUS
Qty: 2 EACH | Refills: 0 | Status: SHIPPED | OUTPATIENT
Start: 2024-03-13 | End: 2024-04-06

## 2024-03-13 RX ORDER — DIPHENHYDRAMINE HYDROCHLORIDE 50 MG/ML
50 INJECTION INTRAMUSCULAR; INTRAVENOUS AS NEEDED
Status: CANCELLED | OUTPATIENT
Start: 2024-03-25

## 2024-03-13 RX ORDER — ALBUTEROL SULFATE 0.83 MG/ML
3 SOLUTION RESPIRATORY (INHALATION) AS NEEDED
Status: CANCELLED | OUTPATIENT
Start: 2024-03-25

## 2024-03-13 RX ORDER — FAMOTIDINE 10 MG/ML
20 INJECTION INTRAVENOUS ONCE AS NEEDED
Status: CANCELLED | OUTPATIENT
Start: 2024-03-25

## 2024-03-13 RX ORDER — INSULIN GLARGINE 100 [IU]/ML
35 INJECTION, SOLUTION SUBCUTANEOUS NIGHTLY
Qty: 15 ML | Refills: 1 | Status: SHIPPED | OUTPATIENT
Start: 2024-03-13 | End: 2024-03-19 | Stop reason: SDUPTHER

## 2024-03-13 RX ORDER — EPINEPHRINE 0.3 MG/.3ML
0.3 INJECTION SUBCUTANEOUS EVERY 5 MIN PRN
Status: CANCELLED | OUTPATIENT
Start: 2024-03-25

## 2024-03-13 NOTE — PROGRESS NOTES
New start Actemra IV - patient's current insurance plan does not cover Actemra subcutaneous injections. Sending through updated IV orders - 6mg/kg once every 4 weeks to the [facility] - NR.

## 2024-03-13 NOTE — PROGRESS NOTES
Patient requested prescriptions be sent to [facility]. Scripts sent to [facility] were cancelled and new prescriptions sent over to [facility].

## 2024-03-13 NOTE — PROGRESS NOTES
"Patient to be scheduled for New Start of Actemra infusions.     For Diagnosis: Giant Cell Arthritis     Dosing is weight based at: 6 mg / kg     Using Dosing weight of: 77.7 kg    For a Total dose of: 460 mg every 4 weeks.    Labs..  T Spot Drawn/Results:   Lab Results   Component Value Date    TBSIN Negative 02/22/2024      Hep B Drawn/Results:  Lab Results   Component Value Date    HEPBSAG Nonreactive 02/22/2024        Lipids drawn:   Lab Results   Component Value Date    CHOL 211 (H) 05/22/2023    CHOL 217 (H) 07/25/2022    CHOL 249 (H) 11/09/2021     Lab Results   Component Value Date    HDL 56.1 05/22/2023    HDL 48.3 07/25/2022    HDL 53.0 11/09/2021     No results found for: \"LDLCALC\"  Lab Results   Component Value Date    TRIG 107 05/22/2023    TRIG 183 (H) 07/25/2022    TRIG 154 (H) 11/09/2021     No components found for: \"CHOLHDL\"     CBC w/ diff drawn:   Lab Results   Component Value Date    WBC 9.1 01/31/2024    HGB 12.3 01/31/2024    HCT 39.3 01/31/2024    MCV 86 01/31/2024     01/31/2024        WBC:   Lab Results   Component Value Date    WBC 9.1 01/31/2024        Plt:   Lab Results   Component Value Date     01/31/2024      (Initial: >=100,000. Hold/contact prescribing provider if: <=100,000)    ANC:   Lab Results   Component Value Date    NEUTROABS 5.75 01/31/2024      (Initial: >2000   Hold/contact prescribing provider if:  <1000)    ALT:   Lab Results   Component Value Date    ALT 13 12/29/2023      (Initial: <1.5X ULN (and/or)  Hold/contact prescribing provider if: >3X ULN)    AST:   Lab Results   Component Value Date    ALT 13 12/29/2023    AST 12 12/29/2023    ALKPHOS 127 12/29/2023    BILITOT 0.5 12/29/2023        Chemistry    Lab Results   Component Value Date/Time     01/31/2024 1613    K 3.9 01/31/2024 1613     01/31/2024 1613    CO2 27 01/31/2024 1613    BUN 12 01/31/2024 1613    CREATININE 0.73 01/31/2024 1613    Lab Results   Component Value Date/Time    CALCIUM " 10.2 01/31/2024 1613    ALKPHOS 127 12/29/2023 1128    AST 12 12/29/2023 1128    ALT 13 12/29/2023 1128    BILITOT 0.5 12/29/2023 1128           (Initial: <1.5X ULN (and/or) Hold/contact prescribing provider if : >3X ULN)    Urine Hcg test ordered piror to first infusion? No (If female pt <60 years of age and with reproductive capability)   (If urine Hcg test ordered please instruct pt upon scheduling to drink 32 ounces of water 1 hour before arrival so bladder is full for needed urine sample)    History of malignancy or GI perforation? No  Patient Active Problem List   Diagnosis    Type 2 diabetes mellitus without complication, without long-term current use of insulin (CMS/HCC)    Hypertension    Murmur    Abnormal finding on breast imaging    Thoracic outlet syndrome    Acquired hypothyroidism    Alternating constipation and diarrhea    Arthralgia of hip    Arthritis    Chronic headache disorder    Diffuse cystic mastopathy    Fatigue    History of anemia    Hypercholesteremia    Impaired fasting glucose    Insomnia, idiopathic    Migraine headache    Muscle cramps    Numbness of hand    Obesity due to energy imbalance    Overactive bladder    Pain of right upper extremity    Pelvic floor dysfunction in female    Vitamin D deficiency    Giant cell arteritis (CMS/HCC)      Past Medical History:   Diagnosis Date    Headache, unspecified     Chronic headache    Personal history of diseases of the blood and blood-forming organs and certain disorders involving the immune mechanism     History of anemia    Personal history of pneumonia (recurrent)     History of pneumonia        Last infusion received: N/A (if continuation)   Due: Patient will be called once prior authorization is processed. NR [facility] will call patient once processed to schedule infusion.     These results meet criteria to treat.

## 2024-03-15 ENCOUNTER — LAB (OUTPATIENT)
Dept: LAB | Facility: LAB | Age: 64
End: 2024-03-15
Payer: COMMERCIAL

## 2024-03-16 ENCOUNTER — LAB (OUTPATIENT)
Dept: LAB | Facility: LAB | Age: 64
End: 2024-03-16
Payer: COMMERCIAL

## 2024-03-16 DIAGNOSIS — M31.6 GIANT CELL ARTERITIS (MULTI): ICD-10-CM

## 2024-03-16 DIAGNOSIS — M31.6: ICD-10-CM

## 2024-03-16 PROCEDURE — 80061 LIPID PANEL: CPT

## 2024-03-16 PROCEDURE — 86140 C-REACTIVE PROTEIN: CPT

## 2024-03-16 PROCEDURE — 36415 COLL VENOUS BLD VENIPUNCTURE: CPT

## 2024-03-16 PROCEDURE — 85652 RBC SED RATE AUTOMATED: CPT

## 2024-03-16 PROCEDURE — 80053 COMPREHEN METABOLIC PANEL: CPT

## 2024-03-16 PROCEDURE — 85025 COMPLETE CBC W/AUTO DIFF WBC: CPT

## 2024-03-17 LAB
ALBUMIN SERPL BCP-MCNC: 3.7 G/DL (ref 3.4–5)
ALP SERPL-CCNC: 78 U/L (ref 33–136)
ALT SERPL W P-5'-P-CCNC: 28 U/L (ref 7–45)
ANION GAP SERPL CALC-SCNC: 15 MMOL/L (ref 10–20)
AST SERPL W P-5'-P-CCNC: 12 U/L (ref 9–39)
BASOPHILS # BLD AUTO: 0.06 X10*3/UL (ref 0–0.1)
BASOPHILS NFR BLD AUTO: 0.4 %
BILIRUB SERPL-MCNC: 0.6 MG/DL (ref 0–1.2)
BUN SERPL-MCNC: 16 MG/DL (ref 6–23)
CALCIUM SERPL-MCNC: 10.3 MG/DL (ref 8.6–10.6)
CHLORIDE SERPL-SCNC: 99 MMOL/L (ref 98–107)
CHOLEST SERPL-MCNC: 223 MG/DL (ref 0–199)
CHOLESTEROL/HDL RATIO: 3.4
CO2 SERPL-SCNC: 29 MMOL/L (ref 21–32)
CREAT SERPL-MCNC: 0.63 MG/DL (ref 0.5–1.05)
CRP SERPL-MCNC: 0.94 MG/DL
EGFRCR SERPLBLD CKD-EPI 2021: >90 ML/MIN/1.73M*2
EOSINOPHIL # BLD AUTO: 0 X10*3/UL (ref 0–0.7)
EOSINOPHIL NFR BLD AUTO: 0 %
ERYTHROCYTE [DISTWIDTH] IN BLOOD BY AUTOMATED COUNT: 14.9 % (ref 11.5–14.5)
ERYTHROCYTE [SEDIMENTATION RATE] IN BLOOD BY WESTERGREN METHOD: 23 MM/H (ref 0–30)
GLUCOSE SERPL-MCNC: 197 MG/DL (ref 74–99)
HCT VFR BLD AUTO: 40.2 % (ref 36–46)
HDLC SERPL-MCNC: 65.7 MG/DL
HGB BLD-MCNC: 13 G/DL (ref 12–16)
IMM GRANULOCYTES # BLD AUTO: 0.28 X10*3/UL (ref 0–0.7)
IMM GRANULOCYTES NFR BLD AUTO: 2.1 % (ref 0–0.9)
LDLC SERPL CALC-MCNC: 128 MG/DL
LYMPHOCYTES # BLD AUTO: 2.06 X10*3/UL (ref 1.2–4.8)
LYMPHOCYTES NFR BLD AUTO: 15.2 %
MCH RBC QN AUTO: 27.3 PG (ref 26–34)
MCHC RBC AUTO-ENTMCNC: 32.3 G/DL (ref 32–36)
MCV RBC AUTO: 85 FL (ref 80–100)
MONOCYTES # BLD AUTO: 0.94 X10*3/UL (ref 0.1–1)
MONOCYTES NFR BLD AUTO: 6.9 %
NEUTROPHILS # BLD AUTO: 10.24 X10*3/UL (ref 1.2–7.7)
NEUTROPHILS NFR BLD AUTO: 75.4 %
NON HDL CHOLESTEROL: 157 MG/DL (ref 0–149)
NRBC BLD-RTO: 0 /100 WBCS (ref 0–0)
PLATELET # BLD AUTO: 266 X10*3/UL (ref 150–450)
POTASSIUM SERPL-SCNC: 4.5 MMOL/L (ref 3.5–5.3)
PROT SERPL-MCNC: 6.1 G/DL (ref 6.4–8.2)
RBC # BLD AUTO: 4.76 X10*6/UL (ref 4–5.2)
SODIUM SERPL-SCNC: 138 MMOL/L (ref 136–145)
TRIGL SERPL-MCNC: 147 MG/DL (ref 0–149)
VLDL: 29 MG/DL (ref 0–40)
WBC # BLD AUTO: 13.6 X10*3/UL (ref 4.4–11.3)

## 2024-03-18 DIAGNOSIS — M31.6 GIANT CELL ARTERITIS (MULTI): ICD-10-CM

## 2024-03-18 DIAGNOSIS — J01.00 ACUTE NON-RECURRENT MAXILLARY SINUSITIS: Primary | ICD-10-CM

## 2024-03-18 RX ORDER — TOCILIZUMAB 180 MG/ML
162 INJECTION, SOLUTION SUBCUTANEOUS
Qty: 12 EACH | Refills: 3 | Status: SHIPPED
Start: 2024-03-18 | End: 2024-04-02 | Stop reason: SDUPTHER

## 2024-03-18 ASSESSMENT — ENCOUNTER SYMPTOMS
VISUAL CHANGE: 1
FATIGUE: 1
WEAKNESS: 1
POLYDIPSIA: 1

## 2024-03-18 NOTE — PROGRESS NOTES
Patient is sent at the request of Kelsey Lombardi,* for my opinion regarding Type 2 diabetes.  My final recommendations will be communicated back to the requesting provider by way of shared medical record.    Subjective     Diabetes  She presents for her follow-up diabetic visit. She has type 2 diabetes mellitus. The initial diagnosis of diabetes was made 1 year ago. Her disease course has been worsening. There are no hypoglycemic associated symptoms. Associated symptoms include fatigue, polydipsia, polyuria, visual change and weakness. There are no hypoglycemic complications. Symptoms are worsening. There are no diabetic complications. Risk factors for coronary artery disease include diabetes mellitus, dyslipidemia and hypertension (on steroid therapy). Current diabetic treatment includes oral agent (dual therapy). She is compliant with treatment all of the time. She is following a generally healthy diet. She rarely participates in exercise. Her overall blood glucose range is >200 mg/dl. An ACE inhibitor/angiotensin II receptor blocker is not being taken. She does not see a podiatrist.Eye exam is current.     At last pharmacy encounter her Lantus was increased up to 35 units once daily from 20 units once daily. Today we are following-up to discuss how this has affected her blood sugars and go over her CGM report.     If blood sugars are running higher into the 500s she will hold off on taking her Prednisone for a few hours until it comes down.     Patient has continued to have a cold and congestion that has been lingering since the beginning of March. She will reach out to Dr. Elder's office to see if she needs treatment for an infection.     Past Medical History:  She has a past medical history of Headache, unspecified, Personal history of diseases of the blood and blood-forming organs and certain disorders involving the immune mechanism, and Personal history of pneumonia (recurrent).    Past Surgical  History:  She has a past surgical history that includes Other surgical history (11/03/2022); Other surgical history (11/03/2022); Other surgical history (11/03/2022); Other surgical history (11/03/2022); Other surgical history (11/03/2022); Other surgical history (11/03/2022); Other surgical history (11/03/2022); and BI stereotactic guided breast right localization and biopsy (Right, 12/18/2023).    Social History:  She reports that she has never smoked. She has never used smokeless tobacco. She reports that she does not currently use alcohol. She reports that she does not use drugs.    Family History:  No family history on file.    Allergies:  Meloxicam, Salsalate, Glimepiride, Statins-hmg-coa reductase inhibitors, Adhesive, and Latex    Current diet:   Breakfast: 1 piece of multi grain whole wheat bread with organic peanut butter and a little cinnamon; 1/2 cup of coffee with cream (states it is plain but not flavored) or black tea with cinnamon   States she has to take bread with her prednisone to avoid stomach upset   Lunch: if at home she will have a protein with vegetable (hamburger with zucchini, green beans, and spinach today), or a protein drink (if she's working) - 5 grams of carbs per protein drink   Dinner: tweaks what she makes the rest of her household, will use low-carb wraps with protein and vegetables (grilled chicken)   Snacks: nuts, beef sticks throughout the day   Fluids: water with lemon, occasionally with crystal light; does not use artificial sweeteners   States she avoids processed foods    Current exercise:   States she has lost 20 pounds over the past 3 weeks unintentionally   No resistance or strength training  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 or will walk around the store     The patient does have a known family history of diabetes (father, mother)     Patient is using: glucometer and continuous glucose monitor  Full 2-week report attached to this visit         Hypoglycemia  "frequency: none  Hypoglycemia awareness: Yes     Adverse Effects: none reported     Objective     Diabetes Pharmacotherapy:  Metformin  mg once daily in the evening with a meal  Insulin glargine (Lantus Solostar) 35 units daily at bedtime     Historical Diabetes Pharmacotherapy:   Glimepiride 1 mg (patient reported headache, stomach upset, and generally not feeling good while on it and wanted to stop it)    Primary/Secondary Prevention   Statin? No  ACE-I/ARB? No  Aspirin? No    Pertinent PMH Review:  PMH of Pancreatitis: No  PMH of Retinopathy: No  PMH of Urinary Tract Infections: No  PMH of MTC: No  PMH of MEN 2: No     Lab Review  Lab Results   Component Value Date    BILITOT 0.6 03/16/2024    CALCIUM 10.3 03/16/2024    CO2 29 03/16/2024    CL 99 03/16/2024    CREATININE 0.63 03/16/2024    GLUCOSE 197 (H) 03/16/2024    ALKPHOS 78 03/16/2024    K 4.5 03/16/2024    PROT 6.1 (L) 03/16/2024     03/16/2024    AST 12 03/16/2024    ALT 28 03/16/2024    BUN 16 03/16/2024    ANIONGAP 15 03/16/2024    MG 2.09 07/25/2022    ALBUMIN 3.7 03/16/2024    GFRF >90 05/22/2023     Lab Results   Component Value Date    TRIG 147 03/16/2024    CHOL 223 (H) 03/16/2024    LDLCALC 128 (H) 03/16/2024    HDL 65.7 03/16/2024     Lab Results   Component Value Date    HGBA1C 7.8 (H) 12/29/2023    HGBA1C 6.6 (A) 05/22/2023    HGBA1C 7.0 (A) 07/25/2022     No components found for: \"UACR\"  The 10-year ASCVD risk score (Nathan MIRAMONTES, et al., 2019) is: 4.8%    Values used to calculate the score:      Age: 63 years      Sex: Female      Is Non- : No      Diabetic: Yes      Tobacco smoker: No      Systolic Blood Pressure: 107 mmHg      Is BP treated: No      HDL Cholesterol: 65.7 mg/dL      Total Cholesterol: 223 mg/dL    Health Maintenance:   Foot Exam: None, patient states that Dr. Elder did look at her feet in-office recently   Eye Exam: 02/20/24  Lipid Panel:  mg/dL,  mg/dL 05/22/23  Urine Albumin: " None   Influenza Immunization: does not get the flu shot   Pneumonia Immunization: none     Drug Interactions:  No significant drug-drug interactions exist requiring adjustment to medication therapy.     Assessment/Plan   Problem List Items Addressed This Visit       Type 2 diabetes mellitus without complication, without long-term current use of insulin (CMS/[facility]) - Primary     Kimberley Murillo has uncontrolled type 2 diabetes mellitus complicated by steroid use and hypertension as evidenced by her most recent A1c of 7.8% on 12/29/23 which had increased from 6.6% on 05/22/23. Her time in range over the past 4 weeks has been 4% which has increased from 0% and time >250 mg/dL has been 82% which has decreased from 91%. She increased her Lantus up to 35 units once daily on 03/12 and her blood sugars continue to be significantly elevated, however, I am able to better see peaks now that they have slightly come down but still remain significantly above range.   At this time I think it would be beneficial for patient to start short-acting insulin to help cover her blood sugars after taking her steroid dose. She takes her first dose around 9:30-10 AM and her blood sugar increases up to peak afterwards. She also will take her second dose of prednisone around 10 PM and will peak a little later after that dose. She may need mealtime short-acting insulin too, however, for now we'll just have her take it with the prednisone dose to see if it helps.   CHANGE:   Insulin glargine (Lantus Solostar) 100 unit/mL 45 units under the skin once daily (increased from 35 units once daily)  START: Insulin lispro (Humalog Kwikpen) 100 unit/mL 5 units under the skin twice daily with morning and evening prednisone doses   Prescription was not sending through escript - Verbal Rx given to [facility]  CONTINUE: Metformin  mg 1 tablet by mouth once daily in the evening with a meal  The DME is still working on patient's Dexcom supplies to  "see if she can get coverage through her medical benefits. I left a message yesterday looking for an update and am still waiting on a return call back.   We will plan to follow-up next in ~2 weeks to go over her loni report at that time after the insulin changes made today. Patient was encouraged to reach out to myself if she has any questions, issues, and/or concerns prior to next telephone appointment.          Relevant Medications    insulin lispro (HumaLOG KwikPen Insulin) 100 unit/mL injection    pen needle, diabetic 32 gauge x 5/32\" needle    insulin glargine (Lantus Solostar U-100 Insulin) 100 unit/mL (3 mL) pen    Other Relevant Orders    Follow Up In [facility] - Pharmacy     Other Visit Diagnoses       Hyperglycemia        Relevant Medications    insulin glargine (Lantus Solostar U-100 Insulin) 100 unit/mL (3 mL) pen          Pharmacy Follow-Up: 04/02/24   Physician Follow-Up: 03/25/24    Steven Gilbert, PharmD     Continue all meds under the continuation of care with the referring provider and clinical pharmacy team.    "

## 2024-03-18 NOTE — PROGRESS NOTES
Actemra 162mg autoinjector rx approved, per insurance pt [facility]. Updated Actemra rx sent to [facility]. Will discontinue Actemra infusion orders for now. Patient is aware.    See handout provided

## 2024-03-19 ENCOUNTER — TELEPHONE (OUTPATIENT)
Dept: PRIMARY CARE | Facility: CLINIC | Age: 64
End: 2024-03-19

## 2024-03-19 ENCOUNTER — TELEMEDICINE (OUTPATIENT)
Dept: PHARMACY | Facility: HOSPITAL | Age: 64
End: 2024-03-19
Payer: COMMERCIAL

## 2024-03-19 DIAGNOSIS — R73.9 HYPERGLYCEMIA: ICD-10-CM

## 2024-03-19 DIAGNOSIS — E11.9 TYPE 2 DIABETES MELLITUS WITHOUT COMPLICATION, WITHOUT LONG-TERM CURRENT USE OF INSULIN (MULTI): Primary | ICD-10-CM

## 2024-03-19 RX ORDER — INSULIN LISPRO 100 [IU]/ML
5 INJECTION, SOLUTION INTRAVENOUS; SUBCUTANEOUS 2 TIMES DAILY
Qty: 15 ML | Refills: 1 | Status: SHIPPED | OUTPATIENT
Start: 2024-03-19 | End: 2024-04-02 | Stop reason: SDUPTHER

## 2024-03-19 RX ORDER — INSULIN GLARGINE 100 [IU]/ML
45 INJECTION, SOLUTION SUBCUTANEOUS NIGHTLY
Qty: 15 ML | Refills: 1
Start: 2024-03-19 | End: 2024-04-02 | Stop reason: SDUPTHER

## 2024-03-19 RX ORDER — AMOXICILLIN AND CLAVULANATE POTASSIUM 875; 125 MG/1; MG/1
875 TABLET, FILM COATED ORAL 2 TIMES DAILY
Qty: 14 TABLET | Refills: 0 | Status: SHIPPED | OUTPATIENT
Start: 2024-03-19 | End: 2024-03-26

## 2024-03-19 RX ORDER — INSULIN LISPRO 100 [IU]/ML
5 INJECTION, SOLUTION INTRAVENOUS; SUBCUTANEOUS 2 TIMES DAILY
Qty: 15 ML | Refills: 1 | Status: SHIPPED | OUTPATIENT
Start: 2024-03-19 | End: 2024-03-19 | Stop reason: SDUPTHER

## 2024-03-19 RX ORDER — PEN NEEDLE, DIABETIC 30 GX3/16"
NEEDLE, DISPOSABLE MISCELLANEOUS
Qty: 100 EACH | Refills: 1 | Status: SHIPPED | OUTPATIENT
Start: 2024-03-19 | End: 2024-04-30 | Stop reason: SDUPTHER

## 2024-03-19 RX ORDER — PEN NEEDLE, DIABETIC 30 GX3/16"
NEEDLE, DISPOSABLE MISCELLANEOUS
Qty: 100 EACH | Refills: 1 | Status: SHIPPED | OUTPATIENT
Start: 2024-03-19 | End: 2024-03-19 | Stop reason: SDUPTHER

## 2024-03-19 RX ORDER — INSULIN GLARGINE 100 [IU]/ML
40 INJECTION, SOLUTION SUBCUTANEOUS NIGHTLY
Qty: 15 ML | Refills: 1
Start: 2024-03-19 | End: 2024-03-19 | Stop reason: SDUPTHER

## 2024-03-19 NOTE — ASSESSMENT & PLAN NOTE
Kimberley Murillo has uncontrolled type 2 diabetes mellitus complicated by steroid use and hypertension as evidenced by her most recent A1c of 7.8% on 12/29/23 which had increased from 6.6% on 05/22/23. Her time in range over the past 4 weeks has been 4% which has increased from 0% and time >250 mg/dL has been 82% which has decreased from 91%. She increased her Lantus up to 35 units once daily on 03/12 and her blood sugars continue to be significantly elevated, however, I am able to better see peaks now that they have slightly come down but still remain significantly above range.   At this time I think it would be beneficial for patient to start short-acting insulin to help cover her blood sugars after taking her steroid dose. She takes her first dose around 9:30-10 AM and her blood sugar increases up to peak afterwards. She also will take her second dose of prednisone around 10 PM and will peak a little later after that dose. She may need mealtime short-acting insulin too, however, for now we'll just have her take it with the prednisone dose to see if it helps.   CHANGE:   Insulin glargine (Lantus Solostar) 100 unit/mL 45 units under the skin once daily (increased from 35 units once daily)  START: Insulin lispro (Humalog Kwikpen) 100 unit/mL 5 units under the skin twice daily with morning and evening prednisone doses   Prescription was not sending through escript - Verbal Rx given to [facility]  CONTINUE: Metformin  mg 1 tablet by mouth once daily in the evening with a meal  The DME is still working on patient's Dexcom supplies to see if she can get coverage through her medical benefits. I left a message yesterday looking for an update and am still waiting on a return call back.   We will plan to follow-up next in ~2 weeks to go over her loni report at that time after the insulin changes made today. Patient was encouraged to reach out to myself if she has any questions, issues, and/or concerns prior to  next telephone appointment.

## 2024-03-19 NOTE — TELEPHONE ENCOUNTER
Pt called complaining of having sinus infection for 3wks. Pt is on a lot of prednisone. Pt stated is neg for covid. Pt had covid beginning of march 1st.  Pt didn't know if she could call antibiotic in?    Pharmacy=  [facility] in [place]  Phone [phone]    Please call pt at  [phone]

## 2024-03-19 NOTE — TELEPHONE ENCOUNTER
Discussed.  Has bad sinus congestion with green drainage.  Symptoms have been present for 3 weeks . Recommend Augmentin 875 mg twice daily for 7 days.  Recommend align probiotic to minimize risk of diarrhea.  She will call back if not feeling better.  Dr. Elder

## 2024-03-25 ENCOUNTER — OFFICE VISIT (OUTPATIENT)
Dept: RHEUMATOLOGY | Facility: CLINIC | Age: 64
End: 2024-03-25
Payer: COMMERCIAL

## 2024-03-25 VITALS
WEIGHT: 173.2 LBS | SYSTOLIC BLOOD PRESSURE: 102 MMHG | HEART RATE: 102 BPM | HEIGHT: 64 IN | OXYGEN SATURATION: 96 % | DIASTOLIC BLOOD PRESSURE: 86 MMHG | BODY MASS INDEX: 29.57 KG/M2 | TEMPERATURE: 97.8 F

## 2024-03-25 DIAGNOSIS — Z79.899 HIGH RISK MEDICATION USE: ICD-10-CM

## 2024-03-25 DIAGNOSIS — M31.6 GIANT CELL ARTERITIS (MULTI): Primary | ICD-10-CM

## 2024-03-25 DIAGNOSIS — Z79.52 CURRENT CHRONIC USE OF SYSTEMIC STEROIDS: ICD-10-CM

## 2024-03-25 PROCEDURE — 99214 OFFICE O/P EST MOD 30 MIN: CPT | Performed by: INTERNAL MEDICINE

## 2024-03-25 PROCEDURE — 3074F SYST BP LT 130 MM HG: CPT | Performed by: INTERNAL MEDICINE

## 2024-03-25 PROCEDURE — 3079F DIAST BP 80-89 MM HG: CPT | Performed by: INTERNAL MEDICINE

## 2024-03-25 PROCEDURE — 3049F LDL-C 100-129 MG/DL: CPT | Performed by: INTERNAL MEDICINE

## 2024-03-25 PROCEDURE — 1036F TOBACCO NON-USER: CPT | Performed by: INTERNAL MEDICINE

## 2024-03-25 NOTE — PROGRESS NOTES
Subjective   Patient ID: Kimberley Murillo is a 63 y.o. female who presents for Follow-up regarding GCA and large vessel vasculitis.    HPI 63-year-old female here for follow-up regarding GCA and large vessel vasculitis.  Her  is also present.     The patient relates that she had surgery for a left hip labral tear September 2022.  She had tripped and fallen on black pavement.  She states that her hip feels more stable after the surgery.     Her  notes that she seemed to develop more joint pain following the surgery.  She complains of some pain in her shoulders, neck, also fingers and toes..  Her shoulders, neck and hips hurt first thing in the morning.  She is stiff for 30 to 45 minutes in the morning.     The patient also complains of significant fatigue which started summer 2023.  She denies unintentional weight loss, fevers or night sweats.     In December 2023,  she started having difficulty with her right hand going numb and her fingers turning white when she is doing activities with her hands overhead, such as washing her hair.  She also notes that her arms got fatigued very quickly in this position, and she developed pain in her right proximal arm and right forearm.  She was having difficulty since December 2023 doing activities such as carrying a pot of spaghetti, as she notes that her right arm started hurting in her arms fatigue easily.  Her  thinks that she has lost muscle mass in her proximal arms.  She notes that her fingers cramp if she is trying to use her right hand.  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     The patient has a history of migraine headaches for several years.  She was to get a migraine every 2 weeks for several years.  Over the last several months, she gets 1-2 migraines per week.  She describes her migraines as starting in her neck, radiate over the entire head.  The headaches are associated with  photophobia.  She gets nausea but no vomiting.  She uses Excedrin Migraine for the headaches.     Dr. Tesfaye saw the patient January 25, 2024 for evaluation of upper extremity arterial insufficiency.  Physical exam noted by Dr. Tesfaye 1/25/24   included nonpalpable right brachial, right radial, and right ulnar pulses.  She was noted to have a weak left brachial and radial pulse.  She had palpable femoral and popliteal pulses that seemed normal.     CTA of chest 1/27/24  shows multifocal disease within the right subclavian and axillary artery with diffuse fusiform narrowing of the distal subclavian artery with proximal occlusion of the axillary artery.  The appearance of the multifocal narrowing and vessel thickening raises the question of a nonatherosclerotic vasculitis/arteritis.  She also has mild to moderate left subclavian and axillary artery disease with smooth fusiform narrowing of the distal subclavian and proximal axillary artery.    She had bilateral temporal artery biopsies done February 5, 2024 with concerns for giant cell arteritis.  The biopsies did not show evidence of vasculitis.    Prednisone 30 mg twice daily was started 2/02/24.  Unfortunately her blood sugar has been quite elevated.  She is known to be diabetic but was not previously on any medication.  Hemoglobin A1c was 7.8 December 2023-she was trying to manage her blood sugar with diet alone and had been doing better prior to starting prednisone.    Blood sugar started running as high as 500-600 after starting prednisone.  She did start metformin  mg with dinner 2/12/24.  She tried Amaryl but got severe dyspepsia from this.    She is now on metformin  mg with dinner, Lantus insulin 45 units at nighttime, Humalog KwikPen 5 units twice daily with prednisone.  Blood sugar is sometimes still running as high as 400.    Reportedly Actemra was approved (162 mg subcutaneous weekly), but has not yet been delivered to her home.  Prednisone was  tapered to 30 mg in morning and 20 mg in evening March 7, 2024.    Her right arm overall feels somewhat better with some improvement of the arm claudication.  However, she has noticed that she is getting some cramping in her hand and forearm when she is using her right hand (such as trying to cook).    I spoke to her last week, at which time she related symptoms of sinusitis that she had had for 3 weeks.  Symptoms include bad sinus congestion with green drainage.  She started Augmentin 875 mg twice daily March 19, 2024.    She now complains of numbness around her lips, which started the day after she started the Augmentin.  Her sinus symptoms have subsided.    Labs 7/22: RF 37, ESR 21  Labs 11/22: JASSI negative, JASSI panel negative, ESR 13, CRP 1.7 (normal less than 1), CCP negative,   Labs 5/23: Hemoglobin A1c 6.6, cholesterol 211, HDL 56, , triglycerides 107, TSH 1.5, BMP normal except glucose 123  Labs 7/23: CBC normal, UA normal, 25-hydroxy vitamin D 22  Labs 12/23: Hemoglobin A1c 7.8, CMP normal except glucose 134   Labs 1/31/24: ESR 39 (0-30), CRP 6.11 (less than 1), JASSI negative, rheumatoid factor 18 (0-15), CCP negative, IgG4 normal, ANCA negative, CBC with differential normal  Labs March 2024: ESR 23, CRP 0.94, CBC normal except white blood cell count 13.6, CMP normal except glucose 197, cholesterol 223, HDL 65, , triglycerides 147     CXR 12/22: Heart is not enlarged, lungs are clear.  Multilevel degenerative changes noted of the thoracic spine.     Cardiac calcium score August 2020: 0     Medical problem list:  Type 2 diabetes-currently managed with metformin 500 mg twice daily . Hemoglobin A1c was 7.8 December 2023.  Hypothyroidism  Hypertension  Giant cell arteritis with large vessel vasculitis     Medication: Reviewed as documented  Allergies: Reviewed as documented     Surgeries:   Right breast biopsy  Cholecystectomy   Tonsillectomy  Thyroidectomy- for goiter  Lithotripsy  Left hip labral  "tear repair     Social history: .  Denies use of tobacco. Denies use of alcohol.  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     Family history:  Father- passed away from CAD, RA  Mother- diabetes, CAD  Paternal grandmother- RA    ROS:  General: Denies fevers or chills.  CV: Denies chest pain or palpitations.  Denies leg edema.  Lungs: Denies coughing or shortness of breath.  Skin: Denies rashes or nodules.  MS: Right arm claudication has improved, but she has had some cramping in the right hand and forearm over the last few days when doing things such as cooking.    Objective   Pulse 102   Temp 36.6 °C (97.8 °F)   Ht 1.626 m (5' 4\")   Wt 78.6 kg (173 lb 3.2 oz)   SpO2 96%   BMI 29.73 kg/m²   Blood pressure was 102/86 in her left arm.  Blood pressure could not be heard in her right arm.    Physical Exam  HEENT: PERRL, EOMI. S/p bilateral temporary biopsies.  Neck: Supple, no nodes.  CV: RRR, no MGR. no bruits heard over neck or upper chest.  Lungs: Clear, no rales or wheezes.  Abdomen: Soft, nontender. No hepatosplenomegaly.  Extremities:  No cyanosis, clubbing, or edema.  MS: No synovitis.  Skin: No rashes or nodules.  Pulses: Radial and ulnar pulses are absent in the right upper extremity.  She has a weak left radial and ulnar pulse.  Hands are warm with good capillary refill.    Assessment/Plan   Problem List Items Addressed This Visit             ICD-10-CM    Giant cell arteritis (CMS/HCC) - Primary M31.6    Relevant Orders    Comprehensive metabolic panel    CBC and Auto Differential    Sedimentation Rate    C-reactive protein    Lipid panel     Other Visit Diagnoses         Codes    High risk medication use     Z79.899    Relevant Orders    Comprehensive metabolic panel    CBC and Auto Differential    Sedimentation Rate    C-reactive protein    Lipid panel    Current chronic use of systemic steroids     Z79.52    Relevant Orders    XR DEXA bone density "            GCA-has large vessel involvement without cranial arteritis (had negative bilateral temporary biopsies).  Presented with complaints of joint aching involving shoulders, neck, and  hips (less so hands and feet) since 9/22. (Likely PMR)  Complains of significant fatigue with 30 to 45 minutes of morning stiffness since summer 2023.  Also had right arm claudication since 12/23.  Initial labs remarkable for ESR of 39 and CRP 6.11 (normal less than 1).    CT angio chest with and without contrast done January 26, 2024 shows multifocal narrowing and vessel thickening in the right subclavian artery, including a long segment circumferential narrowing measuring 1.5 cm (with 75% narrowing).  There is abrupt narrowing and occlusion of the proximal right axillary artery.  There is mild to moderate narrowing of the distal left subclavian artery and mild to moderate narrowing of the proximal axillary artery on the left .  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     She started prednisone 30 mg twice daily February 2, 2024 with some improvement of her arm claudication.  Prednisone was tapered to 30 mg in morning and 20 mg in evening March 7, 2024.  We can now hear a blood pressure in her left arm, still cannot obtain blood pressure in her right arm.  She has a weak left radial pulse, does not have a palpable right radial pulse.    Actemra 162 mg subcutaneous weekly was reportedly approved, but she has not yet received the medication.    Unfortunately glucoses are still running quite elevated.  She did not tolerate glimepiride 1 mg daily, which caused severe dyspepsia.  She is tolerating metformin  mg with dinner.  She is also on Lantus insulin 45 units at bedtime, as well as Humalog 5 mg twice daily with her prednisone.     Plan:   Reduce prednisone to 2 tabs in AM and 2 tabs in PM. (40 mg daily)  Presumptive taper will be as follows:  April 8: Prednisone 30 mg daily  April 22: Prednisone 25 mg  daily  May 6: Prednisone 20 mg daily.  Baseline bone density ordered.  Consider add a bisphosphonate.  Stop Augmentin as sinusitis has improved.  Please contact me if you do not get your Actemra deliver in the next week.  Follow up in 1 month.

## 2024-03-25 NOTE — PATIENT INSTRUCTIONS
Reduce prednisone to 2 tabs in AM and 2 tabs in PM. (40 mg daily)  Presumptive taper will be as follows:  April 8: Prednisone 30 mg daily  April 22: Prednisone 25 mg daily  May 6: Prednisone 20 mg daily.  Baseline bone density ordered.  Consider add a bisphosphonate.  Stop Augmentin as sinusitis has improved.  Please contact me if you do not get your Actemra deliver in the next week.  Follow up in 1 month.

## 2024-03-26 ENCOUNTER — TELEPHONE (OUTPATIENT)
Dept: PRIMARY CARE | Facility: CLINIC | Age: 64
End: 2024-03-26
Payer: COMMERCIAL

## 2024-03-26 NOTE — TELEPHONE ENCOUNTER
----- Message from Kelsey Lombardi MD sent at 3/25/2024  7:41 PM EDT -----  Please call.  I would like her to get a baseline bone density test, as steroids increased risk of osteoporosis and bone fractures.  Order is entered in the EMR.  She can call [phone] to schedule.  Bone densities are done in radiology.  She should avoid taking calcium supplements or vitamin D for 24 hours before the test.  Dr. Elder

## 2024-03-26 NOTE — TELEPHONE ENCOUNTER
Called patient and advised, she will get it done at a closest location to her.   non-tender/non-distended

## 2024-04-02 ENCOUNTER — TELEMEDICINE (OUTPATIENT)
Dept: PHARMACY | Facility: HOSPITAL | Age: 64
End: 2024-04-02
Payer: COMMERCIAL

## 2024-04-02 ENCOUNTER — TELEPHONE (OUTPATIENT)
Dept: PRIMARY CARE | Facility: CLINIC | Age: 64
End: 2024-04-02

## 2024-04-02 DIAGNOSIS — R73.9 HYPERGLYCEMIA: ICD-10-CM

## 2024-04-02 DIAGNOSIS — M31.6 GIANT CELL ARTERITIS (MULTI): ICD-10-CM

## 2024-04-02 DIAGNOSIS — E11.9 TYPE 2 DIABETES MELLITUS WITHOUT COMPLICATION, WITHOUT LONG-TERM CURRENT USE OF INSULIN (MULTI): Primary | ICD-10-CM

## 2024-04-02 RX ORDER — INSULIN LISPRO 100 [IU]/ML
INJECTION, SOLUTION INTRAVENOUS; SUBCUTANEOUS
Qty: 15 ML | Refills: 1
Start: 2024-04-02 | End: 2024-05-02 | Stop reason: SDUPTHER

## 2024-04-02 RX ORDER — INSULIN GLARGINE 100 [IU]/ML
50 INJECTION, SOLUTION SUBCUTANEOUS NIGHTLY
Qty: 15 ML | Refills: 1
Start: 2024-04-02 | End: 2024-04-09 | Stop reason: SDUPTHER

## 2024-04-02 ASSESSMENT — ENCOUNTER SYMPTOMS
WEAKNESS: 1
BLURRED VISION: 1
FATIGUE: 1
POLYDIPSIA: 1
VISUAL CHANGE: 1

## 2024-04-02 NOTE — ASSESSMENT & PLAN NOTE
Kimberley Murillo has uncontrolled type 2 diabetes mellitus complicated by steroid use and hypertension as evidenced by her most recent A1c of 7.8% on 12/29/23 which had increased from 6.6% on 05/22/23. Her time in range over the past 2 weeks has bene 8% which has increased from 4% at our last encounter. She is currently using Lantus 45 units once daily in the evening and Humalog 5 units twice daily with prednisone doses.   Based on her Chava report over the past 4 weeks, her fasting blood sugars have come down and are just slightly out of range on CGM (patient reports they are typically in the 190s-220s). Her post-meal and post-steroid dose peaks are when her blood sugars are increasing up into the 300s. She does report she has not been in the 400s since starting the rapid acting insulin. We discussed adding additional rapid acting insulin to help cover meals in-addition to the prednisone doses.   CHANGE:   Insulin glargine (Lantus Solostar) 100 unit/mL 50 units under the skin once daily (increased from 45 units once daily to help continue to work on lowering her fasting readings)  Insulin lispro (Humalog KwikPen) 100 unit/mL 10 units under the skin with breakfast and morning prednisone dose, 5 units under the skin with dinner, and 5 units under the skin with evening prednisone dose  CONTINUE: Metformin  mg 1 tablet by mouth twice daily in the evening with a meal  Patient did inquire about using Trulicity as her mother used to use it. I explained to patient that since her uncontrolled readings are due to her steroid use that insulin is the best option at this time to help lower her blood sugars more quickly. I did explain to her that as her steroid dose comes down and her blood sugars stabilize more we can discuss potentially adding on additional therapies to reduce her insulin requirements at a later time depending on how her blood sugars change as her steroid dose is reduced.   We will plan to follow-up next  week to go over her loni report and see how her blood sugars have improved since increasing her rapid acting insulin and adding it with meals and slightly increasing her basal insulin. She was encouraged to reach out to myself if she has any questions, issues, and/or concerns prior to our next telephone appointment.

## 2024-04-02 NOTE — TELEPHONE ENCOUNTER
Pt stated pharmacy is asking for diagnostic code and release for actemra - can call [phone] (stated they also had faxed request)     Also pt wanted Dr Elder to know that she has fallen twice, no injuries though.

## 2024-04-02 NOTE — PROGRESS NOTES
Patient is sent at the request of Kelsey Lombardi,Emile for my opinion regarding Type 2 diabetes.  My final recommendations will be communicated back to the requesting provider by way of shared medical record.    Subjective     Diabetes  She presents for her follow-up diabetic visit. She has type 2 diabetes mellitus. The initial diagnosis of diabetes was made 1 year ago. Her disease course has been improving. There are no hypoglycemic associated symptoms. Associated symptoms include blurred vision, fatigue, polydipsia, polyuria, visual change and weakness. There are no hypoglycemic complications. Symptoms are worsening. There are no diabetic complications. Risk factors for coronary artery disease include diabetes mellitus, dyslipidemia and hypertension (on steroid therapy). Current diabetic treatment includes oral agent (dual therapy). She is compliant with treatment all of the time. She is following a generally healthy diet. She rarely participates in exercise. Her breakfast blood glucose range is generally 180-200 mg/dl. Her lunch blood glucose range is generally >200 mg/dl. Her dinner blood glucose range is generally >200 mg/dl. Her bedtime blood glucose range is generally >200 mg/dl. Her overall blood glucose range is >200 mg/dl. An ACE inhibitor/angiotensin II receptor blocker is not being taken. She does not see a podiatrist.Eye exam is current.     At last pharmacy encounter we added on rapid acting insulin (Humalog) 5 units twice a day with her prednisone doses and increased her Lantus up to 45 units at night.     She saw Dr. Elder on 03/25/24 and her Prednisone was decreased to 20 mg twice daily (40 mg daily) with the plan to decrease down to 30 mg daily on 04/08/24, 25 mg daily on 04/22/24, and then down to 20 mg daily on 05/06/24. We will continue to follow-up to monitor her blood sugars and adjust insulin as her dose is reduced.     Does note that her vision has been blurry which has worsened since  her blood sugars have worsened.     Patient does note that she experienced muscle weakness on Sunday and that she fell, states her family was able to catch her and bring her down to her knees. This is the first time this has happened and she reports that she did not hit her head and was not injured.     She has a bone density test scheduled for this Thursday so she does not currently have a Chava sensor on. She will check her blood sugar using her glucometer, lancets, and test strips until after her scan.     Past Medical History:  She has a past medical history of Headache, unspecified, Personal history of diseases of the blood and blood-forming organs and certain disorders involving the immune mechanism, and Personal history of pneumonia (recurrent).    Past Surgical History:  She has a past surgical history that includes Other surgical history (11/03/2022); Other surgical history (11/03/2022); Other surgical history (11/03/2022); Other surgical history (11/03/2022); Other surgical history (11/03/2022); Other surgical history (11/03/2022); Other surgical history (11/03/2022); and BI stereotactic guided breast right localization and biopsy (Right, 12/18/2023).    Social History:  She reports that she has never smoked. She has never used smokeless tobacco. She reports that she does not currently use alcohol. She reports that she does not use drugs.    Family History:  No family history on file.    Allergies:  Meloxicam, Salsalate, Glimepiride, Statins-hmg-coa reductase inhibitors, Adhesive, and Latex    Current diet:   Breakfast: 1 piece of multi grain whole wheat bread with organic peanut butter and a little cinnamon; 1/2 cup of coffee with cream (states it is plain but not flavored) or black tea with cinnamon   States she has to take bread with her prednisone to avoid stomach upset   Lunch: if at home she will have a protein with vegetable (hamburger with zucchini, green beans, and spinach today), or a protein  drink (if she's working) - 5 grams of carbs per protein drink   Dinner: tweaks what she makes the rest of her household, will use low-carb wraps with protein and vegetables (grilled chicken)   Snacks: nuts, beef sticks throughout the day   Fluids: water with lemon, occasionally with crystal light; does not use artificial sweeteners   States she avoids processed foods  Keeps protein shake on her if she is not eating a lot throughout the day     Current exercise:   States she has lost 20 pounds over the past 3 weeks unintentionally   No resistance or strength training  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 or will walk around the store     The patient does have a known family history of diabetes (father, mother)     Patient is using: glucometer and continuous glucose monitor  Full 2-week report attached to this visit   Blood sugars have improved from last pharmacy encounter, likely due in part both to increased insulin doses and decreased prednisone dose  Time in range increased from 4% to 8% over the past 2 weeks   Fasting typically 190-220s now per patient   Has not been over 400 since she started the rapid acting insulin, is typically now in the 300s in the afternoon and evening         Hypoglycemia frequency: none  Hypoglycemia awareness: Yes     Adverse Effects: none reported     Objective     Diabetes Pharmacotherapy:  Metformin  mg once daily in the evening with a meal  Insulin glargine (Lantus Solostar) 45 units daily at bedtime   Insulin lispro (Humalog Kwikpen) 5 units twice daily with morning and evening prednisone doses     Historical Diabetes Pharmacotherapy:   Glimepiride 1 mg (patient reported headache, stomach upset, and generally not feeling good while on it and wanted to stop it)    Primary/Secondary Prevention   Statin? No  ACE-I/ARB? No  Aspirin? No    Pertinent PMH Review:  PMH of Pancreatitis: No  PMH of Retinopathy: No  PMH of Urinary Tract Infections: No  PMH of MTC: No  PMH of MEN 2:  "No     Lab Review  Lab Results   Component Value Date    BILITOT 0.6 03/16/2024    CALCIUM 10.3 03/16/2024    CO2 29 03/16/2024    CL 99 03/16/2024    CREATININE 0.63 03/16/2024    GLUCOSE 197 (H) 03/16/2024    ALKPHOS 78 03/16/2024    K 4.5 03/16/2024    PROT 6.1 (L) 03/16/2024     03/16/2024    AST 12 03/16/2024    ALT 28 03/16/2024    BUN 16 03/16/2024    ANIONGAP 15 03/16/2024    MG 2.09 07/25/2022    ALBUMIN 3.7 03/16/2024    GFRF >90 05/22/2023     Lab Results   Component Value Date    TRIG 147 03/16/2024    CHOL 223 (H) 03/16/2024    LDLCALC 128 (H) 03/16/2024    HDL 65.7 03/16/2024     Lab Results   Component Value Date    HGBA1C 7.8 (H) 12/29/2023    HGBA1C 6.6 (A) 05/22/2023    HGBA1C 7.0 (A) 07/25/2022     No components found for: \"UACR\"  The 10-year ASCVD risk score (Nathan MIRAMONTES, et al., 2019) is: 4.4%    Values used to calculate the score:      Age: 63 years      Sex: Female      Is Non- : No      Diabetic: Yes      Tobacco smoker: No      Systolic Blood Pressure: 102 mmHg      Is BP treated: No      HDL Cholesterol: 65.7 mg/dL      Total Cholesterol: 223 mg/dL    Health Maintenance:   Foot Exam: None, patient states that Dr. Elder did look at her feet in-office recently   Eye Exam: 02/20/24  Lipid Panel:  mg/dL,  mg/dL 05/22/23  Urine Albumin: None   Influenza Immunization: does not get the flu shot   Pneumonia Immunization: none     Drug Interactions:  No significant drug-drug interactions exist requiring adjustment to medication therapy.     Assessment/Plan   Problem List Items Addressed This Visit       Type 2 diabetes mellitus without complication, without long-term current use of insulin (CMS/[facility]) - Primary     Kimberley Murillo has uncontrolled type 2 diabetes mellitus complicated by steroid use and hypertension as evidenced by her most recent A1c of 7.8% on 12/29/23 which had increased from 6.6% on 05/22/23. Her time in range over the past 2 weeks has bene " 8% which has increased from 4% at our last encounter. She is currently using Lantus 45 units once daily in the evening and Humalog 5 units twice daily with prednisone doses.   Based on her Chava report over the past 4 weeks, her fasting blood sugars have come down and are just slightly out of range on CGM (patient reports they are typically in the 190s-220s). Her post-meal and post-steroid dose peaks are when her blood sugars are increasing up into the 300s. She does report she has not been in the 400s since starting the rapid acting insulin. We discussed adding additional rapid acting insulin to help cover meals in-addition to the prednisone doses.   CHANGE:   Insulin glargine (Lantus Solostar) 100 unit/mL 50 units under the skin once daily (increased from 45 units once daily to help continue to work on lowering her fasting readings)  Insulin lispro (Humalog KwikPen) 100 unit/mL 10 units under the skin with breakfast and morning prednisone dose, 5 units under the skin with dinner, and 5 units under the skin with evening prednisone dose  CONTINUE: Metformin  mg 1 tablet by mouth twice daily in the evening with a meal  Patient did inquire about using Trulicity as her mother used to use it. I explained to patient that since her uncontrolled readings are due to her steroid use that insulin is the best option at this time to help lower her blood sugars more quickly. I did explain to her that as her steroid dose comes down and her blood sugars stabilize more we can discuss potentially adding on additional therapies to reduce her insulin requirements at a later time depending on how her blood sugars change as her steroid dose is reduced.   We will plan to follow-up next week to go over her chava report and see how her blood sugars have improved since increasing her rapid acting insulin and adding it with meals and slightly increasing her basal insulin. She was encouraged to reach out to myself if she has any  questions, issues, and/or concerns prior to our next telephone appointment.           Relevant Medications    insulin glargine (Lantus Solostar U-100 Insulin) 100 unit/mL (3 mL) pen    insulin lispro (HumaLOG KwikPen Insulin) 100 unit/mL injection    Other Relevant Orders    Follow Up In [facility] - Pharmacy     Other Visit Diagnoses       Hyperglycemia        Relevant Medications    insulin glargine (Lantus Solostar U-100 Insulin) 100 unit/mL (3 mL) pen          Pharmacy Follow-Up: 04/09/24  Physician Follow-Up: 04/23/24    Steven Gilbert PharmD     Continue all meds under the continuation of care with the referring provider and clinical pharmacy team.

## 2024-04-04 ENCOUNTER — HOSPITAL ENCOUNTER (OUTPATIENT)
Dept: RADIOLOGY | Facility: CLINIC | Age: 64
Discharge: HOME | End: 2024-04-04
Payer: COMMERCIAL

## 2024-04-04 DIAGNOSIS — Z79.52 CURRENT CHRONIC USE OF SYSTEMIC STEROIDS: ICD-10-CM

## 2024-04-04 PROCEDURE — 77080 DXA BONE DENSITY AXIAL: CPT

## 2024-04-04 PROCEDURE — 77080 DXA BONE DENSITY AXIAL: CPT | Performed by: STUDENT IN AN ORGANIZED HEALTH CARE EDUCATION/TRAINING PROGRAM

## 2024-04-06 DIAGNOSIS — M85.80 OSTEOPENIA, UNSPECIFIED LOCATION: Primary | ICD-10-CM

## 2024-04-06 DIAGNOSIS — M31.6 GIANT CELL ARTERITIS (MULTI): ICD-10-CM

## 2024-04-06 RX ORDER — PREDNISONE 10 MG/1
TABLET ORAL
Qty: 180 TABLET | Refills: 1 | Status: SHIPPED | OUTPATIENT
Start: 2024-04-06 | End: 2024-04-15

## 2024-04-06 RX ORDER — ALENDRONATE SODIUM 70 MG/1
70 TABLET ORAL
Qty: 4 TABLET | Refills: 11 | Status: SHIPPED | OUTPATIENT
Start: 2024-04-06 | End: 2024-04-30 | Stop reason: SINTOL

## 2024-04-06 RX ORDER — TOCILIZUMAB 180 MG/ML
162 INJECTION, SOLUTION SUBCUTANEOUS
Qty: 12 EACH | Refills: 3 | Status: SHIPPED | OUTPATIENT
Start: 2024-04-06

## 2024-04-08 ASSESSMENT — ENCOUNTER SYMPTOMS
BLURRED VISION: 1
VISUAL CHANGE: 1
FATIGUE: 1
POLYDIPSIA: 1
WEAKNESS: 1

## 2024-04-08 NOTE — PROGRESS NOTES
Patient is sent at the request of Kelsey Lombardi,* for my opinion regarding Type 2 diabetes.  My final recommendations will be communicated back to the requesting provider by way of shared medical record.    Subjective     Diabetes  She presents for her follow-up diabetic visit. She has type 2 diabetes mellitus. The initial diagnosis of diabetes was made 1 year ago. Her disease course has been improving. There are no hypoglycemic associated symptoms. Associated symptoms include blurred vision, fatigue, polydipsia, polyuria, visual change and weakness. There are no hypoglycemic complications. Symptoms are worsening. There are no diabetic complications. Risk factors for coronary artery disease include diabetes mellitus, dyslipidemia and hypertension (on steroid therapy). Current diabetic treatment includes oral agent (dual therapy). She is compliant with treatment all of the time. She is following a generally healthy diet. She rarely participates in exercise. Her breakfast blood glucose range is generally 180-200 mg/dl. Her lunch blood glucose range is generally >200 mg/dl. Her dinner blood glucose range is generally >200 mg/dl. Her bedtime blood glucose range is generally >200 mg/dl. Her overall blood glucose range is >200 mg/dl. An ACE inhibitor/angiotensin II receptor blocker is not being taken. She does not see a podiatrist.Eye exam is current.     At last pharmacy encounter her rapid acting insulin was increased to 10 units with breakfast and prednisone dose, 5 units with dinner, and 5 units with evening prednisone dose from just taking 5 units with each dose of prednisone. Her Lantus was also increased up to 50 units once daily. Today we are continuing to follow-up to titrate up her insulin to help with blood sugars and to watch for hypoglycemia as her prednisone dose is titrated down.     She saw Dr. Elder on 03/25/24 and her Prednisone was decreased to 20 mg twice daily (40 mg daily) with the plan to  decrease down to 30 mg daily on 04/08/24, 25 mg daily on 04/22/24, and then down to 20 mg daily on 05/06/24. We will continue to follow-up to monitor her blood sugars and adjust insulin as her dose is reduced.     Does note that her vision has been blurry which has worsened since her blood sugars have worsened. Notes improvement in her other symptoms of hyerglycemia.     Patient did have an episode of hypoglycemia yesterday morning, states this was likely due to giving her rapid acting insulin and waiting an hour to eat afterwards trying to space out food from her alendronate. She states that this morning her blood sugars were better.     Past Medical History:  She has a past medical history of Headache, unspecified, Personal history of diseases of the blood and blood-forming organs and certain disorders involving the immune mechanism, and Personal history of pneumonia (recurrent).    Past Surgical History:  She has a past surgical history that includes Other surgical history (11/03/2022); Other surgical history (11/03/2022); Other surgical history (11/03/2022); Other surgical history (11/03/2022); Other surgical history (11/03/2022); Other surgical history (11/03/2022); Other surgical history (11/03/2022); and BI stereotactic guided breast right localization and biopsy (Right, 12/18/2023).    Social History:  She reports that she has never smoked. She has never used smokeless tobacco. She reports that she does not currently use alcohol. She reports that she does not use drugs.    Family History:  No family history on file.    Allergies:  Meloxicam, Salsalate, Glimepiride, Statins-hmg-coa reductase inhibitors, Adhesive, and Latex    Current diet:   Breakfast: 1 piece of multi grain whole wheat bread with organic peanut butter and a little cinnamon; 1/2 cup of coffee with cream (states it is plain but not flavored) or black tea with cinnamon   States she has to take bread with her prednisone to avoid stomach upset    Lunch: if at home she will have a protein with vegetable (hamburger with zucchini, green beans, and spinach today), or a protein drink (if she's working) - 5 grams of carbs per protein drink   Dinner: tweaks what she makes the rest of her household, will use low-carb wraps with protein and vegetables (grilled chicken)   Snacks: nuts, beef sticks throughout the day   Fluids: water with lemon, occasionally with crystal light; does not use artificial sweeteners   States she avoids processed foods  Keeps protein shake on her if she is not eating a lot throughout the day     Current exercise:   States she has lost 20 pounds over the past 3 weeks unintentionally   No resistance or strength training  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 or will walk around the store     The patient does have a known family history of diabetes (father, mother)     Patient is using: glucometer and continuous glucose monitor  Full 1-week report attached to this encounter  Blood sugars have improved from last pharmacy encounter, likely due in part both to increased insulin doses and decreased prednisone dose  Time in range increased from 8% to 24% over the past 7 days  Patient had a bone scan done on 04/04/24 so she did not wear a sensor for a few days leading up to the scan   Patient did have an episode of hypoglycemia yesterday morning    Was 133 mg/dL this morning but previous days fasting has been higher   States that she is rarely in the 300s anymore and is in the upper 200s at her highest          Hypoglycemia frequency: 1% - 1 episode yesterday morning   Hypoglycemia awareness: Yes     Adverse Effects: none reported     Objective     Diabetes Pharmacotherapy:  Metformin  mg once daily in the evening with a meal  Insulin glargine (Lantus Solostar) 50 units daily at bedtime   Insulin lispro (Humalog Kwikpen) 10 units with breakfast and morning prednisone dose, 5 units with dinner, and 5 units with evening prednisone  "dose    Historical Diabetes Pharmacotherapy:   Glimepiride 1 mg (patient reported headache, stomach upset, and generally not feeling good while on it and wanted to stop it)    Primary/Secondary Prevention   Statin? No  ACE-I/ARB? No  Aspirin? No    Pertinent PMH Review:  PMH of Pancreatitis: No  PMH of Retinopathy: No  PMH of Urinary Tract Infections: No  PMH of MTC: No  PMH of MEN 2: No     Lab Review  Lab Results   Component Value Date    BILITOT 0.6 03/16/2024    CALCIUM 10.3 03/16/2024    CO2 29 03/16/2024    CL 99 03/16/2024    CREATININE 0.63 03/16/2024    GLUCOSE 197 (H) 03/16/2024    ALKPHOS 78 03/16/2024    K 4.5 03/16/2024    PROT 6.1 (L) 03/16/2024     03/16/2024    AST 12 03/16/2024    ALT 28 03/16/2024    BUN 16 03/16/2024    ANIONGAP 15 03/16/2024    MG 2.09 07/25/2022    ALBUMIN 3.7 03/16/2024    GFRF >90 05/22/2023     Lab Results   Component Value Date    TRIG 147 03/16/2024    CHOL 223 (H) 03/16/2024    LDLCALC 128 (H) 03/16/2024    HDL 65.7 03/16/2024     Lab Results   Component Value Date    HGBA1C 7.8 (H) 12/29/2023    HGBA1C 6.6 (A) 05/22/2023    HGBA1C 7.0 (A) 07/25/2022     No components found for: \"UACR\"  The 10-year ASCVD risk score (Nathan MIRAMONTES, et al., 2019) is: 4.4%    Values used to calculate the score:      Age: 63 years      Sex: Female      Is Non- : No      Diabetic: Yes      Tobacco smoker: No      Systolic Blood Pressure: 102 mmHg      Is BP treated: No      HDL Cholesterol: 65.7 mg/dL      Total Cholesterol: 223 mg/dL    Health Maintenance:   Foot Exam: None, patient states that Dr. Elder did look at her feet in-office recently   Eye Exam: 02/20/24  Lipid Panel:  mg/dL,  mg/dL 05/22/23  Urine Albumin: None   Influenza Immunization: does not get the flu shot   Pneumonia Immunization: none     Drug Interactions:  No significant drug-drug interactions exist requiring adjustment to medication therapy.     Assessment/Plan   Problem List Items " Addressed This Visit       Type 2 diabetes mellitus without complication, without long-term current use of insulin (CMS/[facility])     Kimberley Murillo has uncontrolled type 2 diabetes mellitus complicated by steroid use and hypertension as evidenced by her most recent A1c of 7.8% on 12/29/23 which had increased from 6.6% on 05/22/23. Her time in range over the past 7 days has been 24% which has increased from 8% over the 14 days prior to our last pharmacy encounter. She has tolerated the increase in her rapid acting and basal insulin well, she did have 1 episode of hypoglycemia yesterday morning but this was due to her taking her rapid acting insulin and then waiting an hour to eat.   Patient's blood sugars are continuing to come down and she has made significant improvement over the past week. She did decrease her prednisone dose down to 30 mg daily starting yesterday 04/08/24.   She noted that she has had some stomach upset in the evenings and is unsure if it is being caused by a medication. She asked about moving her Metformin to take with lunch instead of with her other medications in the evening. I told patient this would be alright and should give her an idea if the metformin is causing any stomach upset.   CHANGE:   Insulin glargine (Lantus Solostar) 100 unit/mL 55 units under the skin once daily (increased from 50 unites once daily)  Metformin  mg 1 tablet by mouth once daily with lunch (changed to take with lunch instead of dinner)  CONTINUE: Insulin lispro (Humalog KwikPen) 100 unit/mL 10 units under the skin with breakfast and morning prednisone dose, 5 units under the skin with dinner, and 5 units under the skin with evening prednisone dose  If patient tolerates Metformin well moving it to lunch time, since her blood sugars have come down closer to range, we can discuss titrating up her Metformin dose instead of increasing her insulin dose further at next follow-up.   We will plan to follow-up in ~1 week  to see how patient has tolerated the increase in Lantus dose and to see how she has tolerated Metformin with lunch time. Patient was encouraged to reach out with any questions/issues/concerns prior to our next telephone appointment.          Relevant Medications    insulin glargine (Lantus Solostar U-100 Insulin) 100 unit/mL (3 mL) pen    Other Relevant Orders    Follow Up In [facility] - Pharmacy     Other Visit Diagnoses       Hyperglycemia        Relevant Medications    insulin glargine (Lantus Solostar U-100 Insulin) 100 unit/mL (3 mL) pen    Other Relevant Orders    Follow Up In [facility] - Pharmacy          Pharmacy Follow-Up: 04/16/24   Physician Follow-Up: 04/23/24    Obdulia DhillonD     Continue all meds under the continuation of care with the referring provider and clinical pharmacy team.

## 2024-04-09 ENCOUNTER — TELEMEDICINE (OUTPATIENT)
Dept: PHARMACY | Facility: HOSPITAL | Age: 64
End: 2024-04-09
Payer: COMMERCIAL

## 2024-04-09 DIAGNOSIS — E11.9 TYPE 2 DIABETES MELLITUS WITHOUT COMPLICATION, WITHOUT LONG-TERM CURRENT USE OF INSULIN (MULTI): ICD-10-CM

## 2024-04-09 DIAGNOSIS — R73.9 HYPERGLYCEMIA: ICD-10-CM

## 2024-04-09 RX ORDER — INSULIN GLARGINE 100 [IU]/ML
55 INJECTION, SOLUTION SUBCUTANEOUS NIGHTLY
Qty: 15 ML | Refills: 1
Start: 2024-04-09 | End: 2024-04-19 | Stop reason: SDUPTHER

## 2024-04-09 NOTE — ASSESSMENT & PLAN NOTE
Kimberley Murillo has uncontrolled type 2 diabetes mellitus complicated by steroid use and hypertension as evidenced by her most recent A1c of 7.8% on 12/29/23 which had increased from 6.6% on 05/22/23. Her time in range over the past 7 days has been 24% which has increased from 8% over the 14 days prior to our last pharmacy encounter. She has tolerated the increase in her rapid acting and basal insulin well, she did have 1 episode of hypoglycemia yesterday morning but this was due to her taking her rapid acting insulin and then waiting an hour to eat.   Patient's blood sugars are continuing to come down and she has made significant improvement over the past week. She did decrease her prednisone dose down to 30 mg daily starting yesterday 04/08/24.   She noted that she has had some stomach upset in the evenings and is unsure if it is being caused by a medication. She asked about moving her Metformin to take with lunch instead of with her other medications in the evening. I told patient this would be alright and should give her an idea if the metformin is causing any stomach upset.   CHANGE:   Insulin glargine (Lantus Solostar) 100 unit/mL 55 units under the skin once daily (increased from 50 unites once daily)  Metformin  mg 1 tablet by mouth once daily with lunch (changed to take with lunch instead of dinner)  CONTINUE: Insulin lispro (Humalog KwikPen) 100 unit/mL 10 units under the skin with breakfast and morning prednisone dose, 5 units under the skin with dinner, and 5 units under the skin with evening prednisone dose  If patient tolerates Metformin well moving it to lunch time, since her blood sugars have come down closer to range, we can discuss titrating up her Metformin dose instead of increasing her insulin dose further at next follow-up.   We will plan to follow-up in ~1 week to see how patient has tolerated the increase in Lantus dose and to see how she has tolerated Metformin with lunch time. Patient  was encouraged to reach out with any questions/issues/concerns prior to our next telephone appointment.

## 2024-04-12 ENCOUNTER — APPOINTMENT (OUTPATIENT)
Dept: RADIOLOGY | Facility: CLINIC | Age: 64
End: 2024-04-12
Payer: COMMERCIAL

## 2024-04-13 DIAGNOSIS — M31.6 GIANT CELL ARTERITIS (MULTI): ICD-10-CM

## 2024-04-15 RX ORDER — PREDNISONE 10 MG/1
TABLET ORAL
Qty: 90 TABLET | Refills: 3 | Status: SHIPPED | OUTPATIENT
Start: 2024-04-15

## 2024-04-16 ENCOUNTER — TELEMEDICINE (OUTPATIENT)
Dept: PHARMACY | Facility: HOSPITAL | Age: 64
End: 2024-04-16
Payer: COMMERCIAL

## 2024-04-16 ENCOUNTER — TELEPHONE (OUTPATIENT)
Dept: PRIMARY CARE | Facility: CLINIC | Age: 64
End: 2024-04-16

## 2024-04-16 DIAGNOSIS — E11.9 TYPE 2 DIABETES MELLITUS WITHOUT COMPLICATION, WITHOUT LONG-TERM CURRENT USE OF INSULIN (MULTI): Primary | ICD-10-CM

## 2024-04-16 DIAGNOSIS — R73.9 HYPERGLYCEMIA: ICD-10-CM

## 2024-04-16 DIAGNOSIS — M31.6 GIANT CELL ARTERITIS (MULTI): Primary | ICD-10-CM

## 2024-04-16 RX ORDER — METFORMIN HYDROCHLORIDE 500 MG/1
TABLET, EXTENDED RELEASE ORAL
Qty: 90 TABLET | Refills: 1 | Status: SHIPPED | OUTPATIENT
Start: 2024-04-16 | End: 2024-06-04

## 2024-04-16 ASSESSMENT — ENCOUNTER SYMPTOMS
WEAKNESS: 1
FATIGUE: 1
POLYDIPSIA: 1
BLURRED VISION: 1
VISUAL CHANGE: 1

## 2024-04-16 NOTE — ASSESSMENT & PLAN NOTE
Kimberley Murillo has uncontrolled type 2 diabetes mellitus complicated by steroid use and hypertension as evidenced by her most recent A1c of 7.8% on 12/29/23 which had increased from 6.6% on 05/22/23. Her time in range over the past 7 days has been 32% which has increased from 24% over the 7 days prior to our last pharmacy encounter. She does report a painful burning sensation with her long acting insulin that is likely due to the volume of insulin she is injecting.   We discussed that higher concentrated insulin such as Tresiba 200 unit/mL would be a better option for her to tolerate the injection, however, if her dose is going to need reduced as her steroid dose decreases this would be harder to do with the more concentrated insulin. She will continue her current dose of Lantus and when we follow-up next we will see how her blood sugars have improved after reducing her steroid dose further. At that time if her blood sugars did not improve a lot we may opt to transition her to the Tresibal 200 unit/mL at that time.   Patient has tolerated Metformin better taking it with lunch and states she just has slight upset stomach. She is agreeable to trying to increase the dose instead of increasing her insulin dose at this time now that she has improved into a better range.   CHANGE: Metformin  mg 2 tablets by mouth in the morning and 1 tablet in the afternoon after meals  Patient will start by taking 1 tablet twice daily for 7 days and then if tolerated will increase to 2 tablets for one dose and 1 tablet for another dose for another week   CONTINUE:   Insulin glargine (Lantus Solostar) 100 unit/mL 55 units under the skin once daily   Insulin lispro (Humalog Kwikpen) 100 unit/mL 10 units under the skin with breakfast and morning prednisone dose, 5 units with dinner, and 5 units with evening prednisone dose  If patient continues to tolerate Metformin we will discuss increasing to 2 tablets twice daily at next  follow-up.   We will follow-up in ~2 weeks after patient has increased to 3 tablets of Metformin daily and after she has been on the reduced steroid dose for 1 week. She was encouraged to reach out with any questions/concerns prior to our next telephone appointment.

## 2024-04-16 NOTE — TELEPHONE ENCOUNTER
Pt wanted to know when she should get her blood work. She said she took her first shot on 4/13/24. She also has a follow up on 4/23/24. If it is too soon to get her blood work should she reschedule her appt? Please advise

## 2024-04-16 NOTE — PROGRESS NOTES
Patient is sent at the request of Kelsey Lombardi,* for my opinion regarding Type 2 diabetes.  My final recommendations will be communicated back to the requesting provider by way of shared medical record.    Subjective     Diabetes  She presents for her follow-up diabetic visit. She has type 2 diabetes mellitus. The initial diagnosis of diabetes was made 1 year ago. Her disease course has been improving. There are no hypoglycemic associated symptoms. Associated symptoms include blurred vision, fatigue, polydipsia, polyuria, visual change and weakness. There are no hypoglycemic complications. Symptoms are worsening. There are no diabetic complications. Risk factors for coronary artery disease include diabetes mellitus, dyslipidemia and hypertension (on steroid therapy). Current diabetic treatment includes oral agent (dual therapy). She is compliant with treatment all of the time. She is following a generally healthy diet. She rarely participates in exercise. Her breakfast blood glucose range is generally 180-200 mg/dl. Her lunch blood glucose range is generally >200 mg/dl. Her dinner blood glucose range is generally >200 mg/dl. Her bedtime blood glucose range is generally >200 mg/dl. Her overall blood glucose range is >200 mg/dl. An ACE inhibitor/angiotensin II receptor blocker is not being taken. She does not see a podiatrist.Eye exam is current.     At last pharmacy encounter her Lantus was increased up to 55 units daily and she changed taking her Metformin with lunch to separate it from other medications to see if it was causing stomach upset. Today we are following-up to see how patient has tolerated Metformin and to potentially work on increasing up the dose.     She saw Dr. Elder on 03/25/24 and her Prednisone was decreased to 20 mg twice daily (40 mg daily) with the plan to decrease down to 30 mg daily on 04/08/24, 25 mg daily on 04/22/24, and then down to 20 mg daily on 05/06/24. We will continue to  follow-up to monitor her blood sugars and adjust insulin as her dose is reduced.     Patient does note that when she switched taking the Metformin to lunch time she did still have some stomach upset but it did improve.     Notes a burning pain with her Lantus injection - dsicussed that this is likely due to the the volume of injection.     Noticed a high spike in blood sugar into the 500s and gave 10 units with dinner instead of fine. We discussed that this was ok as long as she is continuing to monitor afterwards to ensure her blood sugars are not dropping too low.     Past Medical History:  She has a past medical history of Headache, unspecified, Personal history of diseases of the blood and blood-forming organs and certain disorders involving the immune mechanism, and Personal history of pneumonia (recurrent).    Past Surgical History:  She has a past surgical history that includes Other surgical history (11/03/2022); Other surgical history (11/03/2022); Other surgical history (11/03/2022); Other surgical history (11/03/2022); Other surgical history (11/03/2022); Other surgical history (11/03/2022); Other surgical history (11/03/2022); and BI stereotactic guided breast right localization and biopsy (Right, 12/18/2023).    Social History:  She reports that she has never smoked. She has never used smokeless tobacco. She reports that she does not currently use alcohol. She reports that she does not use drugs.    Family History:  No family history on file.    Allergies:  Meloxicam, Salsalate, Glimepiride, Statins-hmg-coa reductase inhibitors, Adhesive, and Latex    Current diet:   Breakfast: 1 piece of multi grain whole wheat bread with organic peanut butter and a little cinnamon; 1/2 cup of coffee with cream (states it is plain but not flavored) or black tea with cinnamon   States she has to take bread with her prednisone to avoid stomach upset   Lunch: if at home she will have a protein with vegetable (hamburger  with zucchini, green beans, and spinach today), or a protein drink (if she's working) - 5 grams of carbs per protein drink   Dinner: tweaks what she makes the rest of her household, will use low-carb wraps with protein and vegetables (grilled chicken)   Snacks: nuts, beef sticks throughout the day   Fluids: water with lemon, occasionally with crystal light; does not use artificial sweeteners   States she avoids processed foods  Keeps protein shake on her if she is not eating a lot throughout the day     Current exercise:   States she has lost 20 pounds over the past 3 weeks unintentionally   No resistance or strength training  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 or will walk around the store     The patient does have a known family history of diabetes (father, mother)     Patient is using: glucometer and continuous glucose monitor  Full 1-week report attached to this encounter          Hypoglycemia frequency: 0%  Hypoglycemia awareness: Yes     Adverse Effects: none reported     Objective     Diabetes Pharmacotherapy:  Metformin  mg once daily in the evening with a meal  Insulin glargine (Lantus Solostar) 55 units daily at bedtime   Insulin lispro (Humalog Kwikpen) 10 units with breakfast and morning prednisone dose, 5 units with dinner, and 5 units with evening prednisone dose    Historical Diabetes Pharmacotherapy:   Glimepiride 1 mg (patient reported headache, stomach upset, and generally not feeling good while on it and wanted to stop it)    Primary/Secondary Prevention   Statin? No  ACE-I/ARB? No  Aspirin? No    Pertinent PMH Review:  PMH of Pancreatitis: No  PMH of Retinopathy: No  PMH of Urinary Tract Infections: No  PMH of MTC: No  PMH of MEN 2: No     Lab Review  Lab Results   Component Value Date    BILITOT 0.6 03/16/2024    CALCIUM 10.3 03/16/2024    CO2 29 03/16/2024    CL 99 03/16/2024    CREATININE 0.63 03/16/2024    GLUCOSE 197 (H) 03/16/2024    ALKPHOS 78 03/16/2024    K 4.5 03/16/2024     "PROT 6.1 (L) 03/16/2024     03/16/2024    AST 12 03/16/2024    ALT 28 03/16/2024    BUN 16 03/16/2024    ANIONGAP 15 03/16/2024    MG 2.09 07/25/2022    ALBUMIN 3.7 03/16/2024    GFRF >90 05/22/2023     Lab Results   Component Value Date    TRIG 147 03/16/2024    CHOL 223 (H) 03/16/2024    LDLCALC 128 (H) 03/16/2024    HDL 65.7 03/16/2024     Lab Results   Component Value Date    HGBA1C 7.8 (H) 12/29/2023    HGBA1C 6.6 (A) 05/22/2023    HGBA1C 7.0 (A) 07/25/2022     No components found for: \"UACR\"  The 10-year ASCVD risk score (Nathan MIRAMONTES, et al., 2019) is: 4.4%    Values used to calculate the score:      Age: 63 years      Sex: Female      Is Non- : No      Diabetic: Yes      Tobacco smoker: No      Systolic Blood Pressure: 102 mmHg      Is BP treated: No      HDL Cholesterol: 65.7 mg/dL      Total Cholesterol: 223 mg/dL    Health Maintenance:   Foot Exam: None, patient states that Dr. Elder did look at her feet in-office recently   Eye Exam: 02/20/24  Lipid Panel:  mg/dL,  mg/dL 05/22/23  Urine Albumin: None   Influenza Immunization: does not get the flu shot   Pneumonia Immunization: none     Drug Interactions:  No significant drug-drug interactions exist requiring adjustment to medication therapy.     Assessment/Plan   Problem List Items Addressed This Visit       Type 2 diabetes mellitus without complication, without long-term current use of insulin (Multi) - Primary     Kimberley Murillo has uncontrolled type 2 diabetes mellitus complicated by steroid use and hypertension as evidenced by her most recent A1c of 7.8% on 12/29/23 which had increased from 6.6% on 05/22/23. Her time in range over the past 7 days has been 32% which has increased from 24% over the 7 days prior to our last pharmacy encounter. She does report a painful burning sensation with her long acting insulin that is likely due to the volume of insulin she is injecting.   We discussed that higher " concentrated insulin such as Tresiba 200 unit/mL would be a better option for her to tolerate the injection, however, if her dose is going to need reduced as her steroid dose decreases this would be harder to do with the more concentrated insulin. She will continue her current dose of Lantus and when we follow-up next we will see how her blood sugars have improved after reducing her steroid dose further. At that time if her blood sugars did not improve a lot we may opt to transition her to the Tresibal 200 unit/mL at that time.   Patient has tolerated Metformin better taking it with lunch and states she just has slight upset stomach. She is agreeable to trying to increase the dose instead of increasing her insulin dose at this time now that she has improved into a better range.   CHANGE: Metformin  mg 2 tablets by mouth in the morning and 1 tablet in the afternoon after meals  Patient will start by taking 1 tablet twice daily for 7 days and then if tolerated will increase to 2 tablets for one dose and 1 tablet for another dose for another week   CONTINUE:   Insulin glargine (Lantus Solostar) 100 unit/mL 55 units under the skin once daily   Insulin lispro (Humalog Kwikpen) 100 unit/mL 10 units under the skin with breakfast and morning prednisone dose, 5 units with dinner, and 5 units with evening prednisone dose  If patient continues to tolerate Metformin we will discuss increasing to 2 tablets twice daily at next follow-up.   We will follow-up in ~2 weeks after patient has increased to 3 tablets of Metformin daily and after she has been on the reduced steroid dose for 1 week. She was encouraged to reach out with any questions/concerns prior to our next telephone appointment.          Relevant Medications    metFORMIN XR (Glucophage-XR) 500 mg 24 hr tablet    Other Relevant Orders    Follow Up In [facility] - Pharmacy     Other Visit Diagnoses       Hyperglycemia              Pharmacy Follow-Up:  04/30/24   Physician Follow-Up: 04/23/24    Steven Gilbert, PharmD     Continue all meds under the continuation of care with the referring provider and clinical pharmacy team.

## 2024-04-18 ENCOUNTER — LAB (OUTPATIENT)
Dept: LAB | Facility: LAB | Age: 64
End: 2024-04-18
Payer: COMMERCIAL

## 2024-04-18 DIAGNOSIS — M31.6 GIANT CELL ARTERITIS (MULTI): ICD-10-CM

## 2024-04-18 LAB
CRP SERPL-MCNC: <0.1 MG/DL
ERYTHROCYTE [SEDIMENTATION RATE] IN BLOOD BY WESTERGREN METHOD: 3 MM/H (ref 0–30)

## 2024-04-18 PROCEDURE — 86140 C-REACTIVE PROTEIN: CPT

## 2024-04-18 PROCEDURE — 85652 RBC SED RATE AUTOMATED: CPT

## 2024-04-18 PROCEDURE — 36415 COLL VENOUS BLD VENIPUNCTURE: CPT

## 2024-04-19 DIAGNOSIS — R73.9 HYPERGLYCEMIA: ICD-10-CM

## 2024-04-19 DIAGNOSIS — E11.9 TYPE 2 DIABETES MELLITUS WITHOUT COMPLICATION, WITHOUT LONG-TERM CURRENT USE OF INSULIN (MULTI): ICD-10-CM

## 2024-04-19 RX ORDER — INSULIN GLARGINE 100 [IU]/ML
55 INJECTION, SOLUTION SUBCUTANEOUS NIGHTLY
Qty: 15 ML | Refills: 1 | Status: SHIPPED | OUTPATIENT
Start: 2024-04-19 | End: 2024-05-22 | Stop reason: DRUGHIGH

## 2024-04-19 NOTE — PROGRESS NOTES
Patient reached out requesting refills on her Lantus as the pharmacy does not have any refills left on file

## 2024-04-22 ENCOUNTER — TELEPHONE (OUTPATIENT)
Dept: PRIMARY CARE | Facility: CLINIC | Age: 64
End: 2024-04-22
Payer: COMMERCIAL

## 2024-04-22 NOTE — TELEPHONE ENCOUNTER
Pt of Dr Elder's. Calling in regards to her Alendronate. She believes she is having a reaction to this. She took the medication this morning around 6am. After she had abd pain, back pain, and numbness in hands/legs/lips. Since then she has laid down and it has slowly subsided. Pt is aware to advise ER if things worsen and will also be in office for an appt tomorrow afternoon. Please advise. Thank you!

## 2024-04-22 NOTE — TELEPHONE ENCOUNTER
Disregard previous note. I met with patient.  She got epigastric discomfort several hours after taking the alendronate.  She feels like her abdomen was distended.  She has not tried take anything.  She said her feet were numb and swollen this morning but are feeling better.  She has some numbness on both sides of her lower lip-she said she had some of that previously.  She feels the symptoms are subsiding so she is going to come to the office tomorrow we will discuss it further.  Dr. Elder

## 2024-04-23 ENCOUNTER — OFFICE VISIT (OUTPATIENT)
Dept: RHEUMATOLOGY | Facility: CLINIC | Age: 64
End: 2024-04-23
Payer: COMMERCIAL

## 2024-04-23 VITALS
HEART RATE: 109 BPM | DIASTOLIC BLOOD PRESSURE: 80 MMHG | BODY MASS INDEX: 31.76 KG/M2 | WEIGHT: 186 LBS | TEMPERATURE: 97.5 F | OXYGEN SATURATION: 98 % | SYSTOLIC BLOOD PRESSURE: 90 MMHG | HEIGHT: 64 IN

## 2024-04-23 DIAGNOSIS — M31.6 GIANT CELL ARTERITIS (MULTI): Primary | ICD-10-CM

## 2024-04-23 DIAGNOSIS — E11.9 TYPE 2 DIABETES MELLITUS WITHOUT COMPLICATION, WITHOUT LONG-TERM CURRENT USE OF INSULIN (MULTI): ICD-10-CM

## 2024-04-23 PROCEDURE — 3008F BODY MASS INDEX DOCD: CPT | Performed by: INTERNAL MEDICINE

## 2024-04-23 PROCEDURE — 3079F DIAST BP 80-89 MM HG: CPT | Performed by: INTERNAL MEDICINE

## 2024-04-23 PROCEDURE — 3074F SYST BP LT 130 MM HG: CPT | Performed by: INTERNAL MEDICINE

## 2024-04-23 PROCEDURE — 99214 OFFICE O/P EST MOD 30 MIN: CPT | Performed by: INTERNAL MEDICINE

## 2024-04-23 PROCEDURE — 3049F LDL-C 100-129 MG/DL: CPT | Performed by: INTERNAL MEDICINE

## 2024-04-23 ASSESSMENT — PATIENT HEALTH QUESTIONNAIRE - PHQ9
1. LITTLE INTEREST OR PLEASURE IN DOING THINGS: NOT AT ALL
2. FEELING DOWN, DEPRESSED OR HOPELESS: NOT AT ALL
SUM OF ALL RESPONSES TO PHQ9 QUESTIONS 1 AND 2: 0

## 2024-04-23 NOTE — PATIENT INSTRUCTIONS
Presumptive prednisone taper will be as follows:  May 6: Prednisone 20 mg daily.  May 20: 15 mg daily.  Ivette 3: 12.5 mg daily.  June 17: 10 mg daily.  Repeat labs in 2 weeks: CBC with diff, CMP, ESR, CRP, lipids.  Follow up in 1 month.

## 2024-04-23 NOTE — PROGRESS NOTES
Subjective   Patient ID: Kimberley Murillo is a 63 y.o. female who presents for Follow-up regarding GCA and large vessel vasculitis.    HPI 63-year-old female here for follow-up regarding GCA and large vessel vasculitis.  Her  is also present.     The patient relates that she had surgery for a left hip labral tear September 2022.  She had tripped and fallen on black pavement.  She states that her hip feels more stable after the surgery.     Her  notes that she seemed to develop more joint pain following the surgery.  She complains of some pain in her shoulders, neck, also fingers and toes..  Her shoulders, neck and hips hurt first thing in the morning.  She is stiff for 30 to 45 minutes in the morning.     The patient also complains of significant fatigue which started summer 2023.  She denies unintentional weight loss, fevers or night sweats.     In December 2023,  she started having difficulty with her right hand going numb and her fingers turning white when she is doing activities with her hands overhead, such as washing her hair.  She also notes that her arms got fatigued very quickly in this position, and she developed pain in her right proximal arm and right forearm.  She was having difficulty since December 2023 doing activities such as carrying a pot of spaghetti, as she notes that her right arm started hurting in her arms fatigue easily.  Her  thinks that she has lost muscle mass in her proximal arms.  She notes that her fingers cramp if she is trying to use her right hand.  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     The patient has a history of migraine headaches for several years.  She was to get a migraine every 2 weeks for several years.  Over the last several months, she gets 1-2 migraines per week.  She describes her migraines as starting in her neck, radiate over the entire head.  The headaches are associated with  photophobia.  She gets nausea but no vomiting.  She uses Excedrin Migraine for the headaches.     Dr. Tesfaye saw the patient January 25, 2024 for evaluation of upper extremity arterial insufficiency.  Physical exam noted by Dr. Tesfaye 1/25/24   included nonpalpable right brachial, right radial, and right ulnar pulses.  She was noted to have a weak left brachial and radial pulse.  She had palpable femoral and popliteal pulses that seemed normal.     CTA of chest 1/27/24  shows multifocal disease within the right subclavian and axillary artery with diffuse fusiform narrowing of the distal subclavian artery with proximal occlusion of the axillary artery.  The appearance of the multifocal narrowing and vessel thickening raises the question of a nonatherosclerotic vasculitis/arteritis.  She also has mild to moderate left subclavian and axillary artery disease with smooth fusiform narrowing of the distal subclavian and proximal axillary artery.    She had bilateral temporal artery biopsies done February 5, 2024 with concerns for giant cell arteritis.  The biopsies did not show evidence of vasculitis.    Prednisone 30 mg twice daily was started 2/02/24.  Unfortunately her blood sugar has been quite elevated.  She is known to be diabetic but was not previously on any medication.  Hemoglobin A1c was 7.8 December 2023-she was trying to manage her blood sugar with diet alone and had been doing better prior to starting prednisone.    Blood sugar started running as high as 500-600 after starting prednisone.  She did start metformin  mg with dinner 2/12/24.  She tried Amaryl but got severe dyspepsia from this.    She is now on metformin  mg with dinner, Lantus insulin 55 units at nighttime, Humalog KwikPen 5 to 10 units with breakfast, 5 units with dinner, 5 units with evening prednisone dose.  Patient also got a Dexcom device.  Our pharmacist has been assisting with glucose management.  Blood sugar most of the time is  now in the 1 20-1 80 range.  It will peak at 250 sometimes.    Prednisone was reduced to 25 mg daily April 22, 2024.  She started Actemra 162 mg subcutaneous weekly-has had 2 injections so far.    She is still getting some arm claudication symptoms, has not yet noted much improvement.  She continues to deny headaches, jaw claudication.  She feels as though her glasses are not working as well-last eye exam was January 2024 (I suspect this is due to fluctuating blood sugar).    She did start alendronate 70 mg orally weekly because of the osteopenia (for prevention of glucocorticoid induced osteoporosis).  Unfortunately she is getting epigastric and periumbilical abdominal pain after taking alendronate.  This has happened after the last 3 doses, so I do not think she will be able to tolerate this.    Labs 7/22: RF 37, ESR 21  Labs 11/22: JASSI negative, JASSI panel negative, ESR 13, CRP 1.7 (normal less than 1), CCP negative,   Labs 5/23: Hemoglobin A1c 6.6, cholesterol 211, HDL 56, , triglycerides 107, TSH 1.5, BMP normal except glucose 123  Labs 7/23: CBC normal, UA normal, 25-hydroxy vitamin D 22  Labs 12/23: Hemoglobin A1c 7.8, CMP normal except glucose 134   Labs 1/31/24: ESR 39 (0-30), CRP 6.11 (less than 1), JASSI negative, rheumatoid factor 18 (0-15), CCP negative, IgG4 normal, ANCA negative, CBC with differential normal  Labs March 2024: ESR 23, CRP 0.94, CBC normal except white blood cell count 13.6, CMP normal except glucose 197, cholesterol 223, HDL 65, , triglycerides 147  Labs April 18, 2024: ESR 3, CRP less than 0.1 (less than 1)     CXR 12/22: Heart is not enlarged, lungs are clear.  Multilevel degenerative changes noted of the thoracic spine.     Cardiac calcium score August 2020: 0    DEXA April 2024: T-score -2.2 left femoral neck, T-score -1.5 left total hip, T-score normal lumbar spine.  Estimated 10-year fracture risk by FRAX is 3.1% for hip fracture and 17% for major osteoporotic  "fracture.     Medical problem list:  Type 2 diabetes-currently managed with metformin 500 mg twice daily . Hemoglobin A1c was 7.8 December 2023.  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  Denies use of tobacco. Denies use of alcohol.  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     Family history:  Father- passed away from CAD, RA  Mother- diabetes, CAD  Paternal grandmother- RA    ROS:  General: Denies fevers or chills.  CV: Denies chest pain or palpitations.  Denies leg edema.  Lungs: Denies coughing or shortness of breath.  Skin: Denies rashes or nodules.  MS: Still gets some arm claudication, especially right arm.  Her hands go numb when she holds an overhead.    Objective   Pulse 109   Temp 36.4 °C (97.5 °F)   Ht 1.626 m (5' 4\")   Wt 84.4 kg (186 lb)   SpO2 98%   BMI 31.93 kg/m²     Physical Exam  HEENT: PERRL, EOMI. S/p bilateral temporary biopsies.  Neck: Supple, no nodes.  CV: RRR, no MGR. no bruits heard over neck or upper chest.  Lungs: Clear, no rales or wheezes.  Abdomen: Soft, nontender. No hepatosplenomegaly.  Extremities:  No cyanosis, clubbing, or edema.  MS: No synovitis.  Skin: No rashes or nodules.  Pulses: Radial and ulnar pulses are absent in the right upper extremity.  She has a weak left radial and ulnar pulse.  Hands are warm with good capillary refill.    Assessment/Plan   Problem List Items Addressed This Visit             ICD-10-CM    Type 2 diabetes mellitus without complication, without long-term current use of insulin (Multi) E11.9    Giant cell arteritis (Multi) - Primary M31.6    BMI 31.0-31.9,adult Z68.31       GCA-has large vessel involvement without cranial " arteritis (had negative bilateral temporary biopsies).  Presented with complaints of joint aching involving shoulders, neck, and  hips (less so hands and feet) since 9/22. (Likely PMR)  Complains of significant fatigue with 30 to 45 minutes of morning stiffness since summer 2023.  Also had right arm claudication since 12/23.  Initial labs remarkable for ESR of 39 and CRP 6.11 (normal less than 1).    CT angio chest with and without contrast done January 26, 2024 shows multifocal narrowing and vessel thickening in the right subclavian artery, including a long segment circumferential narrowing measuring 1.5 cm (with 75% narrowing).  There is abrupt narrowing and occlusion of the proximal right axillary artery.  There is mild to moderate narrowing of the distal left subclavian artery and mild to moderate narrowing of the proximal axillary artery on the left .  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     She started prednisone 30 mg twice daily February 2, 2024 with some improvement of her arm claudication.  Prednisone was tapered to 30 mg in morning and 20 mg in evening March 7, 2024.  Blood pressure can be heard faintly in the left arm, where she has a weak radial pulse.  Blood pressure can still not be heard in the right arm and radial pulse cannot be palpated.    Actemra 162 mg subcutaneous weekly was started 2 weeks ago.    Diabetes-currently under better control with metformin  mg 1000 mg in morning and 5 mg in evening, Lantus 55 units at bedtime, Humalog KwikPen 10 units with breakfast, 5 units with dinner, 5 units with evening prednisone dose.    Osteopenia- unfortunately she is getting-abdominal pain after taking the alendronate (has happened after 3 doses).  I advised her to stop this for now.    Plan:  Presumptive prednisone taper will be as follows:  May 6: Prednisone 20 mg daily.  May 20: 15 mg daily.  Ivette 3: 12.5 mg daily.  June 17: 10 mg daily.  Repeat labs in 2 weeks: CBC with diff,  CMP, ESR, CRP, lipids.  Follow up in 1 month.

## 2024-04-25 ENCOUNTER — TELEPHONE (OUTPATIENT)
Dept: PRIMARY CARE | Facility: CLINIC | Age: 64
End: 2024-04-25
Payer: COMMERCIAL

## 2024-04-25 NOTE — TELEPHONE ENCOUNTER
----- Message from Kelsey Lombardi MD sent at 4/24/2024  8:01 PM EDT -----  Please fax copy of her office visit to her primary care physician.  Dr. Elder

## 2024-04-29 ASSESSMENT — ENCOUNTER SYMPTOMS
FATIGUE: 1
BLURRED VISION: 1
POLYDIPSIA: 1
WEAKNESS: 1
VISUAL CHANGE: 1

## 2024-04-29 NOTE — PROGRESS NOTES
Patient is sent at the request of Kelsey Lombardi,* for my opinion regarding Type 2 diabetes.  My final recommendations will be communicated back to the requesting provider by way of shared medical record.    Subjective     Diabetes  She presents for her follow-up diabetic visit. She has type 2 diabetes mellitus. The initial diagnosis of diabetes was made 1 year ago. Her disease course has been improving. There are no hypoglycemic associated symptoms. Associated symptoms include blurred vision, fatigue, polydipsia, polyuria, visual change and weakness. There are no hypoglycemic complications. Symptoms are worsening. There are no diabetic complications. Risk factors for coronary artery disease include diabetes mellitus, dyslipidemia and hypertension (on steroid therapy). Current diabetic treatment includes oral agent (monotherapy) and insulin injections. She is compliant with treatment all of the time. She is following a generally healthy diet. She rarely participates in exercise. Her overall blood glucose range is 140-180 mg/dl. An ACE inhibitor/angiotensin II receptor blocker is not being taken. She does not see a podiatrist.Eye exam is current.     At last pharmacy encounter her Metformin was increased to 500 mg 2 tablets in the morning and 1 tablet in the evening. Her insulin doses were left the same.     She saw Dr. Elder on 04/23/24 and her presumptive prednisone taper was planned:   May 6th- 20 mg daily   May 20th - 15 mg daily   June 3rd - 12.5 mg daily  June 17th - 10 mg daily     Today we are following up to go over her CGM data over the past 2 weeks and to discuss her insulin dosing moving forward.     Past Medical History:  She has a past medical history of Headache, unspecified, Personal history of diseases of the blood and blood-forming organs and certain disorders involving the immune mechanism, and Personal history of pneumonia (recurrent).    Past Surgical History:  She has a past surgical  history that includes Other surgical history (11/03/2022); Other surgical history (11/03/2022); Other surgical history (11/03/2022); Other surgical history (11/03/2022); Other surgical history (11/03/2022); Other surgical history (11/03/2022); Other surgical history (11/03/2022); and BI stereotactic guided breast right localization and biopsy (Right, 12/18/2023).    Social History:  She reports that she has never smoked. She has never used smokeless tobacco. She reports that she does not currently use alcohol. She reports that she does not use drugs.    Family History:  No family history on file.    Allergies:  Meloxicam, Salsalate, Glimepiride, Statins-hmg-coa reductase inhibitors, Adhesive, and Latex    Current diet:   Breakfast: 1 piece of multi grain whole wheat bread with organic peanut butter and a little cinnamon; 1/2 cup of coffee with cream (states it is plain but not flavored) or black tea with cinnamon   States she has to take bread with her prednisone to avoid stomach upset   Lunch: if at home she will have a protein with vegetable (hamburger with zucchini, green beans, and spinach today), or a protein drink (if she's working) - 5 grams of carbs per protein drink   Dinner: tweaks what she makes the rest of her household, will use low-carb wraps with protein and vegetables (grilled chicken)   Snacks: nuts, beef sticks throughout the day   Fluids: water with lemon, occasionally with crystal light; does not use artificial sweeteners   States she avoids processed foods  Keeps protein shake on her if she is not eating a lot throughout the day     Current exercise:   States she has lost 20 pounds over the past 3 weeks unintentionally   No resistance or strength training  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 or will walk around the store     The patient does have a known family history of diabetes (father, mother)     Patient is using: glucometer and continuous glucose monitor  Full 2-week report attached to  "this encounter  Much improved compared to report to last pharmacy encounter        Hypoglycemia frequency: 1%  Hypoglycemia awareness: Yes     Adverse Effects: none reported     Objective     Diabetes Pharmacotherapy:  Metformin  mg 1 tablet twice daily (states she took a 3rd tablet for the first time yesterday after dinner)  Insulin glargine (Lantus Solostar) 55 units daily at bedtime   Insulin lispro (Humalog Kwikpen) 10 units with breakfast and morning prednisone dose, 5 units with dinner, and 5 units with evening prednisone dose    Historical Diabetes Pharmacotherapy:   Glimepiride 1 mg (patient reported headache, stomach upset, and generally not feeling good while on it and wanted to stop it)    Primary/Secondary Prevention   Statin? No  ACE-I/ARB? No  Aspirin? No    Pertinent PMH Review:  PMH of Pancreatitis: No  PMH of Retinopathy: No  PMH of Urinary Tract Infections: No  PMH of MTC: No  PMH of MEN 2: No     Lab Review  Lab Results   Component Value Date    BILITOT 0.6 03/16/2024    CALCIUM 10.3 03/16/2024    CO2 29 03/16/2024    CL 99 03/16/2024    CREATININE 0.63 03/16/2024    GLUCOSE 197 (H) 03/16/2024    ALKPHOS 78 03/16/2024    K 4.5 03/16/2024    PROT 6.1 (L) 03/16/2024     03/16/2024    AST 12 03/16/2024    ALT 28 03/16/2024    BUN 16 03/16/2024    ANIONGAP 15 03/16/2024    MG 2.09 07/25/2022    ALBUMIN 3.7 03/16/2024    GFRF >90 05/22/2023     Lab Results   Component Value Date    TRIG 147 03/16/2024    CHOL 223 (H) 03/16/2024    LDLCALC 128 (H) 03/16/2024    HDL 65.7 03/16/2024     Lab Results   Component Value Date    HGBA1C 7.8 (H) 12/29/2023    HGBA1C 6.6 (A) 05/22/2023    HGBA1C 7.0 (A) 07/25/2022     No components found for: \"UACR\"  The 10-year ASCVD risk score (Nathan DK, et al., 2019) is: 3.4%    Values used to calculate the score:      Age: 63 years      Sex: Female      Is Non- : No      Diabetic: Yes      Tobacco smoker: No      Systolic Blood Pressure: " 90 mmHg      Is BP treated: No      HDL Cholesterol: 65.7 mg/dL      Total Cholesterol: 223 mg/dL    Health Maintenance:   Foot Exam: None, patient states that Dr. Elder did look at her feet in-office recently   Eye Exam: 02/20/24  Lipid Panel:  mg/dL,  mg/dL 03/16/24  Urine Albumin: None   Influenza Immunization: does not get the flu shot   Pneumonia Immunization: none     Drug Interactions:  No significant drug-drug interactions exist requiring adjustment to medication therapy.     Assessment/Plan   Problem List Items Addressed This Visit       Type 2 diabetes mellitus without complication, without long-term current use of insulin (Multi)     Kimberley Murillo has uncontrolled type 2 diabetes mellitus complicated by steroid use and hypertension as evidenced by her most recent A1c of 7.8% on 12/29/23 which had increased from 6.6% on 05/22/23. Her time in range over the past 14 days has been 64% which has increased significantly from 32% over the 7 days prior to our last pharmacy encounter. She reports that she has divided up her Lantus dose to give 1/2 the dose twice daily to avoid burning around the injection site.   Her blood sugars have reduced significantly since increasing her medication and the reduction in her prednisone dose. I anticipate we will be working on reducing her insulin doses moving forward as her prednisone is tapered down.   She took a 3rd tablet of prednisone for the first time yesterday after dinner and states that she had some stomach upset. She does feel that this may be related, however, to the fact that she took it after dinner and didn't have as much food in her stomach. I recommended she take 2 tablets with dinner to get the 3rd tablet in.   CHANGE:   Metformin  mg 1 tablet by mouth with lunch and 2 tablets with dinner  Lantus Solostar 100 unit/mL 40 units under the skin once daily (reduced from 55 units daily starting 05/06/24 with reduced prednisone dose)  CONTINUE:  "Humalog Kwikpen 100 unit/mL 10 units under the skin with breakfast and morning prednisone dose, 5 units with dinner, and 5 units with evening prednisone dose  Refills were submitted for patient's Chava 3 sensors and pen needles.   If patient tolerates 3 tablets of metformin daily well we will discuss increasing her up to 4 tablets daily at next follow-up appointment to help reduce insulin requirements in addition to work on titrating her insulin down while her prednisone dose is titrated down.   We will follow-up in 2 weeks. Patient was encouraged to reach out with any questions, issues, and/or concerns prior to our next follow-up.          Relevant Medications    pen needle, diabetic 32 gauge x 5/32\" needle    blood-glucose sensor (FreeStyle Chava 3 Sensor) device    Other Relevant Orders    Follow Up In [facility] Pharmacy     Pharmacy Follow-Up: 05/14/2024   Physician Follow-Up: 05/20/2024    Steven Gilbert, PharmD     Continue all meds under the continuation of care with the referring provider and clinical pharmacy team.    "

## 2024-04-30 ENCOUNTER — TELEMEDICINE (OUTPATIENT)
Dept: PHARMACY | Facility: HOSPITAL | Age: 64
End: 2024-04-30
Payer: COMMERCIAL

## 2024-04-30 DIAGNOSIS — E11.9 TYPE 2 DIABETES MELLITUS WITHOUT COMPLICATION, WITHOUT LONG-TERM CURRENT USE OF INSULIN (MULTI): ICD-10-CM

## 2024-04-30 RX ORDER — BLOOD-GLUCOSE SENSOR
EACH MISCELLANEOUS
Qty: 2 EACH | Refills: 2 | Status: SHIPPED | OUTPATIENT
Start: 2024-04-30

## 2024-04-30 RX ORDER — PEN NEEDLE, DIABETIC 30 GX3/16"
NEEDLE, DISPOSABLE MISCELLANEOUS
Qty: 200 EACH | Refills: 2 | Status: SHIPPED | OUTPATIENT
Start: 2024-04-30

## 2024-04-30 NOTE — ASSESSMENT & PLAN NOTE
Kimberley Murillo has uncontrolled type 2 diabetes mellitus complicated by steroid use and hypertension as evidenced by her most recent A1c of 7.8% on 12/29/23 which had increased from 6.6% on 05/22/23. Her time in range over the past 14 days has been 64% which has increased significantly from 32% over the 7 days prior to our last pharmacy encounter. She reports that she has divided up her Lantus dose to give 1/2 the dose twice daily to avoid burning around the injection site.   Her blood sugars have reduced significantly since increasing her medication and the reduction in her prednisone dose. I anticipate we will be working on reducing her insulin doses moving forward as her prednisone is tapered down.   She took a 3rd tablet of prednisone for the first time yesterday after dinner and states that she had some stomach upset. She does feel that this may be related, however, to the fact that she took it after dinner and didn't have as much food in her stomach. I recommended she take 2 tablets with dinner to get the 3rd tablet in.   CHANGE:   Metformin  mg 1 tablet by mouth with lunch and 2 tablets with dinner  Lantus Solostar 100 unit/mL 40 units under the skin once daily (reduced from 55 units daily starting 05/06/24 with reduced prednisone dose)  CONTINUE: Humalog Kwikpen 100 unit/mL 10 units under the skin with breakfast and morning prednisone dose, 5 units with dinner, and 5 units with evening prednisone dose  Refills were submitted for patient's Chava 3 sensors and pen needles.   If patient tolerates 3 tablets of metformin daily well we will discuss increasing her up to 4 tablets daily at next follow-up appointment to help reduce insulin requirements in addition to work on titrating her insulin down while her prednisone dose is titrated down.   We will follow-up in 2 weeks. Patient was encouraged to reach out with any questions, issues, and/or concerns prior to our next follow-up.

## 2024-05-02 DIAGNOSIS — E11.9 TYPE 2 DIABETES MELLITUS WITHOUT COMPLICATION, WITHOUT LONG-TERM CURRENT USE OF INSULIN (MULTI): ICD-10-CM

## 2024-05-02 RX ORDER — INSULIN LISPRO 100 [IU]/ML
INJECTION, SOLUTION INTRAVENOUS; SUBCUTANEOUS
Qty: 15 ML | Refills: 1 | Status: SHIPPED | OUTPATIENT
Start: 2024-05-02 | End: 2024-06-04

## 2024-05-14 ENCOUNTER — LAB (OUTPATIENT)
Dept: LAB | Facility: LAB | Age: 64
End: 2024-05-14
Payer: COMMERCIAL

## 2024-05-14 ENCOUNTER — TELEMEDICINE (OUTPATIENT)
Dept: PHARMACY | Facility: HOSPITAL | Age: 64
End: 2024-05-14
Payer: COMMERCIAL

## 2024-05-14 DIAGNOSIS — E11.9 TYPE 2 DIABETES MELLITUS WITHOUT COMPLICATION, WITHOUT LONG-TERM CURRENT USE OF INSULIN (MULTI): Primary | ICD-10-CM

## 2024-05-14 DIAGNOSIS — Z79.899 HIGH RISK MEDICATION USE: ICD-10-CM

## 2024-05-14 DIAGNOSIS — M31.6 GIANT CELL ARTERITIS (MULTI): ICD-10-CM

## 2024-05-14 LAB
ALBUMIN SERPL BCP-MCNC: 4 G/DL (ref 3.4–5)
ALP SERPL-CCNC: 45 U/L (ref 33–136)
ALT SERPL W P-5'-P-CCNC: 35 U/L (ref 7–45)
ANION GAP SERPL CALC-SCNC: 13 MMOL/L (ref 10–20)
AST SERPL W P-5'-P-CCNC: 17 U/L (ref 9–39)
BASOPHILS # BLD AUTO: 0.04 X10*3/UL (ref 0–0.1)
BASOPHILS NFR BLD AUTO: 0.5 %
BILIRUB SERPL-MCNC: 0.6 MG/DL (ref 0–1.2)
BUN SERPL-MCNC: 15 MG/DL (ref 6–23)
CALCIUM SERPL-MCNC: 10 MG/DL (ref 8.6–10.6)
CHLORIDE SERPL-SCNC: 104 MMOL/L (ref 98–107)
CHOLEST SERPL-MCNC: 256 MG/DL (ref 0–199)
CHOLESTEROL/HDL RATIO: 2.9
CO2 SERPL-SCNC: 30 MMOL/L (ref 21–32)
CREAT SERPL-MCNC: 0.65 MG/DL (ref 0.5–1.05)
CRP SERPL-MCNC: <0.1 MG/DL
EGFRCR SERPLBLD CKD-EPI 2021: >90 ML/MIN/1.73M*2
EOSINOPHIL # BLD AUTO: 0.03 X10*3/UL (ref 0–0.7)
EOSINOPHIL NFR BLD AUTO: 0.4 %
ERYTHROCYTE [DISTWIDTH] IN BLOOD BY AUTOMATED COUNT: 16.7 % (ref 11.5–14.5)
ERYTHROCYTE [SEDIMENTATION RATE] IN BLOOD BY WESTERGREN METHOD: 1 MM/H (ref 0–30)
GLUCOSE SERPL-MCNC: 48 MG/DL (ref 74–99)
HCT VFR BLD AUTO: 39.7 % (ref 36–46)
HDLC SERPL-MCNC: 88.9 MG/DL
HGB BLD-MCNC: 12.6 G/DL (ref 12–16)
IMM GRANULOCYTES # BLD AUTO: 0.12 X10*3/UL (ref 0–0.7)
IMM GRANULOCYTES NFR BLD AUTO: 1.5 % (ref 0–0.9)
LDLC SERPL CALC-MCNC: 140 MG/DL
LYMPHOCYTES # BLD AUTO: 1.41 X10*3/UL (ref 1.2–4.8)
LYMPHOCYTES NFR BLD AUTO: 17.8 %
MCH RBC QN AUTO: 30.1 PG (ref 26–34)
MCHC RBC AUTO-ENTMCNC: 31.7 G/DL (ref 32–36)
MCV RBC AUTO: 95 FL (ref 80–100)
MONOCYTES # BLD AUTO: 0.69 X10*3/UL (ref 0.1–1)
MONOCYTES NFR BLD AUTO: 8.7 %
NEUTROPHILS # BLD AUTO: 5.63 X10*3/UL (ref 1.2–7.7)
NEUTROPHILS NFR BLD AUTO: 71.1 %
NON HDL CHOLESTEROL: 167 MG/DL (ref 0–149)
NRBC BLD-RTO: 0.8 /100 WBCS (ref 0–0)
PLATELET # BLD AUTO: 209 X10*3/UL (ref 150–450)
POTASSIUM SERPL-SCNC: 3.8 MMOL/L (ref 3.5–5.3)
PROT SERPL-MCNC: 6.1 G/DL (ref 6.4–8.2)
RBC # BLD AUTO: 4.18 X10*6/UL (ref 4–5.2)
SODIUM SERPL-SCNC: 143 MMOL/L (ref 136–145)
TRIGL SERPL-MCNC: 135 MG/DL (ref 0–149)
VLDL: 27 MG/DL (ref 0–40)
WBC # BLD AUTO: 7.9 X10*3/UL (ref 4.4–11.3)

## 2024-05-14 PROCEDURE — 80061 LIPID PANEL: CPT

## 2024-05-14 PROCEDURE — 85025 COMPLETE CBC W/AUTO DIFF WBC: CPT

## 2024-05-14 PROCEDURE — 80053 COMPREHEN METABOLIC PANEL: CPT

## 2024-05-14 PROCEDURE — 86140 C-REACTIVE PROTEIN: CPT

## 2024-05-14 PROCEDURE — 36415 COLL VENOUS BLD VENIPUNCTURE: CPT

## 2024-05-14 PROCEDURE — 85652 RBC SED RATE AUTOMATED: CPT

## 2024-05-14 ASSESSMENT — ENCOUNTER SYMPTOMS
VISUAL CHANGE: 1
FATIGUE: 1
BLURRED VISION: 1
WEAKNESS: 1
POLYDIPSIA: 1

## 2024-05-14 NOTE — PROGRESS NOTES
Patient is sent at the request of Kelsey Lombardi,* for my opinion regarding Type 2 diabetes.  My final recommendations will be communicated back to the requesting provider by way of shared medical record.    Subjective     Diabetes  She presents for her follow-up diabetic visit. She has type 2 diabetes mellitus. The initial diagnosis of diabetes was made 1 year ago. Her disease course has been improving. There are no hypoglycemic associated symptoms. Associated symptoms include blurred vision, fatigue, polydipsia, polyuria, visual change and weakness. There are no hypoglycemic complications. Symptoms are stable. There are no diabetic complications. Risk factors for coronary artery disease include diabetes mellitus, dyslipidemia and hypertension (on steroid therapy). Current diabetic treatment includes oral agent (monotherapy) and insulin injections. She is compliant with treatment all of the time. She is currently taking insulin pre-breakfast, pre-dinner and at bedtime. Insulin injections are given by significant other. Rotation sites for injection include the abdominal wall and thighs. She is following a generally healthy diet. She rarely participates in exercise. Her overall blood glucose range is 130-140 mg/dl. An ACE inhibitor/angiotensin II receptor blocker is not being taken. She does not see a podiatrist.Eye exam is current.     At last pharmacy encounter her Metformin was increased from 2 tablets daily up to 3 tablets daily and her Lantus dose was to be reduced to 40 units daily on 05/06/24 with reduced prednisone dose. Today we are following up to see how her blood sugars have been since these changes.     She saw Dr. Elder on 04/23/24 and her presumptive prednisone taper was planned:   May 6th- 20 mg daily   May 20th - 15 mg daily   June 3rd - 12.5 mg daily  June 17th - 10 mg daily     She reports that if her blood sugar in the evening is <120 mg/dL she will not take her evening dose of Humalog or  Metformin due to dropping overnight into the morning.     She also reports that her stomach is bruised from her multiple subcutaneous injections, both her insulin and and Actemra but more so from the insulin since she was giving up to 5 injections daily when she was splitting her Lantus dose.     Past Medical History:  She has a past medical history of Headache, unspecified, Personal history of diseases of the blood and blood-forming organs and certain disorders involving the immune mechanism, and Personal history of pneumonia (recurrent).    Past Surgical History:  She has a past surgical history that includes Other surgical history (11/03/2022); Other surgical history (11/03/2022); Other surgical history (11/03/2022); Other surgical history (11/03/2022); Other surgical history (11/03/2022); Other surgical history (11/03/2022); Other surgical history (11/03/2022); and BI stereotactic guided breast right localization and biopsy (Right, 12/18/2023).    Social History:  She reports that she has never smoked. She has never used smokeless tobacco. She reports that she does not currently use alcohol. She reports that she does not use drugs.    Family History:  No family history on file.    Allergies:  Meloxicam, Salsalate, Glimepiride, Statins-hmg-coa reductase inhibitors, Adhesive, and Latex    Current diet:   Breakfast: 1 piece of multi grain whole wheat bread with organic peanut butter and a little cinnamon; 1/2 cup of coffee with cream (states it is plain but not flavored) or black tea with cinnamon   States she has to take bread with her prednisone to avoid stomach upset   Lunch: if at home she will have a protein with vegetable (hamburger with zucchini, green beans, and spinach today), or a protein drink (if she's working) - 5 grams of carbs per protein drink   Dinner: tweaks what she makes the rest of her household, will use low-carb wraps with protein and vegetables (grilled chicken)   Snacks: nuts, beef sticks  throughout the day   Fluids: water with lemon, occasionally with crystal light; does not use artificial sweeteners   States she avoids processed foods  Keeps protein shake on her if she is not eating a lot throughout the day     Current exercise:   No resistance or strength training  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 or will walk around the store     The patient does have a known family history of diabetes (father, mother)     Patient is using: glucometer and continuous glucose monitor  Full 2-week report attached to this encounter  Has continued to improve compared with last pharmacy encounter       Hypoglycemia frequency: 4% - have all been overnight into the morning   Hypoglycemia awareness: Yes     Adverse Effects: none reported     Objective     Diabetes Pharmacotherapy:  Metformin  mg 1 tablet twice daily (did not increase up to 3 tablets due to some stomach upset when taking 2 tablets)   Insulin glargine (Lantus Solostar) 40 units daily at bedtime   Insulin lispro (Humalog Kwikpen) 10 units with breakfast and morning prednisone dose, 5 units with dinner, and 5 units with evening prednisone dose    Historical Diabetes Pharmacotherapy:   Glimepiride 1 mg (patient reported headache, stomach upset, and generally not feeling good while on it and wanted to stop it)    Primary/Secondary Prevention   Statin? No  ACE-I/ARB? No  Aspirin? No    Pertinent PMH Review:  PMH of Pancreatitis: No  PMH of Retinopathy: No  PMH of Urinary Tract Infections: No  PMH of MTC: No  PMH of MEN 2: No     Lab Review  Lab Results   Component Value Date    BILITOT 0.6 03/16/2024    CALCIUM 10.3 03/16/2024    CO2 29 03/16/2024    CL 99 03/16/2024    CREATININE 0.63 03/16/2024    GLUCOSE 197 (H) 03/16/2024    ALKPHOS 78 03/16/2024    K 4.5 03/16/2024    PROT 6.1 (L) 03/16/2024     03/16/2024    AST 12 03/16/2024    ALT 28 03/16/2024    BUN 16 03/16/2024    ANIONGAP 15 03/16/2024    MG 2.09 07/25/2022    ALBUMIN 3.7  "03/16/2024    GFRF >90 05/22/2023     Lab Results   Component Value Date    TRIG 147 03/16/2024    CHOL 223 (H) 03/16/2024    LDLCALC 128 (H) 03/16/2024    HDL 65.7 03/16/2024     Lab Results   Component Value Date    HGBA1C 7.8 (H) 12/29/2023    HGBA1C 6.6 (A) 05/22/2023    HGBA1C 7.0 (A) 07/25/2022     No components found for: \"UACR\"  The 10-year ASCVD risk score (Nathan MIRAMONTES, et al., 2019) is: 3.4%    Values used to calculate the score:      Age: 63 years      Sex: Female      Is Non- : No      Diabetic: Yes      Tobacco smoker: No      Systolic Blood Pressure: 90 mmHg      Is BP treated: No      HDL Cholesterol: 65.7 mg/dL      Total Cholesterol: 223 mg/dL    Health Maintenance:   Foot Exam: None, patient states that Dr. Elder did look at her feet in-office recently   Eye Exam: 02/20/24  Lipid Panel:  mg/dL,  mg/dL 03/16/24  Urine Albumin: None   Influenza Immunization: does not get the flu shot   Pneumonia Immunization: none     Drug Interactions:  No significant drug-drug interactions exist requiring adjustment to medication therapy.     Assessment/Plan   Problem List Items Addressed This Visit       Type 2 diabetes mellitus without complication, without long-term current use of insulin (Multi) - Primary     Kimberley Murillo has uncontrolled type 2 diabetes mellitus complicated by steroid use and hypertension as evidenced by her most recent A1c of 7.8% on 12/29/23 which had increased from 6.6% on 05/22/23. Her time in range over the past 14 days has been 83% which has continued to increase from 64% over the 14 days prior to our last pharmacy encounter. She has tolerated the reduction in Lantus dose well and has had some hypoglycemia episodes overnight after giving her rapid acting insulin with her evening prednisone dose.   She has tried to increase her Metformin up to 3 tablets daily but states she does have stomach upset when she takes 2 tablets at a time. She is mostly " taking 1 tablet twice daily and may occasionally take an extra tablet in the evening if her blood sugars are high. She also has been skipping her dose of Humalog with her evening prednisone dose if her blood sugars are <120 mg/dL.   CHANGE:   Lantus Solostar 100 unit/mL 35 units under the skin once daily (reduced from 40 units once daily) until 05/20/24 THEN reduce further to 30 units under the skin once daily with reduced prednisone dose   Humalog Kwikpen 100 unit/mL 5 units under the skin with breakfast and 10 units under the skin with dinner (changed from 10 units with breakfast and morning prednisone dose, 5 units with dinner, and 5 units with evening prednisone dose)  CONTINUE: Metformin  mg 1 tablet by mouth twice daily   Patient's prednisone dose will decrease down to 15 mg daily on 05/20/24.   Patient also reported bruising in her abdomen from her insulin injections. We discussed changing her injection site to her thighs to give her abdomen a break and she was counseled to make sure that they are injecting into the fat on the outer sides of the thighs.   We will plan to follow-up in ~2 weeks to see how patient's blood sugars have been with reduced insulin and reduced prednisone doses and to discuss further titration down. She was encouraged to reach out with any questions and/or concerns prior to our next follow-up.          Relevant Orders    Follow Up In [facility] - Pharmacy     Pharmacy Follow-Up: 05/28/2024  Physician Follow-Up: 05/20/2024    Steven Gilbert, [place]     Continue all meds under the continuation of care with the referring provider and clinical pharmacy team.

## 2024-05-14 NOTE — ASSESSMENT & PLAN NOTE
Kimberley Murillo has uncontrolled type 2 diabetes mellitus complicated by steroid use and hypertension as evidenced by her most recent A1c of 7.8% on 12/29/23 which had increased from 6.6% on 05/22/23. Her time in range over the past 14 days has been 83% which has continued to increase from 64% over the 14 days prior to our last pharmacy encounter. She has tolerated the reduction in Lantus dose well and has had some hypoglycemia episodes overnight after giving her rapid acting insulin with her evening prednisone dose.   She has tried to increase her Metformin up to 3 tablets daily but states she does have stomach upset when she takes 2 tablets at a time. She is mostly taking 1 tablet twice daily and may occasionally take an extra tablet in the evening if her blood sugars are high. She also has been skipping her dose of Humalog with her evening prednisone dose if her blood sugars are <120 mg/dL.   CHANGE:   Lantus Solostar 100 unit/mL 35 units under the skin once daily (reduced from 40 units once daily) until 05/20/24 THEN reduce further to 30 units under the skin once daily with reduced prednisone dose   Humalog Kwikpen 100 unit/mL 5 units under the skin with breakfast and 10 units under the skin with dinner (changed from 10 units with breakfast and morning prednisone dose, 5 units with dinner, and 5 units with evening prednisone dose)  CONTINUE: Metformin  mg 1 tablet by mouth twice daily   Patient's prednisone dose will decrease down to 15 mg daily on 05/20/24.   Patient also reported bruising in her abdomen from her insulin injections. We discussed changing her injection site to her thighs to give her abdomen a break and she was counseled to make sure that they are injecting into the fat on the outer sides of the thighs.   We will plan to follow-up in ~2 weeks to see how patient's blood sugars have been with reduced insulin and reduced prednisone doses and to discuss further titration down. She was encouraged  to reach out with any questions and/or concerns prior to our next follow-up.

## 2024-05-19 NOTE — PROGRESS NOTES
Subjective   Patient ID: Kimberley Murillo is a 63 y.o. female who presents for follow up regarding GCA and large vessel vasculitis.    HPI 63-year-old female here for follow-up regarding GCA and large vessel vasculitis.  Her  is also present.     The patient relates that she had surgery for a left hip labral tear September 2022.  She had tripped and fallen on black pavement.  She states that her hip feels more stable after the surgery.     Her  notes that she seemed to develop more joint pain following the surgery.  She complains of some pain in her shoulders, neck, also fingers and toes..  Her shoulders, neck and hips hurt first thing in the morning.  She is stiff for 30 to 45 minutes in the morning.     The patient also complains of significant fatigue which started summer 2023.  She denies unintentional weight loss, fevers or night sweats.     In December 2023,  she started having difficulty with her right hand going numb and her fingers turning white when she is doing activities with her hands overhead, such as washing her hair.  She also notes that her arms got fatigued very quickly in this position, and she developed pain in her right proximal arm and right forearm.  She was having difficulty since December 2023 doing activities such as carrying a pot of spaghetti, as she notes that her right arm started hurting in her arms fatigue easily.  Her  thinks that she has lost muscle mass in her proximal arms.  She notes that her fingers cramp if she is trying to use her right hand.  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     The patient has a history of migraine headaches for several years.  She was to get a migraine every 2 weeks for several years.  Over the last several months, she gets 1-2 migraines per week.  She describes her migraines as starting in her neck, radiate over the entire head.  The headaches are associated with  photophobia.  She gets nausea but no vomiting.  She uses Excedrin Migraine for the headaches.     Dr. Tesfaye saw the patient January 25, 2024 for evaluation of upper extremity arterial insufficiency.  Physical exam noted by Dr. Tesfaye 1/25/24   included nonpalpable right brachial, right radial, and right ulnar pulses.  She was noted to have a weak left brachial and radial pulse.  She had palpable femoral and popliteal pulses that seemed normal.     CTA of chest 1/27/24  shows multifocal disease within the right subclavian and axillary artery with diffuse fusiform narrowing of the distal subclavian artery with proximal occlusion of the axillary artery.  The appearance of the multifocal narrowing and vessel thickening raises the question of a nonatherosclerotic vasculitis/arteritis.  She also has mild to moderate left subclavian and axillary artery disease with smooth fusiform narrowing of the distal subclavian and proximal axillary artery.    She had bilateral temporal artery biopsies done February 5, 2024 with concerns for giant cell arteritis.  The biopsies did not show evidence of vasculitis.    Prednisone 30 mg twice daily was started 2/02/24.  Unfortunately her blood sugar has been quite elevated.  She is known to be diabetic but was not previously on any medication.  Hemoglobin A1c was 7.8 December 2023-she was trying to manage her blood sugar with diet alone and had been doing better prior to starting prednisone.    Blood sugar started running as high as 500-600 after starting prednisone.  She did start metformin  mg with dinner 2/12/24.  She tried Amaryl but got severe dyspepsia from this.    She is now on metformin  mg with dinner, Lantus insulin 55 units at nighttime, Humalog KwikPen 5 to 10 units with breakfast, 5 units with dinner, 5 units with evening prednisone dose.  Patient also got a Dexcom device.  Our pharmacist has been assisting with glucose management.  Blood sugar most of the time is  now in the 1 20-1 80 range.  It will peak at 250 sometimes.    Prednisone was reduced to 25 mg daily April 22, 2024.  She started Actemra 162 mg subcutaneous weekly-has had 2 injections so far.    She is still getting some arm claudication symptoms, has not yet noted much improvement.  She continues to deny headaches, jaw claudication.  She feels as though her glasses are not working as well-last eye exam was January 2024 (I suspect this is due to fluctuating blood sugar).    She did start alendronate 70 mg orally weekly because of the osteopenia (for prevention of glucocorticoid induced osteoporosis).  Unfortunately she is getting epigastric and periumbilical abdominal pain after taking alendronate.  This has happened after the last 3 doses, so I do not think she will be able to tolerate this.    Labs 7/22: RF 37, ESR 21  Labs 11/22: JASSI negative, JASSI panel negative, ESR 13, CRP 1.7 (normal less than 1), CCP negative,   Labs 5/23: Hemoglobin A1c 6.6, cholesterol 211, HDL 56, , triglycerides 107, TSH 1.5, BMP normal except glucose 123  Labs 7/23: CBC normal, UA normal, 25-hydroxy vitamin D 22  Labs 12/23: Hemoglobin A1c 7.8, CMP normal except glucose 134   Labs 1/31/24: ESR 39 (0-30), CRP 6.11 (less than 1), JASSI negative, rheumatoid factor 18 (0-15), CCP negative, IgG4 normal, ANCA negative, CBC with differential normal  Labs March 2024: ESR 23, CRP 0.94, CBC normal except white blood cell count 13.6, CMP normal except glucose 197, cholesterol 223, HDL 65, , triglycerides 147  Labs April 18, 2024: ESR 3, CRP less than 0.1 (less than 1)  Labs 5/24: CMP normal except glucose 48, CBC with differential normal, ESR 1, CRP less than 0.1, cholesterol 256, HDL 88, , triglycerides 135       CXR 12/22: Heart is not enlarged, lungs are clear.  Multilevel degenerative changes noted of the thoracic spine.     Cardiac calcium score August 2020: 0    DEXA April 2024: T-score -2.2 left femoral neck, T-score  -1.5 left total hip, T-score normal lumbar spine.  Estimated 10-year fracture risk by FRAX is 3.1% for hip fracture and 17% for major osteoporotic fracture.     Medical problem list:  Type 2 diabetes-currently managed with metformin 500 mg twice daily . Hemoglobin A1c was 7.8 December 2023.  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  Denies use of tobacco. Denies use of alcohol.  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     Family history:  Father- passed away from CAD, RA  Mother- diabetes, CAD  Paternal grandmother- RA    ROS:  General: Denies fevers or chills.  CV: Denies chest pain or palpitations.  Denies leg edema.  Lungs: Denies coughing or shortness of breath.  Skin: Denies rashes or nodules.  MS: Still gets some arm claudication, especially right arm.  Her hands go numb when she holds an overhead.    Objective   There were no vitals taken for this visit.    Physical Exam  HEENT: PERRL, EOMI. S/p bilateral temporary biopsies.  Neck: Supple, no nodes.  CV: RRR, no MGR. no bruits heard over neck or upper chest.  Lungs: Clear, no rales or wheezes.  Abdomen: Soft, nontender. No hepatosplenomegaly.  Extremities:  No cyanosis, clubbing, or edema.  MS: No synovitis.  Skin: No rashes or nodules.  Pulses: Radial and ulnar pulses are absent in the right upper extremity.  She has a weak left radial and ulnar pulse.  Hands are warm with good capillary refill.    Assessment/Plan   Problem List Items Addressed This Visit             ICD-10-CM    Giant cell arteritis (Multi) - Primary M31.6           GCA-has large vessel involvement without cranial arteritis (had negative bilateral temporary  biopsies).  Presented with complaints of joint aching involving shoulders, neck, and  hips (less so hands and feet) since 9/22. (Likely PMR)  Complains of significant fatigue with 30 to 45 minutes of morning stiffness since summer 2023.  Also had right arm claudication since 12/23.  Initial labs remarkable for ESR of 39 and CRP 6.11 (normal less than 1).    CT angio chest with and without contrast done January 26, 2024 shows multifocal narrowing and vessel thickening in the right subclavian artery, including a long segment circumferential narrowing measuring 1.5 cm (with 75% narrowing).  There is abrupt narrowing and occlusion of the proximal right axillary artery.  There is mild to moderate narrowing of the distal left subclavian artery and mild to moderate narrowing of the proximal axillary artery on the left .  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     She started prednisone 30 mg twice daily February 2, 2024 with some improvement of her arm claudication.  Prednisone was tapered to 30 mg in morning and 20 mg in evening March 7, 2024.  Blood pressure can be heard faintly in the left arm, where she has a weak radial pulse.  Blood pressure can still not be heard in the right arm and radial pulse cannot be palpated.    Actemra 162 mg subcutaneous weekly was started 2 weeks ago.    Diabetes-currently under better control with metformin  mg 1000 mg in morning and 5 mg in evening, Lantus 55 units at bedtime, Humalog KwikPen 10 units with breakfast, 5 units with dinner, 5 units with evening prednisone dose.  Last hemoglobin A1c December 2023 was 7.8.     Osteopenia- unfortunately she is getting-abdominal pain after taking the alendronate (has happened after 3 doses).  I advised her to stop this for now.    Plan:  Presumptive prednisone taper will be as follows:  May 6: Prednisone 20 mg daily.  May 20: 15 mg daily.  Ivette 3: 12.5 mg daily.  June 17: 10 mg daily.  Repeat labs in 2 weeks: CBC with diff,  CMP, ESR, CRP, lipids.  Follow up in 1 month.

## 2024-05-20 ENCOUNTER — APPOINTMENT (OUTPATIENT)
Dept: PRIMARY CARE | Facility: CLINIC | Age: 64
End: 2024-05-20
Payer: COMMERCIAL

## 2024-05-20 ENCOUNTER — APPOINTMENT (OUTPATIENT)
Dept: RHEUMATOLOGY | Facility: CLINIC | Age: 64
End: 2024-05-20
Payer: COMMERCIAL

## 2024-05-20 ENCOUNTER — OFFICE VISIT (OUTPATIENT)
Dept: RHEUMATOLOGY | Facility: CLINIC | Age: 64
End: 2024-05-20
Payer: COMMERCIAL

## 2024-05-20 ENCOUNTER — TELEPHONE (OUTPATIENT)
Dept: RHEUMATOLOGY | Facility: CLINIC | Age: 64
End: 2024-05-20

## 2024-05-20 VITALS
OXYGEN SATURATION: 97 % | WEIGHT: 198.8 LBS | HEART RATE: 71 BPM | TEMPERATURE: 97.9 F | HEIGHT: 64 IN | BODY MASS INDEX: 33.94 KG/M2

## 2024-05-20 DIAGNOSIS — M31.6 GIANT CELL ARTERITIS (MULTI): Primary | ICD-10-CM

## 2024-05-20 DIAGNOSIS — R73.9 HYPERGLYCEMIA: ICD-10-CM

## 2024-05-20 DIAGNOSIS — Z78.9 STATIN INTOLERANCE: ICD-10-CM

## 2024-05-20 DIAGNOSIS — E11.9 TYPE 2 DIABETES MELLITUS WITHOUT COMPLICATION, WITH LONG-TERM CURRENT USE OF INSULIN (MULTI): ICD-10-CM

## 2024-05-20 DIAGNOSIS — E11.9 TYPE 2 DIABETES MELLITUS WITHOUT COMPLICATION, WITHOUT LONG-TERM CURRENT USE OF INSULIN (MULTI): ICD-10-CM

## 2024-05-20 DIAGNOSIS — Z79.4 TYPE 2 DIABETES MELLITUS WITHOUT COMPLICATION, WITH LONG-TERM CURRENT USE OF INSULIN (MULTI): ICD-10-CM

## 2024-05-20 LAB — POC HEMOGLOBIN A1C: 6.9 % (ref 4.2–6.5)

## 2024-05-20 PROCEDURE — 3008F BODY MASS INDEX DOCD: CPT | Performed by: INTERNAL MEDICINE

## 2024-05-20 PROCEDURE — 3050F LDL-C >= 130 MG/DL: CPT | Performed by: INTERNAL MEDICINE

## 2024-05-20 PROCEDURE — 99214 OFFICE O/P EST MOD 30 MIN: CPT | Performed by: INTERNAL MEDICINE

## 2024-05-20 PROCEDURE — 83036 HEMOGLOBIN GLYCOSYLATED A1C: CPT | Performed by: INTERNAL MEDICINE

## 2024-05-20 RX ORDER — PREDNISONE 5 MG/1
12.5 TABLET ORAL DAILY
Qty: 75 TABLET | Refills: 3 | Status: SHIPPED | OUTPATIENT
Start: 2024-05-20 | End: 2024-11-16

## 2024-05-20 NOTE — TELEPHONE ENCOUNTER
Patient seen today and advised to come back in 4-6 weeks.   Your next available is not until August.   Patient states she is available the week of June 17 or anytime that you can fit her into your schedule.     Please advise as to where to place patient on your schedule.

## 2024-05-20 NOTE — PATIENT INSTRUCTIONS
Presumptive prednisone taper will be as follows:  May 20: 15 mg daily.  Ivette 3: 12.5 mg daily.  June 17: 10 mg daily.  July 1st: 7.5 mg daily  July 15: 5 mg daily.  July 29: 2.5 mg daily.  August 12: stop prednisone.  Check labs in 1 month: CBC with diff, CMP, ESR, CRP.  Follow up in 4-6 weeks.

## 2024-05-20 NOTE — PROGRESS NOTES
Subjective   Patient ID: Kimberley Murillo is a 63 y.o. female who presents for Follow-up regarding GCA and large vessel vasculitis.    HPI 63-year-old female here for follow-up regarding GCA and large vessel vasculitis.  Her  is also present.     The patient relates that she had surgery for a left hip labral tear September 2022.  She had tripped and fallen on black pavement.  She states that her hip feels more stable after the surgery.     Her  notes that she seemed to develop more joint pain following the surgery.  She complains of some pain in her shoulders, neck, also fingers and toes..  Her shoulders, neck and hips hurt first thing in the morning.  She is stiff for 30 to 45 minutes in the morning.     The patient also complains of significant fatigue which started summer 2023.  She denies unintentional weight loss, fevers or night sweats.     In December 2023,  she started having difficulty with her right hand going numb and her fingers turning white when she is doing activities with her hands overhead, such as washing her hair.  She also notes that her arms got fatigued very quickly in this position, and she developed pain in her right proximal arm and right forearm.  She was having difficulty since December 2023 doing activities such as carrying a pot of spaghetti, as she notes that her right arm started hurting in her arms fatigue easily.  Her  thinks that she has lost muscle mass in her proximal arms.  She notes that her fingers cramp if she is trying to use her right hand.  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     The patient has a history of migraine headaches for several years.  She was to get a migraine every 2 weeks for several years.  Over the last several months, she gets 1-2 migraines per week.  She describes her migraines as starting in her neck, radiate over the entire head.  The headaches are associated with  photophobia.  She gets nausea but no vomiting.  She uses Excedrin Migraine for the headaches.     Dr. Tesfaye saw the patient January 25, 2024 for evaluation of upper extremity arterial insufficiency.  Physical exam noted by Dr. Tesfaye 1/25/24   included nonpalpable right brachial, right radial, and right ulnar pulses.  She was noted to have a weak left brachial and radial pulse.  She had palpable femoral and popliteal pulses that seemed normal.     CTA of chest 1/27/24  shows multifocal disease within the right subclavian and axillary artery with diffuse fusiform narrowing of the distal subclavian artery with proximal occlusion of the axillary artery.  The appearance of the multifocal narrowing and vessel thickening raises the question of a nonatherosclerotic vasculitis/arteritis.  She also has mild to moderate left subclavian and axillary artery disease with smooth fusiform narrowing of the distal subclavian and proximal axillary artery.    She had bilateral temporal artery biopsies done February 5, 2024 with concerns for giant cell arteritis.  The biopsies did not show evidence of vasculitis.    Prednisone 30 mg twice daily was started 2/02/24.  Unfortunately her blood sugar has been quite elevated.  She is known to be diabetic but was not previously on any medication.  Hemoglobin A1c was 7.8 December 2023-she was trying to manage her blood sugar with diet alone and had been doing better prior to starting prednisone.    Blood sugar started running as high as 500-600 after starting prednisone.  She did start metformin  mg with dinner 2/12/24.  She tried Amaryl but got severe dyspepsia from this.    She is now on metformin XR 1000 mg with breakfast and 500 mg with dinner, Lantus insulin 40 units at nighttime, Humalog KwikPen 5 to 10 units with breakfast, 5 units with dinner, 5 units with evening prednisone dose.  Patient also got a Dexcom device.  Our pharmacist has been assisting with glucose management.  Blood  sugar is now usually at goal.  HbA1C May 20, 2024 is 6.9.    Prednisone was reduced to 15 mg daily May 20, 2024.  She started Actemra 162 mg subcutaneous weekly 4/24.    She is still getting some arm claudication symptoms, has not yet noted much improvement.  She continues to deny headaches, jaw claudication.  She feels as though her glasses are not working as well-last eye exam was January 2024 (I suspect this is due to fluctuating blood sugar).    She did start alendronate 70 mg orally weekly because of the osteopenia (for prevention of glucocorticoid induced osteoporosis).  Unfortunately she is getting epigastric and periumbilical abdominal pain after taking alendronate.  This has happened after the last 3 doses, so I do not think she will be able to tolerate this.    Labs 7/22: RF 37, ESR 21  Labs 11/22: JASSI negative, JASSI panel negative, ESR 13, CRP 1.7 (normal less than 1), CCP negative,   Labs 5/23: Hemoglobin A1c 6.6, cholesterol 211, HDL 56, , triglycerides 107, TSH 1.5, BMP normal except glucose 123  Labs 7/23: CBC normal, UA normal, 25-hydroxy vitamin D 22  Labs 12/23: Hemoglobin A1c 7.8, CMP normal except glucose 134   Labs 1/31/24: ESR 39 (0-30), CRP 6.11 (less than 1), JASSI negative, rheumatoid factor 18 (0-15), CCP negative, IgG4 normal, ANCA negative, CBC with differential normal  Labs March 2024: ESR 23, CRP 0.94, CBC normal except white blood cell count 13.6, CMP normal except glucose 197, cholesterol 223, HDL 65, , triglycerides 147  Labs April 18, 2024: ESR 3, CRP less than 0.1 (less than 1)  Labs May 2024: ESR 1, CRP less than 0.1 (normal less than 1), cholesterol 256, HDL 88, , triglycerides 135, CBC normal, CMP normal X glucose 48     CXR 12/22: Heart is not enlarged, lungs are clear.  Multilevel degenerative changes noted of the thoracic spine.     Cardiac calcium score August 2020: 0    DEXA April 2024: T-score -2.2 left femoral neck, T-score -1.5 left total hip, T-score  "normal lumbar spine.  Estimated 10-year fracture risk by FRAX is 3.1% for hip fracture and 17% for major osteoporotic fracture.     Medical problem list:  Type 2 diabetes-currently managed with metformin 500 mg twice daily . Hemoglobin A1c was 7.8 December 2023.  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  Denies use of tobacco. Denies use of alcohol.  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     Family history:  Father- passed away from CAD, RA  Mother- diabetes, CAD  Paternal grandmother- RA    ROS:  General: Denies fevers or chills.  CV: Denies chest pain or palpitations.  Denies leg edema.  Lungs: Denies coughing or shortness of breath.  Skin: Denies rashes or nodules.  MS: Still gets some arm claudication, especially right arm.  Her hands go numb when she holds an overhead.    Objective   Pulse 71   Temp 36.6 °C (97.9 °F)   Ht 1.626 m (5' 4\")   Wt 90.2 kg (198 lb 12.8 oz)   SpO2 97%   BMI 34.12 kg/m²   /92 right leg.  Still cannot obtain blood pressure in either upper extremity.    Physical Exam  HEENT: PERRL, EOMI. S/p bilateral temporary biopsies.  Neck: Supple, no nodes.  CV: RRR, no MGR. no bruits heard over neck or upper chest.  Lungs: Clear, no rales or wheezes.  Abdomen: Soft, nontender. No hepatosplenomegaly.  Extremities:  No cyanosis, clubbing, or edema.  MS: No synovitis.  Skin: No rashes or nodules.  Pulses: Radial and ulnar pulses are absent in the both upper extremity.    Hands are warm with good capillary refill.    Assessment/Plan   Problem List Items Addressed This Visit             ICD-10-CM    Giant cell arteritis (Multi) - Primary M31.6     Problem List Items " Addressed This Visit             ICD-10-CM    Giant cell arteritis (Multi) - Primary M31.6    Statin intolerance Z78.9     Other Visit Diagnoses         Codes    Type 2 diabetes mellitus without complication, with long-term current use of insulin (Multi)     E11.9, Z79.4    Relevant Orders    POCT glycosylated hemoglobin (Hb A1C) manually resulted (Completed)          GCA-has large vessel involvement without cranial arteritis (had negative bilateral temporary biopsies).  Presented with complaints of joint aching involving shoulders, neck, and  hips (less so hands and feet) since 9/22. (Likely PMR)  Complains of significant fatigue with 30 to 45 minutes of morning stiffness since summer 2023.  Also had right arm claudication since 12/23.  Initial labs remarkable for ESR of 39 and CRP 6.11 (normal less than 1).    CT angio chest with and without contrast done January 26, 2024 shows multifocal narrowing and vessel thickening in the right subclavian artery, including a long segment circumferential narrowing measuring 1.5 cm (with 75% narrowing).  There is abrupt narrowing and occlusion of the proximal right axillary artery.  There is mild to moderate narrowing of the distal left subclavian artery and mild to moderate narrowing of the proximal axillary artery on the left .  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     She started prednisone 30 mg twice daily February 2, 2024 with some improvement of her arm claudication.  Prednisone was tapered to 15 mg daily May 20, 2024.  Actemra 162 mg subcutaneous weekly was started early April 2024.  I discussed with the patient today that if claudication symptoms do not continue to improve, we may consider having her see a vascular surgeon to see if stent placement would be an option (after being tapered off prednisone).    Diabetes-currently under better control with metformin XR 1000 mg in morning and 500 mg in evening, Lantus 40 units at bedtime, Humalog KwikPen 10  units with breakfast, 5 units with dinner, 5 units with evening prednisone dose.  Hemoglobin A1c is 6.9 on May 20, 2024.    Osteopenia- unfortunately she is getting-abdominal pain after taking the alendronate (has happened after 3 doses).  I advised her to stop this for now.    Hyperlipidemia-she has a history of statin intolerance.  She tried 2 different statins previously which caused myalgias.    Plan:  Presumptive prednisone taper will be as follows:  May 20: 15 mg daily.  Ivette 3: 12.5 mg daily.  June 17: 10 mg daily.  July 1st: 7.5 mg daily  July 15: 5 mg daily.  July 29: 2.5 mg daily.  August 12: stop prednisone.  Check labs in 1 month: CBC with diff, CMP, ESR, CRP.  Follow up in 4-6 weeks.

## 2024-05-22 RX ORDER — INSULIN GLARGINE 100 [IU]/ML
40 INJECTION, SOLUTION SUBCUTANEOUS NIGHTLY
Qty: 1 EACH | Refills: 2 | Status: SHIPPED | OUTPATIENT
Start: 2024-05-22 | End: 2024-06-04

## 2024-05-27 ASSESSMENT — ENCOUNTER SYMPTOMS
FATIGUE: 1
BLURRED VISION: 1
VISUAL CHANGE: 1
POLYDIPSIA: 1
WEAKNESS: 1

## 2024-05-28 ENCOUNTER — TELEMEDICINE (OUTPATIENT)
Dept: PHARMACY | Facility: HOSPITAL | Age: 64
End: 2024-05-28
Payer: COMMERCIAL

## 2024-05-28 DIAGNOSIS — E11.9 TYPE 2 DIABETES MELLITUS WITHOUT COMPLICATION, WITHOUT LONG-TERM CURRENT USE OF INSULIN (MULTI): Primary | ICD-10-CM

## 2024-05-28 NOTE — PROGRESS NOTES
Patient is sent at the request of Kelsey Lombardi,Emile for my opinion regarding Type 2 diabetes.  My final recommendations will be communicated back to the requesting provider by way of shared medical record.    Subjective     Diabetes  She presents for her follow-up diabetic visit. She has type 2 diabetes mellitus. The initial diagnosis of diabetes was made 1 year ago. Her disease course has been improving. There are no hypoglycemic associated symptoms. Associated symptoms include blurred vision, fatigue, polydipsia, polyuria, visual change and weakness. There are no hypoglycemic complications. Symptoms are stable. There are no diabetic complications. Risk factors for coronary artery disease include diabetes mellitus, dyslipidemia and hypertension (on steroid therapy). Current diabetic treatment includes oral agent (monotherapy) and insulin injections. She is compliant with treatment all of the time. She is currently taking insulin pre-breakfast, pre-dinner and at bedtime. Insulin injections are given by significant other. Rotation sites for injection include the abdominal wall and thighs. She is following a generally healthy diet. She rarely participates in exercise. Her overall blood glucose range is 130-140 mg/dl. An ACE inhibitor/angiotensin II receptor blocker is not being taken. She does not see a podiatrist.Eye exam is current.     At last pharmacy encounter her Lantus was reduced to 35 units once daily with instructions to reduce further down to 30 units daily with reduced prednisone dose on 05/20/24. Her Humalog was also changed to 5 units with breakfast and 10 units with dinner. She was previously taking 10 units with breakfast and morning prednisone dose, 5 units with dinner, and 5 units with evening prednisone dose.     She reduced down to 15 mg of prednisone daily on 05/20/24. We are following-up today to see how she has tolerated the reduction in insulin and prednisone dose and to see if she needs  and more adjustments to her diabetic regimen as her steroids decrease.     She saw Dr. Elder last on 05/20/2024 and her presumptive prednisone taper was planned:   May 20th - 15 mg daily   June 3rd - 12.5 mg daily  June 17th - 10 mg daily   July 1st: 7.5 mg daily  July 15th: 5 mg daily  July 29th: 2.5 mg daily  August 12: stop prednisone     Past Medical History:  She has a past medical history of Headache, unspecified, Personal history of diseases of the blood and blood-forming organs and certain disorders involving the immune mechanism, and Personal history of pneumonia (recurrent).    Past Surgical History:  She has a past surgical history that includes Other surgical history (11/03/2022); Other surgical history (11/03/2022); Other surgical history (11/03/2022); Other surgical history (11/03/2022); Other surgical history (11/03/2022); Other surgical history (11/03/2022); Other surgical history (11/03/2022); and BI stereotactic guided breast right localization and biopsy (Right, 12/18/2023).    Social History:  She reports that she has never smoked. She has never used smokeless tobacco. She reports that she does not currently use alcohol. She reports that she does not use drugs.    Family History:  No family history on file.    Allergies:  Meloxicam, Salsalate, Glimepiride, Statins-hmg-coa reductase inhibitors, Adhesive, and Latex    Current diet:   Breakfast: 1 piece of multi grain whole wheat bread with organic peanut butter and a little cinnamon; 1/2 cup of coffee with cream (states it is plain but not flavored) or black tea with cinnamon   States she has to take bread with her prednisone to avoid stomach upset   Lunch: if at home she will have a protein with vegetable (hamburger with zucchini, green beans, and spinach today), or a protein drink (if she's working) - 5 grams of carbs per protein drink   Dinner: tweaks what she makes the rest of her household, will use low-carb wraps with protein and vegetables  (grilled chicken)   Snacks: nuts, beef sticks throughout the day   Fluids: water with lemon, occasionally with crystal light; does not use artificial sweeteners   States she avoids processed foods  Keeps protein shake on her if she is not eating a lot throughout the day     Current exercise:   No resistance or strength training  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 or will walk around the store   Has been trying to walk more after dinner    The patient does have a known family history of diabetes (father, mother)     Patient is using: glucometer and continuous glucose monitor  Full 2-week report attached to this encounter  Has continued to improve compared with last pharmacy encounter (time in range increased from 83% to 84%)  States when she was outside working her blood sugars spiked twice up into the 300s       Hypoglycemia frequency: 0%   Notes that she had 2 episodes of hypoglycemia after taking 2 tablets of metformin in the evening (states they were back to back)  Hypoglycemia awareness: Yes     Adverse Effects: none reported     Objective     Diabetes Pharmacotherapy:  Metformin  mg 1 tablet twice daily (did not increase up to 3 tablets due to some stomach upset when taking 2 tablets)   Insulin glargine (Lantus Solostar) 30 units daily at bedtime   Insulin lispro (Humalog Kwikpen) 5 units with breakfast and 10 units with dinner     Historical Diabetes Pharmacotherapy:   Glimepiride 1 mg (patient reported headache, stomach upset, and generally not feeling good while on it and wanted to stop it)    Primary/Secondary Prevention   Statin? No  ACE-I/ARB? No  Aspirin? No    Pertinent PMH Review:  PMH of Pancreatitis: No  PMH of Retinopathy: No  PMH of Urinary Tract Infections: No  PMH of MTC: No  PMH of MEN 2: No     Lab Review  Lab Results   Component Value Date    BILITOT 0.6 05/14/2024    CALCIUM 10.0 05/14/2024    CO2 30 05/14/2024     05/14/2024    CREATININE 0.65 05/14/2024    GLUCOSE 48 (L)  "05/14/2024    ALKPHOS 45 05/14/2024    K 3.8 05/14/2024    PROT 6.1 (L) 05/14/2024     05/14/2024    AST 17 05/14/2024    ALT 35 05/14/2024    BUN 15 05/14/2024    ANIONGAP 13 05/14/2024    MG 2.09 07/25/2022    ALBUMIN 4.0 05/14/2024    GFRF >90 05/22/2023     Lab Results   Component Value Date    TRIG 135 05/14/2024    CHOL 256 (H) 05/14/2024    LDLCALC 140 (H) 05/14/2024    HDL 88.9 05/14/2024     Lab Results   Component Value Date    HGBA1C 6.9 (A) 05/20/2024    HGBA1C 7.8 (H) 12/29/2023    HGBA1C 6.6 (A) 05/22/2023     No components found for: \"UACR\"  The ASCVD Risk score (Nathan DK, et al., 2019) failed to calculate for the following reasons:    The systolic blood pressure is missing    Health Maintenance:   Foot Exam: None, patient states that Dr. Elder did look at her feet in-office recently   Eye Exam: 02/20/24  Lipid Panel:  mg/dL,  mg/dL 05/14/24  Urine Albumin: None   Influenza Immunization: does not get the flu shot   Pneumonia Immunization: none     Drug Interactions:  No significant drug-drug interactions exist requiring adjustment to medication therapy.     Assessment/Plan   Problem List Items Addressed This Visit       Type 2 diabetes mellitus without complication, without long-term current use of insulin (Multi) - Primary     Kimberley Murillo has controlled type 2 diabetes mellitus complicated by steroid use and hypertension as evidenced by her most recent A1c of 6.9% on 05/20/24 which had decreased from 7.8% on 12/29/24. Her time in range with her CGM system has also increased from 83% at last pharmacy encounter to 84% over the past 2 weeks which is above goal of >70%. She has tolerated the reduction in both her Lantus and Humalog well over the past week. She has continued to have a couple hypoglycemic episodes overnight on the days she took 2 tablets of Metformin after dinner. She also reports a couple spikes up to the 300s while she was working outside.   CHANGE:   Humalog " Kwikpen 100 unit/mL 10 units under the skin once daily with dinner (removed breakfast dose)  Metformin  mg 1 tablet by mouth 3 times a day with meals (changed from 1 tablet mid-day and 2 tablets with dinner)  Lantus Solostar 100 unit/mL 25 units under the skin once daily (reduced starting on 06/03/24 with reduced prednisone dose, she will continue 30 units daily until then)  Her prednisone dose will decrease down to 12.5 mg daily on 06/03/24.   She will try to space out her Metformin tablets to avoid GI upset when taking 2 together and to avoid hypoglycemic overnight.   We discussed her spikes outside and how they could be happening because she is waiting too long to eat so her body is releasing glucose. We also discussed using the Chava device to see how her body reacts to different foods and things and to adjust how she is eating. She states she started having a little bit of a protein shake which has helped reduce her spikes when going a while in between meals.   We will plan to follow-up in 2 weeks to see how her blood sugars have been with reduced insulin doses and reduced prednisone dose. We will also continue to discuss further titration down of her insulin as her prednisone dose is tapered down. She was encouraged to reach out with any questions and/or concerns prior to our next follow-up.          Relevant Orders    Follow Up In [facility] - Pharmacy     Pharmacy Follow-Up: 06/11/2024   Physician Follow-Up: none currently scheduled, patient to be seen 4-6 weeks from last appointment     Steven Gilbert, PharmD     Continue all meds under the continuation of care with the referring provider and clinical pharmacy team.

## 2024-05-28 NOTE — ASSESSMENT & PLAN NOTE
Kimberley Murillo has controlled type 2 diabetes mellitus complicated by steroid use and hypertension as evidenced by her most recent A1c of 6.9% on 05/20/24 which had decreased from 7.8% on 12/29/24. Her time in range with her CGM system has also increased from 83% at last pharmacy encounter to 84% over the past 2 weeks which is above goal of >70%. She has tolerated the reduction in both her Lantus and Humalog well over the past week. She has continued to have a couple hypoglycemic episodes overnight on the days she took 2 tablets of Metformin after dinner. She also reports a couple spikes up to the 300s while she was working outside.   CHANGE:   Humalog Kwikpen 100 unit/mL 10 units under the skin once daily with dinner (removed breakfast dose)  Metformin  mg 1 tablet by mouth 3 times a day with meals (changed from 1 tablet mid-day and 2 tablets with dinner)  Lantus Solostar 100 unit/mL 25 units under the skin once daily (reduced starting on 06/03/24 with reduced prednisone dose, she will continue 30 units daily until then)  Her prednisone dose will decrease down to 12.5 mg daily on 06/03/24.   She will try to space out her Metformin tablets to avoid GI upset when taking 2 together and to avoid hypoglycemic overnight.   We discussed her spikes outside and how they could be happening because she is waiting too long to eat so her body is releasing glucose. We also discussed using the Chava device to see how her body reacts to different foods and things and to adjust how she is eating. She states she started having a little bit of a protein shake which has helped reduce her spikes when going a while in between meals.   We will plan to follow-up in 2 weeks to see how her blood sugars have been with reduced insulin doses and reduced prednisone dose. We will also continue to discuss further titration down of her insulin as her prednisone dose is tapered down. She was encouraged to reach out with any questions and/or  concerns prior to our next follow-up.

## 2024-05-29 ENCOUNTER — APPOINTMENT (OUTPATIENT)
Dept: PRIMARY CARE | Facility: CLINIC | Age: 64
End: 2024-05-29
Payer: COMMERCIAL

## 2024-06-04 ENCOUNTER — HOSPITAL ENCOUNTER (EMERGENCY)
Facility: HOSPITAL | Age: 64
Discharge: HOME | End: 2024-06-04
Attending: STUDENT IN AN ORGANIZED HEALTH CARE EDUCATION/TRAINING PROGRAM
Payer: COMMERCIAL

## 2024-06-04 ENCOUNTER — APPOINTMENT (OUTPATIENT)
Dept: RADIOLOGY | Facility: HOSPITAL | Age: 64
End: 2024-06-04
Payer: COMMERCIAL

## 2024-06-04 ENCOUNTER — OFFICE VISIT (OUTPATIENT)
Dept: PRIMARY CARE | Facility: CLINIC | Age: 64
End: 2024-06-04
Payer: COMMERCIAL

## 2024-06-04 ENCOUNTER — APPOINTMENT (OUTPATIENT)
Dept: CARDIOLOGY | Facility: HOSPITAL | Age: 64
End: 2024-06-04
Payer: COMMERCIAL

## 2024-06-04 ENCOUNTER — TELEPHONE (OUTPATIENT)
Dept: PRIMARY CARE | Facility: CLINIC | Age: 64
End: 2024-06-04

## 2024-06-04 VITALS
SYSTOLIC BLOOD PRESSURE: 215 MMHG | HEART RATE: 87 BPM | DIASTOLIC BLOOD PRESSURE: 112 MMHG | BODY MASS INDEX: 34.35 KG/M2 | HEIGHT: 64 IN | WEIGHT: 201.2 LBS | OXYGEN SATURATION: 96 % | TEMPERATURE: 97 F

## 2024-06-04 VITALS
DIASTOLIC BLOOD PRESSURE: 82 MMHG | WEIGHT: 200 LBS | HEIGHT: 65 IN | RESPIRATION RATE: 18 BRPM | HEART RATE: 87 BPM | BODY MASS INDEX: 33.32 KG/M2 | TEMPERATURE: 97.7 F | OXYGEN SATURATION: 100 % | SYSTOLIC BLOOD PRESSURE: 172 MMHG

## 2024-06-04 DIAGNOSIS — R73.9 HYPERGLYCEMIA: ICD-10-CM

## 2024-06-04 DIAGNOSIS — I10 HYPERTENSION, UNSPECIFIED TYPE: Primary | ICD-10-CM

## 2024-06-04 DIAGNOSIS — E87.6 HYPOKALEMIA: ICD-10-CM

## 2024-06-04 DIAGNOSIS — E11.9 TYPE 2 DIABETES MELLITUS WITHOUT COMPLICATION, WITHOUT LONG-TERM CURRENT USE OF INSULIN (MULTI): ICD-10-CM

## 2024-06-04 DIAGNOSIS — M31.6 GIANT CELL ARTERITIS (MULTI): ICD-10-CM

## 2024-06-04 DIAGNOSIS — Z78.9 STATIN INTOLERANCE: ICD-10-CM

## 2024-06-04 DIAGNOSIS — E03.9 ACQUIRED HYPOTHYROIDISM: ICD-10-CM

## 2024-06-04 LAB
ALBUMIN SERPL BCP-MCNC: 4.6 G/DL (ref 3.4–5)
ALP SERPL-CCNC: 47 U/L (ref 33–136)
ALT SERPL W P-5'-P-CCNC: 32 U/L (ref 7–45)
ANION GAP SERPL CALC-SCNC: 10 MMOL/L (ref 10–20)
AST SERPL W P-5'-P-CCNC: 19 U/L (ref 9–39)
BASOPHILS # BLD AUTO: 0.06 X10*3/UL (ref 0–0.1)
BASOPHILS NFR BLD AUTO: 0.8 %
BILIRUB SERPL-MCNC: 0.7 MG/DL (ref 0–1.2)
BUN SERPL-MCNC: 13 MG/DL (ref 6–23)
CALCIUM SERPL-MCNC: 10.2 MG/DL (ref 8.6–10.3)
CARDIAC TROPONIN I PNL SERPL HS: 7 NG/L (ref 0–13)
CARDIAC TROPONIN I PNL SERPL HS: 8 NG/L (ref 0–13)
CHLORIDE SERPL-SCNC: 106 MMOL/L (ref 98–107)
CO2 SERPL-SCNC: 29 MMOL/L (ref 21–32)
CREAT SERPL-MCNC: 0.65 MG/DL (ref 0.5–1.05)
EGFRCR SERPLBLD CKD-EPI 2021: >90 ML/MIN/1.73M*2
EOSINOPHIL # BLD AUTO: 0.09 X10*3/UL (ref 0–0.7)
EOSINOPHIL NFR BLD AUTO: 1.2 %
ERYTHROCYTE [DISTWIDTH] IN BLOOD BY AUTOMATED COUNT: 13.5 % (ref 11.5–14.5)
GLUCOSE SERPL-MCNC: 81 MG/DL (ref 74–99)
HCT VFR BLD AUTO: 41 % (ref 36–46)
HGB BLD-MCNC: 13.9 G/DL (ref 12–16)
IMM GRANULOCYTES # BLD AUTO: 0.06 X10*3/UL (ref 0–0.7)
IMM GRANULOCYTES NFR BLD AUTO: 0.8 % (ref 0–0.9)
LYMPHOCYTES # BLD AUTO: 3.38 X10*3/UL (ref 1.2–4.8)
LYMPHOCYTES NFR BLD AUTO: 46.4 %
MAGNESIUM SERPL-MCNC: 2 MG/DL (ref 1.6–2.4)
MCH RBC QN AUTO: 32.1 PG (ref 26–34)
MCHC RBC AUTO-ENTMCNC: 33.9 G/DL (ref 32–36)
MCV RBC AUTO: 95 FL (ref 80–100)
MONOCYTES # BLD AUTO: 0.63 X10*3/UL (ref 0.1–1)
MONOCYTES NFR BLD AUTO: 8.6 %
NEUTROPHILS # BLD AUTO: 3.07 X10*3/UL (ref 1.2–7.7)
NEUTROPHILS NFR BLD AUTO: 42.2 %
NRBC BLD-RTO: 0 /100 WBCS (ref 0–0)
PLATELET # BLD AUTO: 223 X10*3/UL (ref 150–450)
POTASSIUM SERPL-SCNC: 3.3 MMOL/L (ref 3.5–5.3)
PROT SERPL-MCNC: 6.7 G/DL (ref 6.4–8.2)
RBC # BLD AUTO: 4.33 X10*6/UL (ref 4–5.2)
SODIUM SERPL-SCNC: 142 MMOL/L (ref 136–145)
WBC # BLD AUTO: 7.3 X10*3/UL (ref 4.4–11.3)

## 2024-06-04 PROCEDURE — 3080F DIAST BP >= 90 MM HG: CPT | Performed by: NURSE PRACTITIONER

## 2024-06-04 PROCEDURE — 36415 COLL VENOUS BLD VENIPUNCTURE: CPT | Performed by: NURSE PRACTITIONER

## 2024-06-04 PROCEDURE — 3008F BODY MASS INDEX DOCD: CPT | Performed by: NURSE PRACTITIONER

## 2024-06-04 PROCEDURE — 80053 COMPREHEN METABOLIC PANEL: CPT | Performed by: NURSE PRACTITIONER

## 2024-06-04 PROCEDURE — 3077F SYST BP >= 140 MM HG: CPT | Performed by: NURSE PRACTITIONER

## 2024-06-04 PROCEDURE — 99285 EMERGENCY DEPT VISIT HI MDM: CPT

## 2024-06-04 PROCEDURE — 70450 CT HEAD/BRAIN W/O DYE: CPT | Performed by: RADIOLOGY

## 2024-06-04 PROCEDURE — 99396 PREV VISIT EST AGE 40-64: CPT | Performed by: NURSE PRACTITIONER

## 2024-06-04 PROCEDURE — 70450 CT HEAD/BRAIN W/O DYE: CPT

## 2024-06-04 PROCEDURE — 83735 ASSAY OF MAGNESIUM: CPT | Performed by: NURSE PRACTITIONER

## 2024-06-04 PROCEDURE — 85025 COMPLETE CBC W/AUTO DIFF WBC: CPT | Performed by: NURSE PRACTITIONER

## 2024-06-04 PROCEDURE — 2500000001 HC RX 250 WO HCPCS SELF ADMINISTERED DRUGS (ALT 637 FOR MEDICARE OP): Performed by: STUDENT IN AN ORGANIZED HEALTH CARE EDUCATION/TRAINING PROGRAM

## 2024-06-04 PROCEDURE — 71045 X-RAY EXAM CHEST 1 VIEW: CPT

## 2024-06-04 PROCEDURE — 71045 X-RAY EXAM CHEST 1 VIEW: CPT | Performed by: RADIOLOGY

## 2024-06-04 PROCEDURE — 84484 ASSAY OF TROPONIN QUANT: CPT | Performed by: NURSE PRACTITIONER

## 2024-06-04 PROCEDURE — 2500000001 HC RX 250 WO HCPCS SELF ADMINISTERED DRUGS (ALT 637 FOR MEDICARE OP): Performed by: NURSE PRACTITIONER

## 2024-06-04 PROCEDURE — 93005 ELECTROCARDIOGRAM TRACING: CPT

## 2024-06-04 PROCEDURE — 3050F LDL-C >= 130 MG/DL: CPT | Performed by: NURSE PRACTITIONER

## 2024-06-04 RX ORDER — AMLODIPINE BESYLATE 5 MG/1
5 TABLET ORAL ONCE
Status: COMPLETED | OUTPATIENT
Start: 2024-06-04 | End: 2024-06-04

## 2024-06-04 RX ORDER — AMLODIPINE BESYLATE 5 MG/1
5 TABLET ORAL DAILY
Qty: 30 TABLET | Refills: 1 | Status: SHIPPED | OUTPATIENT
Start: 2024-06-04 | End: 2024-06-04

## 2024-06-04 RX ORDER — AMLODIPINE BESYLATE 5 MG/1
10 TABLET ORAL DAILY
Qty: 60 TABLET | Refills: 1 | Status: SHIPPED | OUTPATIENT
Start: 2024-06-04 | End: 2024-08-03

## 2024-06-04 RX ORDER — METFORMIN HYDROCHLORIDE 500 MG/1
TABLET, EXTENDED RELEASE ORAL
Qty: 90 TABLET | Refills: 1 | Status: SHIPPED | OUTPATIENT
Start: 2024-06-04 | End: 2024-06-05 | Stop reason: SDUPTHER

## 2024-06-04 RX ORDER — INSULIN LISPRO 100 [IU]/ML
INJECTION, SOLUTION INTRAVENOUS; SUBCUTANEOUS
Qty: 15 ML | Refills: 1 | Status: SHIPPED | OUTPATIENT
Start: 2024-06-04

## 2024-06-04 RX ORDER — POTASSIUM CHLORIDE 1.5 G/1.58G
40 POWDER, FOR SOLUTION ORAL ONCE
Status: COMPLETED | OUTPATIENT
Start: 2024-06-04 | End: 2024-06-04

## 2024-06-04 RX ORDER — INSULIN GLARGINE 100 [IU]/ML
25 INJECTION, SOLUTION SUBCUTANEOUS NIGHTLY
Qty: 1 EACH | Refills: 2 | Status: SHIPPED | OUTPATIENT
Start: 2024-06-04 | End: 2024-06-11

## 2024-06-04 RX ADMIN — POTASSIUM CHLORIDE 40 MEQ: 1.5 POWDER, FOR SOLUTION ORAL at 14:11

## 2024-06-04 RX ADMIN — AMLODIPINE BESYLATE 5 MG: 5 TABLET ORAL at 16:22

## 2024-06-04 RX ADMIN — AMLODIPINE BESYLATE 5 MG: 5 TABLET ORAL at 12:42

## 2024-06-04 ASSESSMENT — LIFESTYLE VARIABLES
EVER HAD A DRINK FIRST THING IN THE MORNING TO STEADY YOUR NERVES TO GET RID OF A HANGOVER: NO
HAVE PEOPLE ANNOYED YOU BY CRITICIZING YOUR DRINKING: NO
HAVE YOU EVER FELT YOU SHOULD CUT DOWN ON YOUR DRINKING: NO
TOTAL SCORE: 0
EVER FELT BAD OR GUILTY ABOUT YOUR DRINKING: NO

## 2024-06-04 ASSESSMENT — PAIN SCALES - GENERAL: PAINLEVEL_OUTOF10: 0 - NO PAIN

## 2024-06-04 ASSESSMENT — PATIENT HEALTH QUESTIONNAIRE - PHQ9
2. FEELING DOWN, DEPRESSED OR HOPELESS: NEARLY EVERY DAY
2. FEELING DOWN, DEPRESSED OR HOPELESS: NEARLY EVERY DAY
2. FEELING DOWN, DEPRESSED OR HOPELESS: NOT AT ALL
1. LITTLE INTEREST OR PLEASURE IN DOING THINGS: NOT AT ALL
SUM OF ALL RESPONSES TO PHQ9 QUESTIONS 1 AND 2: 3
1. LITTLE INTEREST OR PLEASURE IN DOING THINGS: NOT AT ALL
SUM OF ALL RESPONSES TO PHQ9 QUESTIONS 1 AND 2: 0

## 2024-06-04 ASSESSMENT — COLUMBIA-SUICIDE SEVERITY RATING SCALE - C-SSRS
2. HAVE YOU ACTUALLY HAD ANY THOUGHTS OF KILLING YOURSELF?: NO
1. IN THE PAST MONTH, HAVE YOU WISHED YOU WERE DEAD OR WISHED YOU COULD GO TO SLEEP AND NOT WAKE UP?: NO
6. HAVE YOU EVER DONE ANYTHING, STARTED TO DO ANYTHING, OR PREPARED TO DO ANYTHING TO END YOUR LIFE?: NO

## 2024-06-04 ASSESSMENT — PAIN - FUNCTIONAL ASSESSMENT: PAIN_FUNCTIONAL_ASSESSMENT: 0-10

## 2024-06-04 NOTE — TELEPHONE ENCOUNTER
Pharmacy called to clarify metformin direction. Can you resend to them with dosage directions 1 tablet (500mg) 2 tablets (1000mg)

## 2024-06-04 NOTE — DISCHARGE INSTRUCTIONS
Blood pressure came down with amlodipine, please continue this daily.  Your amlodipine dose was increased to 10 mg daily.  This prescription was adjusted,  at your pharmacy.  Resume other normal medications.  Labs with the exception of mildly low potassium are all normal.  You were given potassium in the emergency department.  Chest x-ray and EKG are normal.  Your symptoms improved when your blood pressure came down.  You are discharged home to follow-up with PCP in the next 2 to 3 days.

## 2024-06-04 NOTE — ED PROVIDER NOTES
Limitations to History: None     HPI:      Kimberley Murillo is a 63 y.o. with significant past medical history hypertension, hypothyroidism, migraines, anemia and diabetes secondary to prolonged steroid use for giant cell arteritis presenting to ED today from PCP office for evaluation of elevated blood pressures.  Patient states she has been unable to tolerate several medications for hypertension, seen in PCP office today with elevated blood pressures, endorses headache with blurry vision while reading and intermittent nonradiating chest heaviness (none currently.)  Sent to ER for evaluation.  Denies fever/chills, cough/cold symptoms, shortness of breath, nausea/vomiting, abdominal pain, urinary symptoms, change in bowel habits or any other complaints.  No smoking, EtOH or drug use.  PCP is Asha JIMENEZ.    Additional History Obtained from: Office note Asha JIMENEZ from today    ------------------------------------------------------------------------------------------------------------------------------------------    VS: As documented in the triage note and EMR flowsheet from this visit were reviewed.    Physical Exam:  Gen: 63-year-old female, appears fatigued but nontoxic.  Awake and alert, oriented x 3.  Well-nourished and hydrated.  Head/Neck: NCAT, neck w/ FROM  Eyes: EOMI, PERRL, anicteric sclerae, noninjected conjunctivae  Ears: TMs clear b/l without sign of infection  Nose: Nares patent w/o rhinorrhea  Mouth:  MMM, no OP lesions noted  Heart: RRR no MRG  Lungs: CTA b/l no RRW, no increased work of breathing  Abdomen: soft, NT, ND, no HSM, no palpable masses  Musculoskeletal: ELVIS x 4.  MSPs intact.  Skin intact.  No deformities.  Neurologic: Alert, symmetrical facies, phonates clearly, moves all extremities equally, responsive to touch, ambulates normally   Skin: Pink, warm and dry.  No rashes  noted        ------------------------------------------------------------------------------------------------------------------------------------------    Medical Decision Makin y.o. with significant past medical history hypertension, hypothyroidism, migraines, anemia and diabetes secondary to prolonged steroid use for giant cell arteritis is evaluated the bedside for elevated blood pressures in the PCP office today.  Patient reports that they have tried multiple antihypertensives and she has been allergic to all of them.  Blood pressure on arrival to the /114, heart rate 85, patient is not hypoxic or febrile.  Complaining of a headache, intermittent blurry vision when reading and intermittent chest heaviness.  Differential includes but is not limited to intracranial abnormality, ACS and hypertensive urgency.  IV established, continuous cardiac/O2 sat monitoring.  Basic labs, EKG, chest x-ray and head CT will be performed.    ED Course as of 24 1644      1306 Interpreted by the Emergency Department Attending: ECG revealed normal sinus rhythm at a rate of 80 beats per minute with MO interval 146 , QRS of 92 , QTc of 435.  No acute injury pattern. Previous EKG on  revealed no significant changes.    [MG]   1428 Laboratory studies were reviewed, no leukocytosis or evidence of anemia.  Normal kidney function, LFTs and high-sensitivity troponin.  Potassium is low at 3.3, this will be repleted with oral potassium, all other electrolytes within normal limits.  Head CT shows no acute intracranial process.  Chest x-ray shows no acute cardiopulmonary process.  Blood pressure 186/89 with a heart rate of 86 after receiving 5 mg amlodipine p.o.  Patient feels improved, asymptomatic at this time.  Awaiting a delta troponin, anticipate discharge home continue amlodipine with PCP follow-up [SB]   1524 Repeat troponin is also within normal limits.  Patient remains asymptomatic.  Repeat  vital signs within normal limits.  Remains hemodynamically stable.  Final diagnosis today is hypokalemia and hypertension.  Discharge home, follow-up with PCP in the next 2 to 3 days.  Will continue to take amlodipine 5 mg daily.  Resume any normal medications.  Return precautions discussed.  Diagnosis, treatment and plan discussed with patient, she verbalizes understanding and is in agreement.  Condition stable for discharge. [SB]   1605 By the time nursing rechecked blood pressure for discharge, BP was running 197/99.  Patient states her headache is starting to come back however she has not eaten today and that usually causes headaches.  She is given a snack.  Patient will be given an additional 5 mg of amlodipine, if she is downtrending, she will be discharged home on amlodipine 10 mg daily for follow-up. [SB]   1643 Repeat blood pressure 172/82, patient will be discharged home, amlodipine changed to 10 mg daily, prescription is updated.  PCP updated.  Stable for discharge. [SB]      ED Course User Index  [MG] Nadir Ramirez DO  [SB] BARBARA Johnson         Diagnoses as of 06/04/24 1644   Hypertension, unspecified type   Hypokalemia       EKG interpreted by Dr. Ramirez    Chronic Medical Conditions Significantly Affecting Care: None    External Records Reviewed: I reviewed recent and relevant outside records including: None    Discussion of Management with Other Providers: Seen and evaluated with ED attending physician, Dr. Ramirez, he agrees with the treatment plan and final disposition of the patient.    I discussed the patient/results with: PCP updated by BARBARA Colin  06/04/24 1529       BARBARA Johnson  06/04/24 1648

## 2024-06-05 DIAGNOSIS — E11.9 TYPE 2 DIABETES MELLITUS WITHOUT COMPLICATION, WITHOUT LONG-TERM CURRENT USE OF INSULIN (MULTI): ICD-10-CM

## 2024-06-05 LAB
ATRIAL RATE: 80 BPM
P AXIS: 33 DEGREES
P OFFSET: 186 MS
P ONSET: 140 MS
PR INTERVAL: 146 MS
Q ONSET: 213 MS
QRS COUNT: 13 BEATS
QRS DURATION: 92 MS
QT INTERVAL: 378 MS
QTC CALCULATION(BAZETT): 435 MS
QTC FREDERICIA: 416 MS
R AXIS: 15 DEGREES
T AXIS: 33 DEGREES
T OFFSET: 402 MS
VENTRICULAR RATE: 80 BPM

## 2024-06-05 RX ORDER — METFORMIN HYDROCHLORIDE 500 MG/1
TABLET, EXTENDED RELEASE ORAL
Qty: 90 TABLET | Refills: 1 | Status: SHIPPED | OUTPATIENT
Start: 2024-06-05

## 2024-06-05 NOTE — TELEPHONE ENCOUNTER
"Pt called stating her BP is 219/99 she took 2 pills 10 mg her BP went down. She was feeling \"bad\" again and she took her BP and it was 247/116 , advised Asha Travis CNP- she instructed pt to head to the ER. Pt confirmed  "

## 2024-06-07 ASSESSMENT — ENCOUNTER SYMPTOMS
CHILLS: 0
FEVER: 0
NUMBNESS: 1
ACTIVITY CHANGE: 1
DIAPHORESIS: 0
APPETITE CHANGE: 1
UNEXPECTED WEIGHT CHANGE: 1
SHORTNESS OF BREATH: 0

## 2024-06-07 NOTE — ASSESSMENT & PLAN NOTE
Tried statin and zetia-- does not tolerate statin due to myalgias.  After 3 doses of zetia experienced hematuria which resolved after stopping medication

## 2024-06-07 NOTE — ASSESSMENT & PLAN NOTE
-managed on  metfomin, lantus, humalog-- follows w pharmacy regularly for dosage adjustment while tapering off of prednisone    -Ophthalmology follow up yearly in [place]   5/20/24: A1c 6.9

## 2024-06-07 NOTE — ASSESSMENT & PLAN NOTE
Managed by Dr MONISHA Lee   G[facility]-has large vessel involvement without cranial arteritis (had negative bilateral temporary biopsies).  -had right arm claudication since 12/23.  -Initial labs remarkable for ESR of 39 and CRP 6.11 (normal less than 1).  1/26/26 ct angio chest w/wout contrast- multifocal narrowing and vessel thickening in the right subclavian artery, including a long segment circumferential narrowing measuring 1.5 cm (with 75% narrowing).  There is abrupt narrowing and occlusion of the proximal right axillary artery.  There is mild to moderate narrowing of the distal left subclavian artery and mild to moderate narrowing of the proximal axillary artery on the left .  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   -2/2/2024 started prednisone 30 mg twice daily, tapered to 15 mg d on 5/20/24  Presumptive prednisone taper will be as follows:  May 20: 15 mg daily.  Ivette 3: 12.5 mg daily.  June 17: 10 mg daily.  July 1st: 7.5 mg daily  July 15: 5 mg daily.  July 29: 2.5 mg daily.  August 12: stop prednisone.  -cont on actemra 162 weekly (started 4/2024)  Will follow up w Vascular dr Tesfaye

## 2024-06-07 NOTE — ASSESSMENT & PLAN NOTE
- in past Trialed on hydrochlorothiazide for HTN, unable to tolerate due to SE of leg cramps.   - was on lisinopril 2.5 mg daily (started 9/15/23), stopped due to SE fatigue  Now very hypertensive: instructed to report to ED- pt declines   Start amlodipine 5 mg - pt very sensitive to medication in past therefore starting with only 5 mg -- still strongly advise ER visit.

## 2024-06-10 ASSESSMENT — ENCOUNTER SYMPTOMS
FATIGUE: 1
WEAKNESS: 1
POLYDIPSIA: 1
BLURRED VISION: 1
VISUAL CHANGE: 1

## 2024-06-11 ENCOUNTER — TELEPHONE (OUTPATIENT)
Dept: RHEUMATOLOGY | Facility: CLINIC | Age: 64
End: 2024-06-11

## 2024-06-11 ENCOUNTER — TELEMEDICINE (OUTPATIENT)
Dept: PHARMACY | Facility: HOSPITAL | Age: 64
End: 2024-06-11
Payer: COMMERCIAL

## 2024-06-11 DIAGNOSIS — R73.9 HYPERGLYCEMIA: ICD-10-CM

## 2024-06-11 DIAGNOSIS — E11.9 TYPE 2 DIABETES MELLITUS WITHOUT COMPLICATION, WITHOUT LONG-TERM CURRENT USE OF INSULIN (MULTI): ICD-10-CM

## 2024-06-11 RX ORDER — INSULIN GLARGINE 100 [IU]/ML
20 INJECTION, SOLUTION SUBCUTANEOUS NIGHTLY
Qty: 15 ML | Refills: 2 | Status: SHIPPED | OUTPATIENT
Start: 2024-06-11

## 2024-06-11 NOTE — PROGRESS NOTES
Patient is sent at the request of Kelsey Lombardi,* for my opinion regarding Type 2 diabetes.  My final recommendations will be communicated back to the requesting provider by way of shared medical record.    Subjective     Diabetes  She presents for her follow-up diabetic visit. She has type 2 diabetes mellitus. The initial diagnosis of diabetes was made 1 year ago. Her disease course has been improving. There are no hypoglycemic associated symptoms. Associated symptoms include blurred vision, fatigue, polydipsia, polyuria, visual change and weakness. There are no hypoglycemic complications. Symptoms are stable. There are no diabetic complications. Risk factors for coronary artery disease include diabetes mellitus, dyslipidemia and hypertension (on steroid therapy). Current diabetic treatment includes oral agent (monotherapy) and insulin injections. She is compliant with treatment all of the time. She is currently taking insulin pre-dinner and at bedtime. Insulin injections are given by significant other. Rotation sites for injection include the abdominal wall and thighs. She is following a generally healthy diet. She rarely participates in exercise. Her overall blood glucose range is 130-140 mg/dl. An ACE inhibitor/angiotensin II receptor blocker is not being taken. She does not see a podiatrist.Eye exam is current.     At last pharmacy encounter patient was instructed to reduce her Lantus down to 25 units daily with reduced prednisone dose starting on 06/03/24 and her Humalog was reduced to just 10 units with dinner (stopped her breakfast dose). Her metformin was also changed to 1 tablet 3 times a day with meals instead of taking 1 tablet in mid-day and 2 tablets with dinner as she reported stomach upset when taking 2 tablets at the same time.     She reduced down to 12.5 mg of prednisone daily on 06/03/24. We are following-up today to see how she has tolerated the reduction in insulin and prednisone dose  and to see if she needs and more adjustments to her diabetic regimen as her steroids decrease.     She saw Dr. Elder last on 05/20/2024 and her presumptive prednisone taper was planned:   June 17th: 10 mg daily   July 1st: 7.5 mg daily  July 15th: 5 mg daily  July 29th: 2.5 mg daily  August 12: stop prednisone     Of note patient has also been in the ED twice over the past couple weeks for elevated blood pressure. When she was in the ED in [place] they checked the blood pressure reading in her arms and it was in normal range. She does have a blood pressure cuff at home that she uses to monitor. If her blood pressure in the morning is in normal range she will skip the amlodipine. Has an appointment scheduled on 07/01/24 with a cardiologist at [facility].     Past Medical History:  She has a past medical history of Headache, unspecified, Personal history of diseases of the blood and blood-forming organs and certain disorders involving the immune mechanism, and Personal history of pneumonia (recurrent).    Past Surgical History:  She has a past surgical history that includes Other surgical history (11/03/2022); Other surgical history (11/03/2022); Other surgical history (11/03/2022); Other surgical history (11/03/2022); Other surgical history (11/03/2022); Other surgical history (11/03/2022); Other surgical history (11/03/2022); and BI stereotactic guided breast right localization and biopsy (Right, 12/18/2023).    Social History:  She reports that she has never smoked. She has never used smokeless tobacco. She reports that she does not currently use alcohol. She reports that she does not use drugs.    Family History:  No family history on file.    Allergies:  Meloxicam, Salsalate, Glimepiride, Statins-hmg-coa reductase inhibitors, Adhesive, and Latex    Current diet:   Breakfast: 1 piece of multi grain whole wheat bread with organic peanut butter and a little cinnamon; 1/2 cup of coffee with cream (states it is  plain but not flavored) or black tea with cinnamon   States she has to take bread with her prednisone to avoid stomach upset   Lunch: if at home she will have a protein with vegetable (hamburger with zucchini, green beans, and spinach today), or a protein drink (if she's working) - 5 grams of carbs per protein drink   Dinner: tweaks what she makes the rest of her household, will use low-carb wraps with protein and vegetables (grilled chicken)   Snacks: nuts, beef sticks throughout the day   Fluids: water with lemon, occasionally with crystal light; does not use artificial sweeteners   States she avoids processed foods  Keeps protein shake on her if she is not eating a lot throughout the day     Current exercise:   No resistance or strength training  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 or will walk around the store   Has been trying to walk more after dinner    The patient does have a known family history of diabetes (father, mother)     Patient is using: glucometer and continuous glucose monitor  Full 14-day report attached to encounter today       Hypoglycemia frequency: 2%  This is typically her fasting reading in the morning  Hypoglycemia awareness: Yes     Adverse Effects: none reported     Objective     Diabetes Pharmacotherapy:  Metformin  mg 1 tablet twice daily (will take a 3rd tablet if her blood sugar is still significantly elevated after dinner)  Insulin glargine (Lantus Solostar) 25 units daily at bedtime   Insulin lispro (Humalog Kwikpen) 10 units daily with dinner     Historical Diabetes Pharmacotherapy:   Glimepiride 1 mg (patient reported headache, stomach upset, and generally not feeling good while on it and wanted to stop it)    Primary/Secondary Prevention   Statin? No  ACE-I/ARB? No  Aspirin? No    Pertinent PMH Review:  PMH of Pancreatitis: No  PMH of Retinopathy: No  PMH of Urinary Tract Infections: No  PMH of MTC: No  PMH of MEN 2: No     Lab Review  Lab Results   Component Value  "Date    BILITOT 0.7 06/04/2024    CALCIUM 10.2 06/04/2024    CO2 29 06/04/2024     06/04/2024    CREATININE 0.65 06/04/2024    GLUCOSE 81 06/04/2024    ALKPHOS 47 06/04/2024    K 3.3 (L) 06/04/2024    PROT 6.7 06/04/2024     06/04/2024    AST 19 06/04/2024    ALT 32 06/04/2024    BUN 13 06/04/2024    ANIONGAP 10 06/04/2024    MG 2.00 06/04/2024    ALBUMIN 4.6 06/04/2024    GFRF >90 05/22/2023     Lab Results   Component Value Date    TRIG 135 05/14/2024    CHOL 256 (H) 05/14/2024    LDLCALC 140 (H) 05/14/2024    HDL 88.9 05/14/2024     Lab Results   Component Value Date    HGBA1C 6.9 (A) 05/20/2024    HGBA1C 7.8 (H) 12/29/2023    HGBA1C 6.6 (A) 05/22/2023     No components found for: \"UACR\"  The 10-year ASCVD risk score (Nathan MIRAMONTES, et al., 2019) is: 14.5%    Values used to calculate the score:      Age: 63 years      Sex: Female      Is Non- : No      Diabetic: Yes      Tobacco smoker: No      Systolic Blood Pressure: 172 mmHg      Is BP treated: Yes      HDL Cholesterol: 88.9 mg/dL      Total Cholesterol: 256 mg/dL    Health Maintenance:   Foot Exam: None, patient states that Dr. Elder did look at her feet in-office recently   Eye Exam: 02/20/24  Lipid Panel:  mg/dL,  mg/dL 05/14/24  Urine Albumin: None   Influenza Immunization: does not get the flu shot   Pneumonia Immunization: none     Drug Interactions:  No significant drug-drug interactions exist requiring adjustment to medication therapy.     Assessment/Plan   Problem List Items Addressed This Visit       Type 2 diabetes mellitus without complication, without long-term current use of insulin (Multi)     Kimberley Murillo has controlled type 2 diabetes mellitus complicated by steroid use and hypertension as evidenced by her most recent A1c of 6.9% on 05/20/24 which had decreased from 7.8% on 12/29/24. Her time in range with her CGM system has been 80% over the past 14 days which is above goal of >70%. She has " tolerated the insulin reduction well however has had hypoglycemic episodes when waking in the morning.   CHANGE: Lantus Solostar 100 unit/mL 20 units under the skin once daily at bedtime, reduce further down to 15 units once daily at bedtime on 06/17/24 effective with next prednisone dose decrease  CONTINUE:   Humalog Kwikpen 100 unit/mL 10 units under the skin before dinner  Metformin  mg 1 tablet by mouth twice daily (patient taking a 3rd tablet after dinner if her blood sugars are still significantly elevated but if they come down with the insulin she will only take the one tablet with dinner)  Her prednisone dose will decrease down to 10 mg daily on 06/17/24.   She was encouraged to keep her PCP Asha Travis updated regarding her blood pressure. Patient today reported that if her blood pressure is fine in the morning she will skip her amlodipine dose as it has dropped her blood pressure too low.   We will follow-up in 2 weeks to discuss further insulin reduction with reduced prednisone dose. She was encouraged to reach out with any questions and/or concerns prior to our next follow-up.          Relevant Medications    insulin glargine (Lantus Solostar U-100 Insulin) 100 unit/mL (3 mL) pen    Other Relevant Orders    Follow Up In [facility]     Other Visit Diagnoses       Hyperglycemia        Relevant Medications    insulin glargine (Lantus Solostar U-100 Insulin) 100 unit/mL (3 mL) pen    Other Relevant Orders    Follow Up In [facility]          Pharmacy Follow-Up: 06/25/2024  Physician Follow-Up: 07/03/2024    Steven Gilbert PharmD     Continue all meds under the continuation of care with the referring provider and clinical pharmacy team.

## 2024-06-11 NOTE — TELEPHONE ENCOUNTER
Patient called is suppposted to be taking amlodipine 5 mg twice a day. She states that blood pressure is normal in morning then starts to rise in the afternoon. So she takes one pill a day now at about 3 pm. She is not taking 2 a day

## 2024-06-11 NOTE — ASSESSMENT & PLAN NOTE
Kimberley Murillo has controlled type 2 diabetes mellitus complicated by steroid use and hypertension as evidenced by her most recent A1c of 6.9% on 05/20/24 which had decreased from 7.8% on 12/29/24. Her time in range with her CGM system has been 80% over the past 14 days which is above goal of >70%. She has tolerated the insulin reduction well however has had hypoglycemic episodes when waking in the morning.   CHANGE: Lantus Solostar 100 unit/mL 20 units under the skin once daily at bedtime, reduce further down to 15 units once daily at bedtime on 06/17/24 effective with next prednisone dose decrease  CONTINUE:   Humalog Kwikpen 100 unit/mL 10 units under the skin before dinner  Metformin  mg 1 tablet by mouth twice daily (patient taking a 3rd tablet after dinner if her blood sugars are still significantly elevated but if they come down with the insulin she will only take the one tablet with dinner)  Her prednisone dose will decrease down to 10 mg daily on 06/17/24.   She was encouraged to keep her PCP Asha Travis updated regarding her blood pressure. Patient today reported that if her blood pressure is fine in the morning she will skip her amlodipine dose as it has dropped her blood pressure too low.   We will follow-up in 2 weeks to discuss further insulin reduction with reduced prednisone dose. She was encouraged to reach out with any questions and/or concerns prior to our next follow-up.

## 2024-06-14 ENCOUNTER — TELEPHONE (OUTPATIENT)
Dept: PHARMACY | Facility: HOSPITAL | Age: 64
End: 2024-06-14
Payer: COMMERCIAL

## 2024-06-14 NOTE — TELEPHONE ENCOUNTER
Patient reached out to let me know that her fasting blood sugar has increased higher (running in the 150s-160s more) since reducing her Lantus down to 20 units from 25 units once daily on Tuesday 06/11. She reports she took a 3rd tablet of Metformin in the morning the past 2 days to help her blood sugars but she was unable to tolerate the 2 tablets taken together due to stomach upset.     She will increase her Lantus up to 22 units once daily with the plan still to decrease down to 15 units with reduced prednisone dose on Monday 06/17/24. She will reach out to me if her blood sugars start to increase up after that reduction as well.     She will reach out with any other questions and/or concerns.     Thank you,     Steven Gilbert, PharmD

## 2024-06-17 ENCOUNTER — TELEPHONE (OUTPATIENT)
Dept: PRIMARY CARE | Facility: CLINIC | Age: 64
End: 2024-06-17
Payer: COMMERCIAL

## 2024-06-17 NOTE — TELEPHONE ENCOUNTER
----- Message from BARBARA Mccracken sent at 6/17/2024 10:06 AM EDT -----  For brina klein  ----- Message -----  From: Isa Schneider  Sent: 6/17/2024   9:39 AM EDT  To: BARBARA Mccracken    Which pt?  ----- Message -----  From: BARBARA Mccracken  Sent: 6/17/2024   9:33 AM EDT  To: Isa Schneider    Please call patient to schedule an appt for HTN follow up in July

## 2024-06-24 ENCOUNTER — HOSPITAL ENCOUNTER (OUTPATIENT)
Dept: RADIOLOGY | Facility: HOSPITAL | Age: 64
Discharge: HOME | End: 2024-06-24
Payer: COMMERCIAL

## 2024-06-24 ENCOUNTER — OFFICE VISIT (OUTPATIENT)
Dept: SURGICAL ONCOLOGY | Facility: HOSPITAL | Age: 64
End: 2024-06-24
Payer: COMMERCIAL

## 2024-06-24 VITALS — BODY MASS INDEX: 32.95 KG/M2 | TEMPERATURE: 97.5 F | WEIGHT: 198 LBS

## 2024-06-24 DIAGNOSIS — R92.8 ABNORMAL FINDING ON BREAST IMAGING: ICD-10-CM

## 2024-06-24 DIAGNOSIS — R92.8 ABNORMAL FINDING ON BREAST IMAGING: Primary | ICD-10-CM

## 2024-06-24 DIAGNOSIS — N64.4 BREAST PAIN: ICD-10-CM

## 2024-06-24 PROBLEM — Z12.39 BREAST CANCER SCREENING, HIGH RISK PATIENT: Status: ACTIVE | Noted: 2024-06-24

## 2024-06-24 PROBLEM — Z12.39 BREAST CANCER SCREENING, HIGH RISK PATIENT: Status: RESOLVED | Noted: 2024-06-24 | Resolved: 2024-06-24

## 2024-06-24 PROCEDURE — 77061 BREAST TOMOSYNTHESIS UNI: CPT | Mod: RT

## 2024-06-24 PROCEDURE — 99213 OFFICE O/P EST LOW 20 MIN: CPT | Performed by: NURSE PRACTITIONER

## 2024-06-24 PROCEDURE — 76642 ULTRASOUND BREAST LIMITED: CPT | Mod: RIGHT SIDE | Performed by: STUDENT IN AN ORGANIZED HEALTH CARE EDUCATION/TRAINING PROGRAM

## 2024-06-24 PROCEDURE — 1036F TOBACCO NON-USER: CPT | Performed by: NURSE PRACTITIONER

## 2024-06-24 PROCEDURE — 76642 ULTRASOUND BREAST LIMITED: CPT | Mod: RT

## 2024-06-24 PROCEDURE — 3008F BODY MASS INDEX DOCD: CPT | Performed by: NURSE PRACTITIONER

## 2024-06-24 PROCEDURE — 3050F LDL-C >= 130 MG/DL: CPT | Performed by: NURSE PRACTITIONER

## 2024-06-24 PROCEDURE — 77061 BREAST TOMOSYNTHESIS UNI: CPT | Mod: RIGHT SIDE | Performed by: STUDENT IN AN ORGANIZED HEALTH CARE EDUCATION/TRAINING PROGRAM

## 2024-06-24 PROCEDURE — 76982 USE 1ST TARGET LESION: CPT | Mod: RT

## 2024-06-24 PROCEDURE — 77065 DX MAMMO INCL CAD UNI: CPT | Mod: RIGHT SIDE | Performed by: STUDENT IN AN ORGANIZED HEALTH CARE EDUCATION/TRAINING PROGRAM

## 2024-06-24 ASSESSMENT — ENCOUNTER SYMPTOMS
WEAKNESS: 1
BLURRED VISION: 1
VISUAL CHANGE: 1
FATIGUE: 1
POLYDIPSIA: 1

## 2024-06-24 ASSESSMENT — PAIN SCALES - GENERAL: PAINLEVEL: 0-NO PAIN

## 2024-06-24 NOTE — PROGRESS NOTES
Patient is sent at the request of Kelsey Lombardi,* for my opinion regarding Type 2 diabetes.  My final recommendations will be communicated back to the requesting provider by way of shared medical record.    Subjective     Diabetes  She presents for her follow-up diabetic visit. She has type 2 diabetes mellitus. The initial diagnosis of diabetes was made 1 year ago. Her disease course has been improving. There are no hypoglycemic associated symptoms. Associated symptoms include blurred vision, fatigue, polydipsia, polyuria, visual change and weakness. There are no hypoglycemic complications. Symptoms are stable. There are no diabetic complications. Risk factors for coronary artery disease include diabetes mellitus, dyslipidemia and hypertension (on steroid therapy). Current diabetic treatment includes oral agent (monotherapy) and insulin injections. She is compliant with treatment all of the time. She is currently taking insulin pre-dinner and at bedtime. Insulin injections are given by significant other. Rotation sites for injection include the abdominal wall and thighs. She is following a generally healthy diet. She rarely participates in exercise. Her overall blood glucose range is 130-140 mg/dl. An ACE inhibitor/angiotensin II receptor blocker is not being taken. She does not see a podiatrist.Eye exam is current.     At last pharmacy encounter patient was instructed to reduce her Lantus down to 20 units daily after that encounter and then reduce down further to 15 units daily with her reduced prednisone dose on 06/17/24. She reached out to me on 06/14/24 to let me know that her blood sugars were running higher so she was instructed to increase up to 22 units daily. She reports today she reduced her dose down to 20 units with reduced prednisone dose on 06/17/24.     She reduced down to 10 mg of prednisone daily on 06/17/24 and will reduce down to 7.5 mg daily on 07/01/24.     We are following-up today to  see how her blood sugars have been since reducing both prednisone and Lantus on 06/17/24 and to determine plan for her next prednisone reduction.     She saw Dr. Elder last on 05/20/2024 and her presumptive prednisone taper was planned:   July 1st: 7.5 mg daily  July 15th: 5 mg daily  July 29th: 2.5 mg daily  August 12: stop prednisone     Past Medical History:  She has a past medical history of Headache, unspecified, Personal history of diseases of the blood and blood-forming organs and certain disorders involving the immune mechanism, and Personal history of pneumonia (recurrent).    Past Surgical History:  She has a past surgical history that includes Other surgical history (11/03/2022); Other surgical history (11/03/2022); Other surgical history (11/03/2022); Other surgical history (11/03/2022); Other surgical history (11/03/2022); Other surgical history (11/03/2022); Other surgical history (11/03/2022); BI stereotactic guided breast right localization and biopsy (Right, 12/18/2023); and Breast biopsy (Right).    Social History:  She reports that she has never smoked. She has never used smokeless tobacco. She reports that she does not currently use alcohol. She reports that she does not use drugs.    Family History:  No family history on file.    Allergies:  Meloxicam, Salsalate, Glimepiride, Statins-hmg-coa reductase inhibitors, Adhesive, and Latex    Current diet:   Breakfast: 1 piece of multi grain whole wheat bread with organic peanut butter and a little cinnamon; 1/2 cup of coffee with cream (states it is plain but not flavored) or black tea with cinnamon   States she has to take bread with her prednisone to avoid stomach upset   Lunch: if at home she will have a protein with vegetable (hamburger with zucchini, green beans, and spinach today), or a protein drink (if she's working) - 5 grams of carbs per protein drink   Dinner: tweaks what she makes the rest of her household, will use low-carb wraps with  protein and vegetables (grilled chicken)   Snacks: nuts, beef sticks throughout the day   Fluids: water with lemon, occasionally with crystal light; does not use artificial sweeteners   States she avoids processed foods  Keeps protein shake on her if she is not eating a lot throughout the day     Current exercise:   No resistance or strength training  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 or will walk around the store   Has been trying to walk more after dinner  Today notes that her exercise has been continuing to increase     The patient does have a known family history of diabetes (father, mother)     Patient is using: glucometer and continuous glucose monitor  Full 14-day report attached to encounter today       Hypoglycemia frequency: 1%  Notes that her hypoglycemic episode was due to being outside working and putting off eating   Hypoglycemia awareness: Yes     Adverse Effects: none reported     Objective     Diabetes Pharmacotherapy:  Metformin  mg 1 tablet twice daily (will take a 3rd tablet if her blood sugar is still significantly elevated after dinner)  Insulin glargine (Lantus Solostar) 20 units daily at bedtime   Insulin lispro (Humalog Kwikpen) 10 units daily with dinner     Historical Diabetes Pharmacotherapy:   Glimepiride 1 mg (patient reported headache, stomach upset, and generally not feeling good while on it and wanted to stop it)    Primary/Secondary Prevention   Statin? No  ACE-I/ARB? No  Aspirin? No    Pertinent PMH Review:  PMH of Pancreatitis: No  PMH of Retinopathy: No  PMH of Urinary Tract Infections: No  PMH of MTC: No  PMH of MEN 2: No     Lab Review  Lab Results   Component Value Date    BILITOT 0.7 06/04/2024    CALCIUM 10.2 06/04/2024    CO2 29 06/04/2024     06/04/2024    CREATININE 0.65 06/04/2024    GLUCOSE 81 06/04/2024    ALKPHOS 47 06/04/2024    K 3.3 (L) 06/04/2024    PROT 6.7 06/04/2024     06/04/2024    AST 19 06/04/2024    ALT 32 06/04/2024    BUN 13  "06/04/2024    ANIONGAP 10 06/04/2024    MG 2.00 06/04/2024    ALBUMIN 4.6 06/04/2024    GFRF >90 05/22/2023     Lab Results   Component Value Date    TRIG 135 05/14/2024    CHOL 256 (H) 05/14/2024    LDLCALC 140 (H) 05/14/2024    HDL 88.9 05/14/2024     Lab Results   Component Value Date    HGBA1C 6.9 (A) 05/20/2024    HGBA1C 7.8 (H) 12/29/2023    HGBA1C 6.6 (A) 05/22/2023     No components found for: \"UACR\"  The 10-year ASCVD risk score (Nathan MIRAMONTES, et al., 2019) is: 14.5%    Values used to calculate the score:      Age: 63 years      Sex: Female      Is Non- : No      Diabetic: Yes      Tobacco smoker: No      Systolic Blood Pressure: 172 mmHg      Is BP treated: Yes      HDL Cholesterol: 88.9 mg/dL      Total Cholesterol: 256 mg/dL    Health Maintenance:   Foot Exam: None  Eye Exam: 02/20/24, will go for an updated exam 07/03  Lipid Panel:  mg/dL,  mg/dL 05/14/24  Urine Albumin: None   Influenza Immunization: does not get the flu shot   Pneumonia Immunization: none     Drug Interactions:  No significant drug-drug interactions exist requiring adjustment to medication therapy.     Assessment/Plan   Problem List Items Addressed This Visit       Type 2 diabetes mellitus without complication, without long-term current use of insulin (Multi) - Primary     Kimberley Murillo has controlled type 2 diabetes mellitus complicated by steroid use and hypertension as evidenced by her most recent A1c of 6.9% on 05/20/24 which had decreased from 7.8% on 12/29/24. Her time in range with her CGM system has been 84% over the past 14 days which is above goal of >70%. She has continued to tolerate the reductions in insulin well and reports that her hypoglycemia episode was due to her being outside working and putting off eating. We will continue working on reducing her insulin doses while she decreases her prednisone dose.   CHANGE:  Humalog Kwikpen 100 unit/mL 8 units under the skin before dinner " (reduced from 10 units)  Lantus Solostar 100 unit/mL 15 units under the skin once daily at bedtime (effective with reduced prednisone dose 07/01, patient will continue 20 units daily through the rest of this week)  CONTINUE:   Metformin  mg 1 tablet by mouth twice daily (may take a 3rd tablet after dinner if blood sugars are still significantly elevated)  Her prednisone dose will reduce down to 7.5 mg daily on 07/01/24.   She has continued to watch what she is eating and notes that her exercise has continued to increase.   Compliance at present is estimated to be excellent.   Medications are dosed appropriately per most recent renal and hepatic function.   We will follow-up in 2 weeks to see how she has tolerated the insulin and prednisone dose reductions and to discuss reducing down her insulin doses further as her prednisone dose is tapered off. She was encouraged to reach out with any questions and/or concerns prior to our next follow-up.          Relevant Orders    Follow Up In [facility] - Pharmacy     Other Visit Diagnoses       Hyperglycemia              Pharmacy Follow-Up: 07/09/2024   Rheumatology Follow-Up: 07/03/2024  PCP Follow-Up: 09/04/2024    Steven Gilbert PharmD     Continue all meds under the continuation of care with the referring provider and clinical pharmacy team.

## 2024-06-24 NOTE — PROGRESS NOTES
[facility] CANCER [place]  Kimberley Murillo female   1960 63 y.o.  [id number]      Chief Complaint  Follow up abnormal mammogram.    History Of Present Illness  Kimberley Murillo is a 63 y.o.  female followed in the breast center for probably benign right breast findings. She has a history of bilateral benign core biopsies at least five total and excision. She has two paternal aunts with breast cancer, 40's. She presents today for follow up imaging and exam. She does have constant pain to the right breast when she puts a bra on. She has tried a bigger size but then it doesn't support her left breast.     BREAST IMAGIN2024 Right breast diagnostic mammogram with ultrasound, indicates BI-RADS Category 3. Stable biopsy proven benign right breast architectural distortion. Given the history of a remote benign right breast excisional biopsy,  this is consistent with a benign postsurgical scar. No additional imaging follow-up for this finding is required. Stable probably benign right breast mass at the 9 o'clock position documenting 18 months of stability. A final six-month follow-up ultrasound coinciding with the patient's next annual bilateral mammogram is recommended to document stability. The right breast mass at 10:00 is now considered benign given the interval decrease in size. No additional imaging follow-up for this finding is required.    REPRODUCTIVE HISTORY: menarche age 17, , first birth age 30, did not breastfeed, OCP's x 3 years, natural menopause age 60, no HRT, scattered fibroglandular tissue    FAMILY CANCER HISTORY:   Paternal Aunt (1): Breast cancer, 40's  Paternal Aunt (2): Breast cancer, 40's    Review of Systems  Constitutional:  Negative for appetite change, fatigue, fever and unexpected weight change.   HENT:  Negative for ear pain, hearing loss, nosebleeds, sore throat and trouble swallowing.    Eyes:  Negative for discharge, itching and visual disturbance.   Breast: As stated in  HPI.  Respiratory:  Negative for cough, chest tightness and shortness of breath.    Cardiovascular:  Negative for chest pain, palpitations and leg swelling.   Gastrointestinal:  Negative for abdominal pain, constipation, diarrhea and nausea.   Endocrine: Negative for cold intolerance and heat intolerance.   Genitourinary:  Negative for dysuria, frequency, hematuria, pelvic pain and vaginal bleeding.   Musculoskeletal:  Negative for arthralgias, back pain, gait problem, joint swelling and myalgias.   Skin:  Negative for color change and rash.   Allergic/Immunologic: Negative for environmental allergies and food allergies.   Neurological:  Negative for dizziness, tremors, speech difficulty, weakness, numbness and headaches.   Hematological:  Does not bruise/bleed easily.   Psychiatric/Behavioral:  Negative for agitation, dysphoric mood and sleep disturbance. The patient is not nervous/anxious.       Past Medical History  She has a past medical history of Headache, unspecified, Personal history of diseases of the blood and blood-forming organs and certain disorders involving the immune mechanism, and Personal history of pneumonia (recurrent).    Surgical History  She has a past surgical history that includes Other surgical history (11/03/2022); Other surgical history (11/03/2022); Other surgical history (11/03/2022); Other surgical history (11/03/2022); Other surgical history (11/03/2022); Other surgical history (11/03/2022); Other surgical history (11/03/2022); BI stereotactic guided breast right localization and biopsy (Right, 12/18/2023); and Breast biopsy (Right).    Family History  Cancer-related family history is not on file.     Social History  She reports that she has never smoked. She has never used smokeless tobacco. She reports that she does not currently use alcohol. She reports that she does not use drugs.    Allergies  Meloxicam, Salsalate, Glimepiride, Statins-hmg-coa reductase inhibitors, Adhesive, and  "Latex    Medications  Current Outpatient Medications   Medication Instructions    Actemra ACTPen 162 mg, subcutaneous, Every 7 days    amLODIPine (NORVASC) 10 mg, oral, Daily    blood-glucose sensor (FreeStyle Chava 3 Sensor) device APPLY 1 SENSOR TO THE BACK OF THE ARM FOR CONTINUOUS BLOOD SUGAR MONITORING. REMOVE SENSOR AND REPLACE WITH NEW SENSOR EVERY 14 DAYS.    calcium carb and citrate-vitD3 600 mg-12.5 mcg (500 unit) tablet extended release 1 tablet, oral, 2 times daily    insulin lispro (HumaLOG KwikPen Insulin) 100 unit/mL injection 10 units with dinner    Lantus Solostar U-100 Insulin 20 Units, subcutaneous, Nightly    metFORMIN XR (Glucophage-XR) 500 mg 24 hr tablet Take 1 tablets (500 mg) by mouth in the morning and 2 tablet (1000 mg) by mouth in the afternoon. Do not crush, chew, or split.    pen needle, diabetic 32 gauge x 5/32\" needle Use 1 pen needle with insulin injections up to 5 times daily    predniSONE (Deltasone) 10 mg tablet TAKE 3 TABLETS BY MOUTH daily    predniSONE (DELTASONE) 12.5 mg, oral, Daily    Unithroid 125 mcg, oral, Daily       Last Recorded Vitals  Temperature 36.4 °C (97.5 °F), weight 89.8 kg (198 lb).      Physical Exam  Patient is alert and oriented x3 and in a relaxed and appropriate mood. Her gait is steady and hand grasps are equal. Sclera is clear. The breasts are nearly symmetrical, large, and pendulous. The tissue is soft without palpable abnormalities, discrete nodules or masses. The skin and nipples appear normal. There is no cervical, supraclavicular or axillary lymphadenopathy. Heart rate and rhythm normal, S1 and S2 appreciated. The lungs are clear to auscultation bilaterally. Abdomen is soft and non-tender.      Relevant Results and Imaging  Study Result    Narrative & Impression   Interpreted By:  Wilian Mcgovern and Avery Ross   STUDY:  BI MAMMO RIGHT DIAGNOSTIC TOMOSYNTHESIS; BI US BREAST LIMITED RIGHT;  6/24/2024 1:15 pm; 6/24/2024 2:16 pm      ACCESSION " NUMBER(S):  [id number]; [id number]      ORDERING CLINICIAN:  REAGAN SHEFFIELD      INDICATION:  Six-month follow-up of a biopsy proven benign right breast  architectural distortion and multiple probably benign right breast  masses. History of benign right breast core biopsies and remote right  breast excisional biopsy.      COMPARISON:  12/18/2023, 12/05/2023, 10/10/2022, 09/01/2022, 12/04/2014.      FINDINGS:  MAMMOGRAPHY: 2D and tomosynthesis images were reviewed at 1 mm slice  thickness.      Density:  There are areas of scattered fibroglandular tissue.      The biopsy proven benign architectural distortion in the  superolateral right breast at anterior depth is stable. This  corresponds with the patient's benign post excisional biopsy  scarring. Two additional tissue markers are seen in the superolateral  right breast at middle depth. No new suspicious masses or  calcifications are identified.      ULTRASOUND:  A targeted ultrasound of the right breast was performed  by a registered sonographer using elastography.      The mass at the 10 o'clock position 11 cm from the nipple now  measures 0.8 x 0.4 x 0.5 cm compared to 0.8 x 0.5 x 1.1 cm. This is  now considered benign given the interval decrease in size.      A postsurgical scar is again seen at the 9 o'clock position 11 cm  from the nipple. This is considered benign and likely corresponds  with mammographic architectural distortion.      The mass at the 9 o'clock position 9 cm from the nipple measures 0.9  x 0.5 x 0.5 cm compared to 0.8 x 0.5 x 0.6 cm. It is avascular and  soft on elastography.      IMPRESSION:  1. Stable biopsy proven benign right breast architectural distortion.  Given the history of a remote benign right breast excisional biopsy,  this is consistent with a benign postsurgical scar. No additional  imaging follow-up for this finding is required.  2. Stable probably benign right breast mass at the 9 o'clock position  documenting 18 months of  stability. A final six-month follow-up  ultrasound coinciding with the patient's next annual bilateral  mammogram is recommended to document stability.  3. The right breast mass at 10:00 is now considered benign given the  interval decrease in size. No additional imaging follow-up for this  finding is required.      BI-RADS CATEGORY:      BI-RADS Category:  3 Probably Benign.  Recommendation:  Short-term Interval Follow-up Imaging.  Recommended Date:  6 Months.  Laterality:  Right.      For any future breast imaging appointments, please call [phone] ([phone]).      I personally reviewed the images/study and I agree with the findings  as stated by fellow physician, Dr. Ren Licona.          MACRO:  None      Signed by: Wilian Mcgovern 6/24/2024 2:58 PM       Time was spent viewing digital images. I explained the results in depth, along with suggested explanation for follow up recommendations based on the testing results. BI-RADS Category 3    Visit Diagnosis  1. Abnormal finding on breast imaging  Clinic Appointment Request Follow Up    BI mammo bilateral diagnostic tomosynthesis    BI US breast limited right      2. Breast pain            Assessment/Plan  Abnormal mammogram, probably benign right breast mass, right breast pain, right breast benign core biopsy, benign bilateral core biopsies with cyst excisions, family history of breast cancer, scattered fibroglandular tissue    Plan:  Return December 2024 for bilateral diagnostic mammogram with right breast ultrasound and office visit.   Debbie:  lifetime risk 14.3%   Risk model indicates you are considered to be at average risk for developing breast cancer    Patient Discussion/Summary  Your breast imaging was a BI-RADS category 3. This means the findings on imaging are probably benign, but still require short term follow up to monitor stability. The chances of the findings in this category being a cancer are extremely low, less than 1-2%. You will likely  need repeat follow up imaging in 6-12 month intervals for up to 2 years until the findings are known to be stable or resolve. This eliminates unnecessary biopsies and allows for early diagnosis should the area change over time.    Your clinical exam was stable. Please return in 6 months for follow up imaging and exam, or sooner if you have any problems or concerns.          IMPORTANT INFORMATION REGARDING YOUR RESULTS    If you receive medical information from My [facility] Personal Health Record (online chart) your results will be released into your chart. This means you may view or see results of your biopsy or procedure before I contact you directly. If this occurs, please call the office and we will discuss your results over the phone.    You can see your health information, review clinical summaries from office visits & test results online when you follow your health with MY  Chart, a personal health record. To sign up go to [email or web address]. If you need assistance with signing up or trouble getting into your account call [employer] Patient Line 24/7 at [phone].    My office phone number is [phone]  if you need to get in touch with me or have additional questions or concerns. Thank you for choosing [facility] and trusting me as your healthcare provider. I look forward to seeing you again at your next office visit. I am honored to be a provider on your health care team and I remain dedicated to helping you achieve your health goals.       Melissa Eubanks, JAN-CNP

## 2024-06-25 ENCOUNTER — APPOINTMENT (OUTPATIENT)
Dept: PHARMACY | Facility: HOSPITAL | Age: 64
End: 2024-06-25
Payer: COMMERCIAL

## 2024-06-25 DIAGNOSIS — E11.9 TYPE 2 DIABETES MELLITUS WITHOUT COMPLICATION, WITHOUT LONG-TERM CURRENT USE OF INSULIN (MULTI): Primary | ICD-10-CM

## 2024-06-25 DIAGNOSIS — R73.9 HYPERGLYCEMIA: ICD-10-CM

## 2024-06-25 NOTE — ASSESSMENT & PLAN NOTE
Kimberley Murillo has controlled type 2 diabetes mellitus complicated by steroid use and hypertension as evidenced by her most recent A1c of 6.9% on 05/20/24 which had decreased from 7.8% on 12/29/24. Her time in range with her CGM system has been 84% over the past 14 days which is above goal of >70%. She has continued to tolerate the reductions in insulin well and reports that her hypoglycemia episode was due to her being outside working and putting off eating. We will continue working on reducing her insulin doses while she decreases her prednisone dose.   CHANGE:  Humalog Kwikpen 100 unit/mL 8 units under the skin before dinner (reduced from 10 units)  Lantus Solostar 100 unit/mL 15 units under the skin once daily at bedtime (effective with reduced prednisone dose 07/01, patient will continue 20 units daily through the rest of this week)  CONTINUE:   Metformin  mg 1 tablet by mouth twice daily (may take a 3rd tablet after dinner if blood sugars are still significantly elevated)  Her prednisone dose will reduce down to 7.5 mg daily on 07/01/24.   She has continued to watch what she is eating and notes that her exercise has continued to increase.   Compliance at present is estimated to be excellent.   Medications are dosed appropriately per most recent renal and hepatic function.   We will follow-up in 2 weeks to see how she has tolerated the insulin and prednisone dose reductions and to discuss reducing down her insulin doses further as her prednisone dose is tapered off. She was encouraged to reach out with any questions and/or concerns prior to our next follow-up.

## 2024-07-01 ENCOUNTER — LAB (OUTPATIENT)
Dept: LAB | Facility: LAB | Age: 64
End: 2024-07-01
Payer: COMMERCIAL

## 2024-07-01 DIAGNOSIS — M31.6 GIANT CELL ARTERITIS (MULTI): ICD-10-CM

## 2024-07-01 PROCEDURE — 85652 RBC SED RATE AUTOMATED: CPT

## 2024-07-01 PROCEDURE — 85025 COMPLETE CBC W/AUTO DIFF WBC: CPT

## 2024-07-01 PROCEDURE — 80053 COMPREHEN METABOLIC PANEL: CPT

## 2024-07-01 PROCEDURE — 36415 COLL VENOUS BLD VENIPUNCTURE: CPT

## 2024-07-01 PROCEDURE — 86140 C-REACTIVE PROTEIN: CPT

## 2024-07-02 LAB
ALBUMIN SERPL BCP-MCNC: 4.8 G/DL (ref 3.4–5)
ALP SERPL-CCNC: 59 U/L (ref 33–136)
ALT SERPL W P-5'-P-CCNC: 25 U/L (ref 7–45)
ANION GAP SERPL CALC-SCNC: 15 MMOL/L (ref 10–20)
AST SERPL W P-5'-P-CCNC: 16 U/L (ref 9–39)
BASOPHILS # BLD AUTO: 0.07 X10*3/UL (ref 0–0.1)
BASOPHILS NFR BLD AUTO: 0.7 %
BILIRUB SERPL-MCNC: 0.6 MG/DL (ref 0–1.2)
BUN SERPL-MCNC: 16 MG/DL (ref 6–23)
CALCIUM SERPL-MCNC: 10.7 MG/DL (ref 8.6–10.6)
CHLORIDE SERPL-SCNC: 102 MMOL/L (ref 98–107)
CO2 SERPL-SCNC: 26 MMOL/L (ref 21–32)
CREAT SERPL-MCNC: 0.74 MG/DL (ref 0.5–1.05)
CRP SERPL-MCNC: 0.21 MG/DL
EGFRCR SERPLBLD CKD-EPI 2021: >90 ML/MIN/1.73M*2
EOSINOPHIL # BLD AUTO: 0.12 X10*3/UL (ref 0–0.7)
EOSINOPHIL NFR BLD AUTO: 1.2 %
ERYTHROCYTE [DISTWIDTH] IN BLOOD BY AUTOMATED COUNT: 12.2 % (ref 11.5–14.5)
ERYTHROCYTE [SEDIMENTATION RATE] IN BLOOD BY WESTERGREN METHOD: 3 MM/H (ref 0–30)
GLUCOSE SERPL-MCNC: 106 MG/DL (ref 74–99)
HCT VFR BLD AUTO: 41.6 % (ref 36–46)
HGB BLD-MCNC: 14.2 G/DL (ref 12–16)
IMM GRANULOCYTES # BLD AUTO: 0.05 X10*3/UL (ref 0–0.7)
IMM GRANULOCYTES NFR BLD AUTO: 0.5 % (ref 0–0.9)
LYMPHOCYTES # BLD AUTO: 3.19 X10*3/UL (ref 1.2–4.8)
LYMPHOCYTES NFR BLD AUTO: 31.1 %
MCH RBC QN AUTO: 31 PG (ref 26–34)
MCHC RBC AUTO-ENTMCNC: 34.1 G/DL (ref 32–36)
MCV RBC AUTO: 91 FL (ref 80–100)
MONOCYTES # BLD AUTO: 1.04 X10*3/UL (ref 0.1–1)
MONOCYTES NFR BLD AUTO: 10.1 %
NEUTROPHILS # BLD AUTO: 5.78 X10*3/UL (ref 1.2–7.7)
NEUTROPHILS NFR BLD AUTO: 56.4 %
NRBC BLD-RTO: 0 /100 WBCS (ref 0–0)
PLATELET # BLD AUTO: 321 X10*3/UL (ref 150–450)
POTASSIUM SERPL-SCNC: 3.8 MMOL/L (ref 3.5–5.3)
PROT SERPL-MCNC: 6.8 G/DL (ref 6.4–8.2)
RBC # BLD AUTO: 4.58 X10*6/UL (ref 4–5.2)
SODIUM SERPL-SCNC: 139 MMOL/L (ref 136–145)
WBC # BLD AUTO: 10.3 X10*3/UL (ref 4.4–11.3)

## 2024-07-03 ENCOUNTER — APPOINTMENT (OUTPATIENT)
Dept: RHEUMATOLOGY | Facility: CLINIC | Age: 64
End: 2024-07-03
Payer: COMMERCIAL

## 2024-07-03 VITALS
BODY MASS INDEX: 32.72 KG/M2 | HEIGHT: 65 IN | SYSTOLIC BLOOD PRESSURE: 180 MMHG | WEIGHT: 196.4 LBS | OXYGEN SATURATION: 97 % | HEART RATE: 73 BPM | DIASTOLIC BLOOD PRESSURE: 90 MMHG | TEMPERATURE: 97.4 F

## 2024-07-03 DIAGNOSIS — R73.9 HYPERGLYCEMIA: ICD-10-CM

## 2024-07-03 DIAGNOSIS — E11.9 TYPE 2 DIABETES MELLITUS WITHOUT COMPLICATION, WITHOUT LONG-TERM CURRENT USE OF INSULIN (MULTI): ICD-10-CM

## 2024-07-03 DIAGNOSIS — M31.6 GIANT CELL ARTERITIS (MULTI): ICD-10-CM

## 2024-07-03 DIAGNOSIS — Z79.899 HIGH RISK MEDICATION USE: Primary | ICD-10-CM

## 2024-07-03 PROBLEM — G54.0 THORACIC OUTLET SYNDROME: Status: RESOLVED | Noted: 2024-01-25 | Resolved: 2024-07-03

## 2024-07-03 PROCEDURE — 3008F BODY MASS INDEX DOCD: CPT | Performed by: INTERNAL MEDICINE

## 2024-07-03 PROCEDURE — 3078F DIAST BP <80 MM HG: CPT | Performed by: INTERNAL MEDICINE

## 2024-07-03 PROCEDURE — 3050F LDL-C >= 130 MG/DL: CPT | Performed by: INTERNAL MEDICINE

## 2024-07-03 PROCEDURE — 3074F SYST BP LT 130 MM HG: CPT | Performed by: INTERNAL MEDICINE

## 2024-07-03 PROCEDURE — 99214 OFFICE O/P EST MOD 30 MIN: CPT | Performed by: INTERNAL MEDICINE

## 2024-07-03 PROCEDURE — 1036F TOBACCO NON-USER: CPT | Performed by: INTERNAL MEDICINE

## 2024-07-03 RX ORDER — LABETALOL 100 MG/1
100 TABLET, FILM COATED ORAL 2 TIMES DAILY
COMMUNITY
Start: 2024-07-01 | End: 2025-07-01

## 2024-07-03 RX ORDER — LISINOPRIL 2.5 MG/1
10 TABLET ORAL 2 TIMES DAILY
COMMUNITY
Start: 2024-05-01 | End: 2024-07-03 | Stop reason: ALTCHOICE

## 2024-07-03 RX ORDER — BLOOD-GLUCOSE SENSOR
EACH MISCELLANEOUS
Qty: 2 EACH | Refills: 3 | Status: SHIPPED | OUTPATIENT
Start: 2024-07-03

## 2024-07-03 RX ORDER — INSULIN LISPRO 100 [IU]/ML
INJECTION, SOLUTION INTRAVENOUS; SUBCUTANEOUS
Qty: 15 ML | Refills: 1 | Status: SHIPPED | OUTPATIENT
Start: 2024-07-03

## 2024-07-03 RX ORDER — PREDNISONE 5 MG/1
7.5 TABLET ORAL DAILY
Qty: 75 TABLET | Refills: 3 | Status: SHIPPED | OUTPATIENT
Start: 2024-07-03 | End: 2024-12-30

## 2024-07-03 RX ORDER — BIOTIN 5 MG
1 CAPSULE ORAL EVERY OTHER DAY
COMMUNITY

## 2024-07-03 RX ORDER — INSULIN GLARGINE 100 [IU]/ML
15 INJECTION, SOLUTION SUBCUTANEOUS NIGHTLY
Qty: 15 ML | Refills: 2 | Status: SHIPPED | OUTPATIENT
Start: 2024-07-03

## 2024-07-03 NOTE — PROGRESS NOTES
Subjective   Patient ID: Kimberley Murillo is a 63 y.o. female who presents for follow up regarding GCA and large vessel vasculitis.    HPI 63-year-old female here for follow-up regarding GCA and large vessel vasculitis.  Her  is also present.     The patient developed more joint pain following surgery for left hip labral tear 9/22..  She complained of some pain in her shoulders, neck, also fingers and toes..  Her shoulders, neck and hips hurt first thing in the morning.  She was stiff for 30 to 45 minutes in the morning.     The patient also complains of significant fatigue which started summer 2023.  She denies unintentional weight loss, fevers or night sweats.     In December 2023,  she started having difficulty with her right hand going numb and her fingers turning white when she is doing activities with her hands overhead, such as washing her hair.  She also notes that her arms got fatigued very quickly in this position, and she developed pain in her right proximal arm and right forearm.  She was having difficulty since December 2023 doing activities such as carrying a pot of spaghetti, as she notes that her right arm started hurting in her arms fatigue easily.  Her  thinks that she has lost muscle mass in her proximal arms.  She notes that her fingers cramp if she is trying to use her right hand.  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     The patient has a history of migraine headaches for several years.  She was to get a migraine every 2 weeks for several years.  Over the last several months, she gets 1-2 migraines per week.  She describes her migraines as starting in her neck, radiate over the entire head.  The headaches are associated with photophobia.  She gets nausea but no vomiting.  She uses Excedrin Migraine for the headaches.     Dr. Tesfaye saw the patient January 25, 2024 for evaluation of upper extremity arterial  insufficiency.  Physical exam noted by Dr. Tesfaye 1/25/24   included nonpalpable right brachial, right radial, and right ulnar pulses.  She was noted to have a weak left brachial and radial pulse.  She had palpable femoral and popliteal pulses that seemed normal.     CTA of chest 1/27/24  shows multifocal disease within the right subclavian and axillary artery with diffuse fusiform narrowing of the distal subclavian artery with proximal occlusion of the axillary artery.  The appearance of the multifocal narrowing and vessel thickening raises the question of a nonatherosclerotic vasculitis/arteritis.  She also has mild to moderate left subclavian and axillary artery disease with smooth fusiform narrowing of the distal subclavian and proximal axillary artery.    She had bilateral temporal artery biopsies done February 5, 2024 with concerns for giant cell arteritis.  The biopsies did not show evidence of vasculitis.    Prednisone 30 mg twice daily was started 2/02/24.  Unfortunately her blood sugar has been quite elevated.  She is known to be diabetic but was not previously on any medication.  Hemoglobin A1c was 7.8 December 2023-she was trying to manage her blood sugar with diet alone and had been doing better prior to starting prednisone.    Blood sugar started running as high as 500-600 after starting prednisone.  She did start metformin  mg with dinner 2/12/24.  She tried Amaryl but got severe dyspepsia from this.    She is now on metformin XR 1000 mg with breakfast and 500 mg with dinner, Lantus insulin 15 units at nighttime, Humalog KwikPen 8 units with dinner.  Patient also got a Dexcom device.  Our pharmacist has been assisting with glucose management.  Blood sugar is now in range 87% of time.  HbA1C May 20, 2024 is 6.9.    She started Actemra 162 mg subcutaneous weekly 4/24.  Prednisone was reduced to 7.5 mg daily 7/01/24.    She had 2 ER visits with complaints of headaches and elevated blood pressure.   The first ER visit was June 4, 2024.  Blood pressure in the ER was 229/85, which was taken with a thigh cuff and her leg.  They were unable to obtain upper extremity blood pressures.  She had a second ER visit June 5, 2024, again with headaches and elevated blood pressure.  She saw Dr. Carolyn Corbin July 1, 2024 and cardiology, who added labetalol 100 mg twice daily to her current antihypertensive (which is amlodipine 5 mg twice daily).  The patient is taking blood pressures at home and her leg as well.  Home blood pressures over the last 2 days have been 178/90 and 172/82.  The patient does not have a headache today.  Her headaches were described as starting in her neck and radiating over the top of her head (which sounds similar to what she described as prior migraines).    She also had an ER visit June 20, 2024 with right flank pain.  CT of abdomen pelvis showed a 4 mm kidney stone at the [facility].  The pain has now resolved.    She is still getting some arm claudication symptoms (especially in her right arm).  She continues to deny jaw claudication.  Recent headaches sound as though they were related to elevated blood pressure.  She feels as though her glasses are not working as well-last eye exam was January 2024 (I suspect this is due to fluctuating blood sugar).    She did start alendronate 70 mg orally weekly because of the osteopenia (for prevention of glucocorticoid induced osteoporosis).  Unfortunately she is getting epigastric and periumbilical abdominal pain after taking alendronate.  This has happened after the last 3 doses, so I do not think she will be able to tolerate this.    Labs 7/22: RF 37, ESR 21  Labs 11/22: JASSI negative, JASSI panel negative, ESR 13, CRP 1.7 (normal less than 1), CCP negative,   Labs 5/23: Hemoglobin A1c 6.6, cholesterol 211, HDL 56, , triglycerides 107, TSH 1.5, BMP normal except glucose 123  Labs 7/23: CBC normal, UA normal, 25-hydroxy vitamin D 22  Labs 12/23:  Hemoglobin A1c 7.8, CMP normal except glucose 134   Labs 1/31/24: ESR 39 (0-30), CRP 6.11 (less than 1), JASSI negative, rheumatoid factor 18 (0-15), CCP negative, IgG4 normal, ANCA negative, CBC with differential normal  Labs March 2024: ESR 23, CRP 0.94, CBC normal except white blood cell count 13.6, CMP normal except glucose 197, cholesterol 223, HDL 65, , triglycerides 147  Labs April 18, 2024: ESR 3, CRP less than 0.1 (less than 1)  Labs May 2024: ESR 1, CRP less than 0.1 (normal less than 1), cholesterol 256, HDL 88, , triglycerides 135, CBC normal, CMP normal X glucose 48  Labs July 2024: CMP normal except glucose 106 and calcium 10.7, CBC normal, ESR 3, CRP 0.21 (less than 1)     CXR 12/22: Heart is not enlarged, lungs are clear.  Multilevel degenerative changes noted of the thoracic spine.     Cardiac calcium score August 2020: 0    DEXA April 2024: T-score -2.2 left femoral neck, T-score -1.5 left total hip, T-score normal lumbar spine.  Estimated 10-year fracture risk by FRAX is 3.1% for hip fracture and 17% for major osteoporotic fracture.     Medical problem list:  Type 2 diabetes-currently managed with metformin 500 mg twice daily . Hemoglobin A1c was 7.8 December 2023.  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  Denies use of tobacco. Denies use of alcohol.  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     Family history:  Father- passed away from CAD, RA  Mother- diabetes, CAD  Paternal grandmother- RA    ROS:  General: Denies fevers or chills.  CV: Denies chest pain or palpitations.  Denies leg edema.  Lungs: Denies coughing or shortness of breath.  Skin: Denies rashes  "or nodules.  MS: Still gets some arm claudication, especially right arm.  She is no longer getting the color changes in her right hand that she was initially getting with holding hand overhead.    Objective   Visit Vitals  /90 (BP Location: Right leg, Patient Position: Lying, BP Cuff Size: Thigh)   Pulse 73   Temp 36.3 °C (97.4 °F)   Ht 1.651 m (5' 5\")   Wt 89.1 kg (196 lb 6.4 oz)   SpO2 97%   BMI 32.68 kg/m²   OB Status Postmenopausal   Smoking Status Never   BSA 2.02 m²    /71 left arm, digital cuff. BP could not be obtained in right arm.    Physical Exam  HEENT: PERRL, EOMI. S/p bilateral temporary biopsies.  Neck: Supple, no nodes.  CV: RRR, no MGR. no bruits heard over neck or upper chest.  Lungs: Clear, no rales or wheezes.  Abdomen: Soft, nontender. No hepatosplenomegaly.  Extremities:  No cyanosis, clubbing, or edema.  MS: No synovitis.  Skin: No rashes or nodules.  Pulses: Faint left radial pulse noted, could not palpate right radial pulse.     Hands are warm with good capillary refill.    Assessment/Plan       GCA-has large vessel involvement without cranial arteritis (had negative bilateral temporary biopsies).  Presented with complaints of joint aching involving shoulders, neck, and  hips (less so hands and feet) since 9/22. (Likely PMR)  Complained of significant fatigue with 30 to 45 minutes of morning stiffness since summer 2023.  Also had right arm claudication since 12/23.  Initial labs remarkable for ESR of 39 and CRP 6.11 (normal less than 1).    CT angio chest with and without contrast done January 26, 2024 shows multifocal narrowing and vessel thickening in the right subclavian artery, including a long segment circumferential narrowing measuring 1.5 cm (with 75% narrowing).  There is abrupt narrowing and occlusion of the proximal right axillary artery.  There is mild to moderate narrowing of the distal left subclavian artery and mild to moderate narrowing of the proximal axillary " artery on the left .  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     She started prednisone 30 mg twice daily February 2, 2024 with some improvement of her arm claudication.  Prednisone was tapered to 7.5 mg daily 7/01/24..  Actemra 162 mg subcutaneous weekly was started early April 2024.    Diabetes-currently under better control with metformin XR 1000 mg in morning and 500 mg in evening, Lantus 15 units at bedtime, Humalog KwikPen 8 units with dinner.  Hemoglobin A1c is 6.9 on May 20, 2024.  Glucose in range 87% of time by Dexcom device.    Osteopenia- unfortunately she is getting-abdominal pain after taking the alendronate (has happened after 3 doses).  I advised her to stop this for now.    Hyperlipidemia-she has a history of statin intolerance.  She tried 2 different statins previously which caused myalgias.    Plan:  Presumptive prednisone taper will be as follows:  July 1st: 7.5 mg daily  July 15: 5 mg daily.  July 29: 2.5 mg daily.  August 12: stop prednisone.  Check labs in 1 month: CBC with diff, CMP, ESR, CRP, fasting lipid panel.  Consider referral back to vascular surgery after tapered off prednisone to see if right subclavian stent would be warranted.  Follow up in 4-6 weeks.

## 2024-07-03 NOTE — PATIENT INSTRUCTIONS
Presumptive prednisone taper will be as follows:  July 1st: 7.5 mg daily  July 15: 5 mg daily.  July 29: 2.5 mg daily.  August 12: stop prednisone.  Check labs in 1 month: CBC with diff, CMP, ESR, CRP, fasting lipid panel.  Consider referral back to vascular surgery after tapered off prednisone to see if right subclavian stent would be warranted.  Follow up in 4-6 weeks.

## 2024-07-09 ENCOUNTER — APPOINTMENT (OUTPATIENT)
Dept: PHARMACY | Facility: HOSPITAL | Age: 64
End: 2024-07-09
Payer: COMMERCIAL

## 2024-07-09 ENCOUNTER — TELEMEDICINE (OUTPATIENT)
Dept: PHARMACY | Facility: HOSPITAL | Age: 64
End: 2024-07-09
Payer: COMMERCIAL

## 2024-07-09 DIAGNOSIS — E11.9 TYPE 2 DIABETES MELLITUS WITHOUT COMPLICATION, WITHOUT LONG-TERM CURRENT USE OF INSULIN (MULTI): Primary | ICD-10-CM

## 2024-07-09 ASSESSMENT — ENCOUNTER SYMPTOMS
FATIGUE: 0
POLYDIPSIA: 0
BLURRED VISION: 0
VISUAL CHANGE: 0

## 2024-07-09 NOTE — ASSESSMENT & PLAN NOTE
Kimbreley Murillo has controlled type 2 diabetes mellitus complicated by steroid use and hypertension as evidenced by her most recent A1c of 6.9% on 05/20/24 which had decreased from 7.8% on 12/29/24. Her time in range with her CGM system has been 87% over the past 14 days which is above goal of >70%. She has continued to tolerate the reductions in insulin well and reports that her hypoglycemia episode was due to her being outside working and putting off eating. We will continue working on reducing her insulin doses while she decreases her prednisone dose.   Today no hypoglycemia is reported and her blood sugar readings have continued to improve.   CHANGE: Lantus Solostar 100 unit/mL 10 units under the skin once daily at bedtime (effective with prednisone dose 07/15, she will continue 15 units daily through the rest of this week)  CONTINUE:   Metformin  mg 1 tablet by mouth twice daily (may take a 3rd tablet after dinner if blood sugars are still significantly elevated)  Humalog Kwikpen 100 unit/mL 8 units under the skin before dinner   Her prednisone dose will reduce down to 5 mg daily on 07/15/24  Compliance at present is estimated to be excellent.   Medications are dosed appropriately per most recent renal and hepatic function   We will follow-up in 2-weeks to see how she has tolerated the dose reductions of both her Lantus and prednisone and to discuss reducing her insulin down further as her prednisone dose is tapered off. She was encouraged to reach out with any questions and/or concerns prior to our next follow-up.

## 2024-07-09 NOTE — PROGRESS NOTES
Patient is sent at the request of Kelsey Lombardi,* for my opinion regarding Type 2 diabetes.  My final recommendations will be communicated back to the requesting provider by way of shared medical record.    Subjective     Diabetes  She presents for her follow-up diabetic visit. She has type 2 diabetes mellitus. The initial diagnosis of diabetes was made 1 year ago. Her disease course has been improving. There are no hypoglycemic associated symptoms. Pertinent negatives for diabetes include no blurred vision, no fatigue, no polydipsia, no polyuria and no visual change. There are no hypoglycemic complications. Symptoms are stable. There are no diabetic complications. Risk factors for coronary artery disease include diabetes mellitus, dyslipidemia and hypertension (on steroid therapy). Current diabetic treatment includes oral agent (monotherapy) and insulin injections. She is compliant with treatment all of the time. She is currently taking insulin pre-dinner and at bedtime. Insulin injections are given by significant other. Rotation sites for injection include the abdominal wall and thighs. She is following a generally healthy diet. She rarely participates in exercise. Her overall blood glucose range is 130-140 mg/dl. An ACE inhibitor/angiotensin II receptor blocker is not being taken. She does not see a podiatrist.Eye exam is current.     At last pharmacy encounter patient was instructed to reduce her Lantus down to 15 units daily at bedtime with reduced prednisone dose on 07/01/24 and reduce her Humalog down to 8 units under the skin before dinner (decreased from 10 units). We are following-up today to see how her blood sugars have been with the reduced prednisone and insulin doses and determine a plan for her next reduced prednisone dose.     She reduced down to 7.5 mg of prednisone daily on 07/01/24 and will reduce down to 5 mg daily on 07/15/24.     She saw Dr. Elder last on 05/20/2024 and her  presumptive prednisone taper was planned:   July 15th: 5 mg daily  July 29th: 2.5 mg daily  August 12: stop prednisone     If <120 mg/dL at night she will hold 3rd tablet of Metformin to prevent hypoglycemia overnight.     Does note increased headache recently, we discussed that multiple things can play a role with this including medications but also may not be related to medications as well. She was encouraged to discuss with both primary care and Dr. Elder if they continue to worsen and do not improve.     Past Medical History:  She has a past medical history of Headache, unspecified, Personal history of diseases of the blood and blood-forming organs and certain disorders involving the immune mechanism, and Personal history of pneumonia (recurrent).    Past Surgical History:  She has a past surgical history that includes Other surgical history (11/03/2022); Other surgical history (11/03/2022); Other surgical history (11/03/2022); Other surgical history (11/03/2022); Other surgical history (11/03/2022); Other surgical history (11/03/2022); Other surgical history (11/03/2022); BI stereotactic guided breast right localization and biopsy (Right, 12/18/2023); and Breast biopsy (Right).    Social History:  She reports that she has never smoked. She has never used smokeless tobacco. She reports that she does not currently use alcohol. She reports that she does not use drugs.    Family History:  No family history on file.    Allergies:  Meloxicam, Salsalate, Glimepiride, Statins-hmg-coa reductase inhibitors, Adhesive, Adhesive tape-silicones, and Latex    Current diet:   Breakfast: 1 piece of multi grain whole wheat bread with organic peanut butter and a little cinnamon; 1/2 cup of coffee with cream (states it is plain but not flavored) or black tea with cinnamon   States she has to take bread with her prednisone to avoid stomach upset   Lunch: if at home she will have a protein with vegetable (hamburger with zucchini,  green beans, and spinach today), or a protein drink (if she's working) - 5 grams of carbs per protein drink   Dinner: tweaks what she makes the rest of her household, will use low-carb wraps with protein and vegetables (grilled chicken)   Snacks: nuts, beef sticks throughout the day   Fluids: water with lemon, occasionally with crystal light; does not use artificial sweeteners   States she avoids processed foods  Keeps protein shake on her if she is not eating a lot throughout the day     Current exercise:   No resistance or strength training  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 or will walk around the store   Has been trying to walk more after dinner  Today notes that her exercise has been continuing to increase     The patient does have a known family history of diabetes (father, mother)     Patient is using: glucometer and continuous glucose monitor  Full 14-day report attached to encounter today       Hypoglycemia frequency: 0%  Hypoglycemia awareness: Yes     Adverse Effects: none reported     Objective     Diabetes Pharmacotherapy:  Metformin  mg 1 tablet twice daily (will take a 3rd tablet if her blood sugar is still significantly elevated after dinner)  Insulin glargine (Lantus Solostar) 15 units daily at bedtime   Insulin lispro (Humalog Kwikpen) 8 units daily with dinner     Historical Diabetes Pharmacotherapy:   Glimepiride 1 mg (patient reported headache, stomach upset, and generally not feeling good while on it and wanted to stop it)    Primary/Secondary Prevention   Statin? No  ACE-I/ARB? No  Aspirin? No    Pertinent PMH Review:  PMH of Pancreatitis: No  PMH of Retinopathy: No  PMH of Urinary Tract Infections: No  PMH of MTC: No  PMH of MEN 2: No     Lab Review  Lab Results   Component Value Date    BILITOT 0.6 07/01/2024    CALCIUM 10.7 (H) 07/01/2024    CO2 26 07/01/2024     07/01/2024    CREATININE 0.74 07/01/2024    GLUCOSE 106 (H) 07/01/2024    ALKPHOS 59 07/01/2024    K 3.8  "07/01/2024    PROT 6.8 07/01/2024     07/01/2024    AST 16 07/01/2024    ALT 25 07/01/2024    BUN 16 07/01/2024    ANIONGAP 15 07/01/2024    MG 2.00 06/04/2024    ALBUMIN 4.8 07/01/2024    GFRF >90 05/22/2023     Lab Results   Component Value Date    TRIG 135 05/14/2024    CHOL 256 (H) 05/14/2024    LDLCALC 140 (H) 05/14/2024    HDL 88.9 05/14/2024     Lab Results   Component Value Date    HGBA1C 6.9 (A) 05/20/2024    HGBA1C 7.8 (H) 12/29/2023    HGBA1C 6.6 (A) 05/22/2023     No components found for: \"UACR\"  The 10-year ASCVD risk score (Nathan MIRAMONTES, et al., 2019) is: 15.8%    Values used to calculate the score:      Age: 63 years      Sex: Female      Is Non- : No      Diabetic: Yes      Tobacco smoker: No      Systolic Blood Pressure: 180 mmHg      Is BP treated: Yes      HDL Cholesterol: 88.9 mg/dL      Total Cholesterol: 256 mg/dL    Health Maintenance:   Foot Exam: None  Eye Exam: 02/20/24, will go for an updated exam 07/03  Lipid Panel:  mg/dL,  mg/dL 05/14/24  Urine Albumin: None   Influenza Immunization: does not get the flu shot   Pneumonia Immunization: none     Drug Interactions:  No significant drug-drug interactions exist requiring adjustment to medication therapy.     Assessment/Plan   Problem List Items Addressed This Visit       Type 2 diabetes mellitus without complication, without long-term current use of insulin (Multi) - Primary     Kimberley Murillo has controlled type 2 diabetes mellitus complicated by steroid use and hypertension as evidenced by her most recent A1c of 6.9% on 05/20/24 which had decreased from 7.8% on 12/29/24. Her time in range with her CGM system has been 87% over the past 14 days which is above goal of >70%. She has continued to tolerate the reductions in insulin well and reports that her hypoglycemia episode was due to her being outside working and putting off eating. We will continue working on reducing her insulin doses while she " decreases her prednisone dose.   Today no hypoglycemia is reported and her blood sugar readings have continued to improve.   CHANGE: Lantus Solostar 100 unit/mL 10 units under the skin once daily at bedtime (effective with prednisone dose 07/15, she will continue 15 units daily through the rest of this week)  CONTINUE:   Metformin  mg 1 tablet by mouth twice daily (may take a 3rd tablet after dinner if blood sugars are still significantly elevated)  Humalog Kwikpen 100 unit/mL 8 units under the skin before dinner   Her prednisone dose will reduce down to 5 mg daily on 07/15/24  Compliance at present is estimated to be excellent.   Medications are dosed appropriately per most recent renal and hepatic function   We will follow-up in 2-weeks to see how she has tolerated the dose reductions of both her Lantus and prednisone and to discuss reducing her insulin down further as her prednisone dose is tapered off. She was encouraged to reach out with any questions and/or concerns prior to our next follow-up.          Relevant Orders    Follow Up In [facility] - Pharmacy     Pharmacy Follow-Up: 07/23/2024  Rheumatology Follow-Up: 08/15/2024  PCP Follow-Up: 09/04/2024    Steven Gilbert PharmD     Continue all meds under the continuation of care with the referring provider and clinical pharmacy team.

## 2024-07-22 ASSESSMENT — ENCOUNTER SYMPTOMS
VISUAL CHANGE: 0
BLURRED VISION: 0
FATIGUE: 0
POLYDIPSIA: 0

## 2024-07-22 NOTE — PROGRESS NOTES
Patient is sent at the request of Kelsey Lombardi,* for my opinion regarding Type 2 diabetes.  My final recommendations will be communicated back to the requesting provider by way of shared medical record.    Subjective     Diabetes  She presents for her follow-up diabetic visit. She has type 2 diabetes mellitus. The initial diagnosis of diabetes was made 1 year ago. Her disease course has been improving. There are no hypoglycemic associated symptoms. Pertinent negatives for diabetes include no blurred vision, no fatigue, no polydipsia, no polyuria and no visual change. There are no hypoglycemic complications. Symptoms are stable. There are no diabetic complications. Risk factors for coronary artery disease include diabetes mellitus, dyslipidemia and hypertension (on steroid therapy). Current diabetic treatment includes oral agent (monotherapy) and insulin injections. She is compliant with treatment all of the time. She is currently taking insulin pre-dinner and at bedtime. Insulin injections are given by significant other. Rotation sites for injection include the abdominal wall and thighs. She is following a generally healthy diet. She participates in exercise intermittently. Her overall blood glucose range is 130-140 mg/dl. An ACE inhibitor/angiotensin II receptor blocker is not being taken. She does not see a podiatrist.Eye exam is current.     At last pharmacy encounter patient was instructed to reduce her Lantus down to 10 units daily at bedtime with reduced prednisone dose on 07/15/24. Today patient reports that she has still been giving Lantus 15 units daily. We are following-up today to see how her blood sugars have been with the reduced prednisone and insulin doses and determine a plan for her next reduced prednisone dose.     She reduced down to 5 mg of prednisone daily on 07/15/24 and will reduce down to 2.5 mg daily on 07/29/24.     She saw Dr. Elder last on 07/03/24 and her presumptive  prednisone taper was continued:   July 29th: 2.5 mg daily  August 12: stop prednisone     If <120 mg/dL at night she will hold 3rd tablet of Metformin to prevent hypoglycemia overnight.     Does note continued side effects from Actemra - brain fog, increased teeth sensitivity (has to use sensitive toothpaste)    Past Medical History:  She has a past medical history of Headache, unspecified, Personal history of diseases of the blood and blood-forming organs and certain disorders involving the immune mechanism, and Personal history of pneumonia (recurrent).    Past Surgical History:  She has a past surgical history that includes Other surgical history (11/03/2022); Other surgical history (11/03/2022); Other surgical history (11/03/2022); Other surgical history (11/03/2022); Other surgical history (11/03/2022); Other surgical history (11/03/2022); Other surgical history (11/03/2022); BI stereotactic guided breast right localization and biopsy (Right, 12/18/2023); and Breast biopsy (Right).    Social History:  She reports that she has never smoked. She has never used smokeless tobacco. She reports that she does not currently use alcohol. She reports that she does not use drugs.    Family History:  No family history on file.    Allergies:  Meloxicam, Salsalate, Glimepiride, Statins-hmg-coa reductase inhibitors, Adhesive, Adhesive tape-silicones, and Latex    Current diet:   Breakfast: 1 piece of multi grain whole wheat bread with organic peanut butter and a little cinnamon; 1/2 cup of coffee with cream (states it is plain but not flavored) or black tea with cinnamon   States she has to take bread with her prednisone to avoid stomach upset   Lunch: if at home she will have a protein with vegetable (hamburger with zucchini, green beans, and spinach today), or a protein drink (if she's working) - 5 grams of carbs per protein drink   Dinner: tweaks what she makes the rest of her household, will use low-carb wraps with  protein and vegetables (grilled chicken)   Snacks: nuts, beef sticks throughout the day   Fluids: water with lemon, occasionally with crystal light; does not use artificial sweeteners   States she avoids processed foods  Keeps protein shake on her if she is not eating a lot throughout the day     Current exercise:   No resistance or strength training  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 or will walk around the store   Has been trying to walk more after dinner  Will spend time working on the yard      The patient does have a known family history of diabetes (father, mother)     Patient is using: glucometer and continuous glucose monitor  Time in range has increased to 93%        Hypoglycemia frequency: 0%  Hypoglycemia awareness: Yes     Adverse Effects: none reported     Objective     Diabetes Pharmacotherapy:  Metformin  mg 1 tablet twice daily (will take a 3rd tablet if her blood sugar is still significantly elevated after dinner)  Insulin glargine (Lantus Solostar) 15 units daily at bedtime  Insulin lispro (Humalog Kwikpen) 8 units daily with dinner     Historical Diabetes Pharmacotherapy:   Glimepiride 1 mg (patient reported headache, stomach upset, and generally not feeling good while on it and wanted to stop it)    Primary/Secondary Prevention   Statin? No  ACE-I/ARB? No  Aspirin? No    Pertinent PMH Review:  PMH of Pancreatitis: No  PMH of Retinopathy: No  PMH of Urinary Tract Infections: No  PMH of MTC: No  PMH of MEN 2: No     Lab Review  Lab Results   Component Value Date    BILITOT 0.6 07/01/2024    CALCIUM 10.7 (H) 07/01/2024    CO2 26 07/01/2024     07/01/2024    CREATININE 0.74 07/01/2024    GLUCOSE 106 (H) 07/01/2024    ALKPHOS 59 07/01/2024    K 3.8 07/01/2024    PROT 6.8 07/01/2024     07/01/2024    AST 16 07/01/2024    ALT 25 07/01/2024    BUN 16 07/01/2024    ANIONGAP 15 07/01/2024    MG 2.00 06/04/2024    ALBUMIN 4.8 07/01/2024    GFRF >90 05/22/2023     Lab Results  "  Component Value Date    TRIG 135 05/14/2024    CHOL 256 (H) 05/14/2024    LDLCALC 140 (H) 05/14/2024    HDL 88.9 05/14/2024     Lab Results   Component Value Date    HGBA1C 6.9 (A) 05/20/2024    HGBA1C 7.8 (H) 12/29/2023    HGBA1C 6.6 (A) 05/22/2023     No components found for: \"UACR\"  The 10-year ASCVD risk score (Nathan MIRAMONTES, et al., 2019) is: 15.8%    Values used to calculate the score:      Age: 63 years      Sex: Female      Is Non- : No      Diabetic: Yes      Tobacco smoker: No      Systolic Blood Pressure: 180 mmHg      Is BP treated: Yes      HDL Cholesterol: 88.9 mg/dL      Total Cholesterol: 256 mg/dL    Health Maintenance:   Foot Exam: None  Eye Exam: 02/20/24, will go for an updated exam 07/03  Lipid Panel:  mg/dL,  mg/dL 05/14/24  Urine Albumin: None   Influenza Immunization: does not get the flu shot   Pneumonia Immunization: none     Drug Interactions:  No significant drug-drug interactions exist requiring adjustment to medication therapy.     Assessment/Plan   Problem List Items Addressed This Visit       Type 2 diabetes mellitus without complication, without long-term current use of insulin (Multi) - Primary     Kimberley Murillo has controlled type 2 diabetes mellitus complicated by steroid use and hypertension as evidenced by her most recent A1c of 6.9% on 05/20/24 which had decreased from 7.8% on 12/29/24. Her time in range with her CGM system has been 93% over the past 14 days which is above goal of >70%. She has continued to tolerate her insulin dose well and based off of time in range would likely tolerate a decrease down to 5 units daily with her reduced prednisone dose next Monday.   Today no hypoglycemia is reported and her blood sugar readings have continued to improve.   CHANGE:   Lantus Solostar 100 unit/mL 5 units under the skin once daily at bedtime (effective with prednisone dose 07/29, she will continue 15 units daily through the rest of this " week)  Humalog Kwikpen 100 unit/mL 5 units under the skin before dinner (reduced from 8 units effective today)  CONTINUE: Metformin  mg 1 tablet by mouth twice daily (may take a 3rd tablet after dinner if blood sugars are still significantly elevated)  Her prednisone dose will reduce down to 2.5 mg daily on 07/29/24  Compliance at present is estimated to be excellent.   Medications are dosed appropriately per most recent renal and hepatic function   We will follow-up in 2-weeks to see how she has tolerated the dose reductions of her Lantus, Humalog, and prednisone and to discuss reducing her insulin down further as her prednisone dose is tapered off with the goal to hopefully be completely off of insulin after her prednisone taper is completed. She was encouraged to reach out with any questions and/or concerns prior to our next follow-up.          Relevant Medications    insulin glargine (Lantus Solostar U-100 Insulin) 100 unit/mL (3 mL) pen    insulin lispro (HumaLOG KwikPen Insulin) 100 unit/mL injection    Other Relevant Orders    Follow Up In [facility] - Pharmacy     Other Visit Diagnoses       Hyperglycemia        Relevant Medications    insulin glargine (Lantus Solostar U-100 Insulin) 100 unit/mL (3 mL) pen    Other Relevant Orders    Follow Up In [facility] Pharmacy          Pharmacy Follow-Up: 08/06/2024  Rheumatology Follow-Up: 08/15/2024  PCP Follow-Up: 09/04/2024    Steven Gilbert PharmD     Continue all meds under the continuation of care with the referring provider and clinical pharmacy team.

## 2024-07-23 ENCOUNTER — APPOINTMENT (OUTPATIENT)
Dept: PHARMACY | Facility: HOSPITAL | Age: 64
End: 2024-07-23
Payer: COMMERCIAL

## 2024-07-23 DIAGNOSIS — E11.9 TYPE 2 DIABETES MELLITUS WITHOUT COMPLICATION, WITHOUT LONG-TERM CURRENT USE OF INSULIN (MULTI): Primary | ICD-10-CM

## 2024-07-23 DIAGNOSIS — R73.9 HYPERGLYCEMIA: ICD-10-CM

## 2024-07-23 RX ORDER — INSULIN GLARGINE 100 [IU]/ML
5 INJECTION, SOLUTION SUBCUTANEOUS NIGHTLY
Qty: 15 ML | Refills: 1 | Status: SHIPPED | OUTPATIENT
Start: 2024-07-23

## 2024-07-23 RX ORDER — INSULIN LISPRO 100 [IU]/ML
5 INJECTION, SOLUTION INTRAVENOUS; SUBCUTANEOUS DAILY
Qty: 15 ML | Refills: 1 | Status: SHIPPED | OUTPATIENT
Start: 2024-07-23

## 2024-07-23 NOTE — ASSESSMENT & PLAN NOTE
Kimberley Murillo has controlled type 2 diabetes mellitus complicated by steroid use and hypertension as evidenced by her most recent A1c of 6.9% on 05/20/24 which had decreased from 7.8% on 12/29/24. Her time in range with her CGM system has been 93% over the past 14 days which is above goal of >70%. She has continued to tolerate her insulin dose well and based off of time in range would likely tolerate a decrease down to 5 units daily with her reduced prednisone dose next Monday.   Today no hypoglycemia is reported and her blood sugar readings have continued to improve.   CHANGE:   Lantus Solostar 100 unit/mL 5 units under the skin once daily at bedtime (effective with prednisone dose 07/29, she will continue 15 units daily through the rest of this week)  Humalog Kwikpen 100 unit/mL 5 units under the skin before dinner (reduced from 8 units effective today)  CONTINUE: Metformin  mg 1 tablet by mouth twice daily (may take a 3rd tablet after dinner if blood sugars are still significantly elevated)  Her prednisone dose will reduce down to 2.5 mg daily on 07/29/24  Compliance at present is estimated to be excellent.   Medications are dosed appropriately per most recent renal and hepatic function   We will follow-up in 2-weeks to see how she has tolerated the dose reductions of her Lantus, Humalog, and prednisone and to discuss reducing her insulin down further as her prednisone dose is tapered off with the goal to hopefully be completely off of insulin after her prednisone taper is completed. She was encouraged to reach out with any questions and/or concerns prior to our next follow-up.

## 2024-07-29 ENCOUNTER — APPOINTMENT (OUTPATIENT)
Dept: PRIMARY CARE | Facility: CLINIC | Age: 64
End: 2024-07-29
Payer: COMMERCIAL

## 2024-07-29 VITALS
OXYGEN SATURATION: 99 % | TEMPERATURE: 97.2 F | BODY MASS INDEX: 33.3 KG/M2 | SYSTOLIC BLOOD PRESSURE: 188 MMHG | WEIGHT: 199.9 LBS | DIASTOLIC BLOOD PRESSURE: 92 MMHG | HEART RATE: 72 BPM | HEIGHT: 65 IN

## 2024-07-29 DIAGNOSIS — I10 HYPERTENSION, UNSPECIFIED TYPE: ICD-10-CM

## 2024-07-29 PROCEDURE — 99213 OFFICE O/P EST LOW 20 MIN: CPT | Performed by: NURSE PRACTITIONER

## 2024-07-29 PROCEDURE — 3080F DIAST BP >= 90 MM HG: CPT | Performed by: NURSE PRACTITIONER

## 2024-07-29 PROCEDURE — 3077F SYST BP >= 140 MM HG: CPT | Performed by: NURSE PRACTITIONER

## 2024-07-29 PROCEDURE — 3008F BODY MASS INDEX DOCD: CPT | Performed by: NURSE PRACTITIONER

## 2024-07-29 PROCEDURE — 3050F LDL-C >= 130 MG/DL: CPT | Performed by: NURSE PRACTITIONER

## 2024-07-29 RX ORDER — AMLODIPINE BESYLATE 10 MG/1
10 TABLET ORAL DAILY
Qty: 90 TABLET | Refills: 3 | Status: SHIPPED | OUTPATIENT
Start: 2024-07-29 | End: 2025-07-29

## 2024-07-29 RX ORDER — HYDRALAZINE HYDROCHLORIDE 25 MG/1
25 TABLET, FILM COATED ORAL 3 TIMES DAILY PRN
Qty: 90 TABLET | Refills: 0 | Status: SHIPPED | OUTPATIENT
Start: 2024-07-29 | End: 2024-10-27

## 2024-07-29 ASSESSMENT — ENCOUNTER SYMPTOMS
HEADACHES: 1
ACTIVITY CHANGE: 0
FEVER: 0
CHEST TIGHTNESS: 1
NUMBNESS: 1
SHORTNESS OF BREATH: 1
CHILLS: 0
DIAPHORESIS: 0

## 2024-07-29 NOTE — PROGRESS NOTES
Chief Complaint  Follow-up (HTN).    History Of Present Illness  Kimberley Murillo is a 63 y.o. female presents today for follow up of Follow-up (HTN).    HTN   Started on and uptitrated to dose of labetalol 200 mg p.o. twice daily, cont on amlodipine.    Had Ziopatch 7/1-7/15: 4 beat SVT@ 203 bpm, 12 beat SVT @ 164 bpm.<1% SVE, VE.    Just started the increased dose of labetalol 1 day ago. Too soon to make additional changes at this time.      Checks bPs at home 179/90 is the  lowest she monitored.     BP today still slightly elevated.     She reduced down to 5 mg of prednisone daily on 07/15/24 and will reduce down to 2.5 mg daily on 07/29/24 x 2 weeks then done.      Kimberley intermittently experiences SOB and chest tightness-- denies both currently.  Has not had stress test in past.   Review of Systems  Review of Systems   Constitutional:  Negative for activity change, chills, diaphoresis and fever.   Respiratory:  Positive for chest tightness (tightness and heavyness in chest with activity) and shortness of breath.    Cardiovascular:  Negative for chest pain.   Neurological:  Positive for numbness and headaches.       Past Medical History  She has a past medical history of Headache, unspecified, Personal history of diseases of the blood and blood-forming organs and certain disorders involving the immune mechanism, and Personal history of pneumonia (recurrent).    Surgical History  She has a past surgical history that includes Other surgical history (11/03/2022); Other surgical history (11/03/2022); Other surgical history (11/03/2022); Other surgical history (11/03/2022); Other surgical history (11/03/2022); Other surgical history (11/03/2022); Other surgical history (11/03/2022); BI stereotactic guided breast right localization and biopsy (Right, 12/18/2023); and Breast biopsy (Right).    Family History  No family history on file.     Social History  She reports that she has never smoked. She has never used smokeless  "tobacco. She reports that she does not currently use alcohol. She reports that she does not use drugs.    DEPRESSION SCREEN  Over the past 2 weeks, how often have you been bothered by any of the following problems?  Little interest or pleasure in doing things: Not at all  Feeling down, depressed, or hopeless: Not at all      Allergies  Meloxicam, Salsalate, Glimepiride, Statins-hmg-coa reductase inhibitors, Adhesive, Adhesive tape-silicones, and Latex    Medications  Current Outpatient Medications   Medication Instructions    Actemra ACTPen 162 mg, subcutaneous, Every 7 days    amLODIPine (NORVASC) 10 mg, oral, Daily    blood-glucose sensor (FreeStyle Chava 3 Sensor) device APPLY 1 SENSOR TO THE BACK OF THE ARM FOR CONTINUOUS BLOOD SUGAR MONITORING. REMOVE SENSOR AND REPLACE WITH NEW SENSOR EVERY 14 DAYS.    calcium carbonate-vitamin D3 600 mg-12.5 mcg (500 unit) capsule 1 capsule, oral, Every other day    hydrALAZINE (APRESOLINE) 25 mg, oral, 3 times daily PRN    insulin lispro (HUMALOG KWIKPEN INSULIN) 5 Units, subcutaneous, Daily, With dinner    labetalol (NORMODYNE) 100 mg, oral, 2 times daily    Lantus Solostar U-100 Insulin 5 Units, subcutaneous, Nightly    metFORMIN XR (Glucophage-XR) 500 mg 24 hr tablet Take 1 tablets (500 mg) by mouth in the morning and 2 tablet (1000 mg) by mouth in the afternoon. Do not crush, chew, or split.    pen needle, diabetic 32 gauge x 5/32\" needle Use 1 pen needle with insulin injections up to 5 times daily    predniSONE (DELTASONE) 7.5 mg, oral, Daily    Unithroid 125 mcg, oral, Daily        Objective   BP (!) 188/92 (BP Location: Left arm, Patient Position: Sitting, BP Cuff Size: Adult)   Pulse 72   Temp 36.2 °C (97.2 °F) (Temporal)   Ht 1.651 m (5' 5\")   Wt 90.7 kg (199 lb 14.4 oz)   SpO2 99%   BMI 33.27 kg/m²    BMI: Estimated body mass index is 33.27 kg/m² as calculated from the following:    Height as of this encounter: 1.651 m (5' 5\").    Weight as of this encounter: " 90.7 kg (199 lb 14.4 oz).    Physical Exam  Physical Exam  Vitals and nursing note reviewed.   Constitutional:       Appearance: Normal appearance.   HENT:      Head: Normocephalic and atraumatic.      Nose: Nose normal.      Mouth/Throat:      Mouth: Mucous membranes are moist.      Pharynx: Oropharynx is clear.   Eyes:      Extraocular Movements: Extraocular movements intact.      Conjunctiva/sclera: Conjunctivae normal.      Pupils: Pupils are equal, round, and reactive to light.      Comments: glasses   Cardiovascular:      Rate and Rhythm: Normal rate and regular rhythm.      Heart sounds: Murmur heard.      Comments: Hands warm, normal capillary refill  Pulmonary:      Effort: Pulmonary effort is normal.      Breath sounds: Normal breath sounds.   Abdominal:      General: Bowel sounds are normal.      Palpations: Abdomen is soft.      Tenderness: There is no abdominal tenderness.   Musculoskeletal:         General: Normal range of motion.      Cervical back: Neck supple.   Skin:     General: Skin is warm and dry.   Neurological:      General: No focal deficit present.      Mental Status: She is alert and oriented to person, place, and time. Mental status is at baseline.   Psychiatric:         Mood and Affect: Mood normal.         Behavior: Behavior normal.         Thought Content: Thought content normal.         Judgment: Judgment normal.         Relevant Results and Imaging    No images are attached to the encounter.        Assessment and Plan  Assessment/Plan   Problem List Items Addressed This Visit             ICD-10-CM    Hypertension I10     - in past Trialed on hydrochlorothiazide for HTN, unable to tolerate due to SE of leg cramps.   - was on lisinopril 2.5 mg daily (started 9/15/23), stopped due to SE fatigue  -cont on amlodipine 10 mg daily.    -started on Labetalol 200 mg BID by Cardiology 7/28/24  -add PRN hydralazine for now-- pt to monitor BP at home at take hydralazine 25 mg PO TID PRN for SBP  >160.  Follow up in 1 month for further adjustment          Relevant Medications    hydrALAZINE (Apresoline) 25 mg tablet    amLODIPine (Norvasc) 10 mg tablet

## 2024-07-30 ENCOUNTER — APPOINTMENT (OUTPATIENT)
Dept: PRIMARY CARE | Facility: CLINIC | Age: 64
End: 2024-07-30
Payer: COMMERCIAL

## 2024-08-01 NOTE — ASSESSMENT & PLAN NOTE
- in past Trialed on hydrochlorothiazide for HTN, unable to tolerate due to SE of leg cramps.   - was on lisinopril 2.5 mg daily (started 9/15/23), stopped due to SE fatigue  -cont on amlodipine 10 mg daily.    -started on Labetalol 200 mg BID by Cardiology 7/28/24  -add PRN hydralazine for now-- pt to monitor BP at home at take hydralazine 25 mg PO TID PRN for SBP >160.  Follow up in 1 month for further adjustment

## 2024-08-05 ASSESSMENT — ENCOUNTER SYMPTOMS
POLYDIPSIA: 0
FATIGUE: 0
BLURRED VISION: 0
VISUAL CHANGE: 0

## 2024-08-05 NOTE — PROGRESS NOTES
Patient is sent at the request of Kelsey Lombardi,* for my opinion regarding Type 2 diabetes.  My final recommendations will be communicated back to the requesting provider by way of shared medical record.    Subjective     Diabetes  She presents for her follow-up diabetic visit. She has type 2 diabetes mellitus. The initial diagnosis of diabetes was made 1 year ago. Her disease course has been improving. There are no hypoglycemic associated symptoms. There are no diabetic associated symptoms. Pertinent negatives for diabetes include no blurred vision, no fatigue, no polydipsia, no polyuria and no visual change. There are no hypoglycemic complications. Symptoms are stable. There are no diabetic complications. Risk factors for coronary artery disease include diabetes mellitus, dyslipidemia and hypertension (on steroid therapy). Current diabetic treatment includes oral agent (monotherapy) and insulin injections. She is compliant with treatment all of the time. She is currently taking insulin pre-dinner and at bedtime. Insulin injections are given by significant other. Rotation sites for injection include the abdominal wall and thighs. She is following a generally healthy diet. She participates in exercise intermittently. Her overall blood glucose range is 130-140 mg/dl. An ACE inhibitor/angiotensin II receptor blocker is not being taken. She does not see a podiatrist.Eye exam is current.     At last pharmacy encounter patient was instructed to reduce her Lantus down to 5 units daily at bedtime with reduced prednisone dose on 07/29/24 and to reduce her Humalog dose down to 5 units from 8 units with dinner. We are following-up today to see how she has tolerated the dose reduction of both her insulins and prednisone.     She reduced down to 2.5 mg of prednisone daily on 07/29/24 and will stop prednisone on 08/12/24.     She saw Dr. Elder last on 07/03/24 and her presumptive prednisone taper was continued:    August 12: stop prednisone     If <120 mg/dL at night she will hold 3rd tablet of Metformin to prevent hypoglycemia overnight.     Notes some visual changes that she has attributed to Actemra therapy. Has followed up with her eye doctor and will discuss with Dr. Elder at next office visit.     Past Medical History:  She has a past medical history of Headache, unspecified, Personal history of diseases of the blood and blood-forming organs and certain disorders involving the immune mechanism, and Personal history of pneumonia (recurrent).    Past Surgical History:  She has a past surgical history that includes Other surgical history (11/03/2022); Other surgical history (11/03/2022); Other surgical history (11/03/2022); Other surgical history (11/03/2022); Other surgical history (11/03/2022); Other surgical history (11/03/2022); Other surgical history (11/03/2022); BI stereotactic guided breast right localization and biopsy (Right, 12/18/2023); and Breast biopsy (Right).    Social History:  She reports that she has never smoked. She has never used smokeless tobacco. She reports that she does not currently use alcohol. She reports that she does not use drugs.    Family History:  No family history on file.    Allergies:  Meloxicam, Salsalate, Glimepiride, Statins-hmg-coa reductase inhibitors, Adhesive, Adhesive tape-silicones, and Latex    Current diet:   Breakfast: 1 piece of multi grain whole wheat bread with organic peanut butter and a little cinnamon; 1/2 cup of coffee with cream (states it is plain but not flavored) or black tea with cinnamon   States she has to take bread with her prednisone to avoid stomach upset   Lunch: if at home she will have a protein with vegetable (hamburger with zucchini, green beans, and spinach today), or a protein drink (if she's working) - 5 grams of carbs per protein drink   Dinner: tweaks what she makes the rest of her household, will use low-carb wraps with protein and vegetables  (grilled chicken)   Snacks: nuts, beef sticks throughout the day   Fluids: water with lemon, occasionally with crystal light; does not use artificial sweeteners   States she avoids processed foods  Keeps protein shake on her if she is not eating a lot throughout the day   Has been limiting portions and watching her diet     Current exercise:   No resistance or strength training  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 or will walk around the store   Has been trying to walk more after dinner  Will spend time working on the yard      The patient does have a known family history of diabetes (father, mother)     Patient is using: glucometer and continuous glucose monitor  Time in range has increased to 93%        Hypoglycemia frequency: 0%  Notes her   Hypoglycemia awareness: Yes     Adverse Effects: none reported     Objective     Diabetes Pharmacotherapy:  Metformin  mg 1 tablet twice daily (will take a 3rd tablet if her blood sugar is still significantly elevated after dinner)  Insulin glargine (Lantus Solostar) 5 units daily at bedtime  Insulin lispro (Humalog Kwikpen) 5 units daily with dinner     Historical Diabetes Pharmacotherapy:   Glimepiride 1 mg (patient reported headache, stomach upset, and generally not feeling good while on it and wanted to stop it)    Primary/Secondary Prevention   Statin? No  ACE-I/ARB? No  Aspirin? No    Pertinent PMH Review:  PMH of Pancreatitis: No  PMH of Retinopathy: No  PMH of Urinary Tract Infections: No  PMH of MTC: No  PMH of MEN 2: No     Lab Review  Lab Results   Component Value Date    BILITOT 0.6 07/01/2024    CALCIUM 10.7 (H) 07/01/2024    CO2 26 07/01/2024     07/01/2024    CREATININE 0.74 07/01/2024    GLUCOSE 106 (H) 07/01/2024    ALKPHOS 59 07/01/2024    K 3.8 07/01/2024    PROT 6.8 07/01/2024     07/01/2024    AST 16 07/01/2024    ALT 25 07/01/2024    BUN 16 07/01/2024    ANIONGAP 15 07/01/2024    MG 2.00 06/04/2024    ALBUMIN 4.8 07/01/2024    GFRF  ">90 05/22/2023     Lab Results   Component Value Date    TRIG 135 05/14/2024    CHOL 256 (H) 05/14/2024    LDLCALC 140 (H) 05/14/2024    HDL 88.9 05/14/2024     Lab Results   Component Value Date    HGBA1C 6.9 (A) 05/20/2024    HGBA1C 7.8 (H) 12/29/2023    HGBA1C 6.6 (A) 05/22/2023     No components found for: \"UACR\"  The 10-year ASCVD risk score (Nathan MIRAMONTES, et al., 2019) is: 17.1%    Values used to calculate the score:      Age: 63 years      Sex: Female      Is Non- : No      Diabetic: Yes      Tobacco smoker: No      Systolic Blood Pressure: 188 mmHg      Is BP treated: Yes      HDL Cholesterol: 88.9 mg/dL      Total Cholesterol: 256 mg/dL    Health Maintenance:   Foot Exam: None  Eye Exam: Up to date, has gone twice in the past year (unsure of specific date)  Lipid Panel:  mg/dL,  mg/dL 05/14/24  Urine Albumin: None   Influenza Immunization: does not get the flu shot   Pneumonia Immunization: none     Drug Interactions:  No significant drug-drug interactions exist requiring adjustment to medication therapy.     Assessment/Plan   Problem List Items Addressed This Visit       Type 2 diabetes mellitus without complication, without long-term current use of insulin (Multi)     Kimberley Murillo has controlled type 2 diabetes mellitus complicated by steroid use and hypertension as evidenced by her most recent A1c of 6.9% on 05/20/24 which had decreased from 7.8% on 12/29/24. Her time in range with her CGM system has been 94% over the past 14 days which is above goal of >70%. Her blood sugars have continued to remain controlled with her reduced prednisone and insulin doses.    STOP:   Lantus Solostar 100 unit/mL 5 units under the skin once daily at bedtime (effective 08/12 when she stops prednisone therapy, she will continue taking it through the rest of this week)  Humalog Kwikpen 100 unit/mL 5 units under the skin before dinner (effective 08/12 when she stops prednisone therapy, she " will continue taking it through the rest of this week)  CONTINUE: Metformin  mg 1 tablet by mouth twice daily (may take a 3rd tablet after dinner if blood sugars are still significantly elevated)  Lantus and Humalog will remain on her medication list until next follow-up, at that point if her blood sugars remain controlled the prescriptions will be discontinued.   She will stop prednisone on 08/12/24.   Compliance at present is estimated to be excellent.   Medications are dosed appropriately per most recent renal and hepatic function   We will follow-up in 2-weeks to see how she has tolerated the holding her insulin therapy starting when she has completed her prednisone taper and how her blood sugars have been. She was encouraged to reach out with any questions and/or concerns prior to our next follow-up.          Relevant Orders    Follow Up In [facility] - Pharmacy     Other Visit Diagnoses       Hyperglycemia        Relevant Orders    Follow Up In [facility]acy          Pharmacy Follow-Up: 08/20/2024   Rheumatology Follow-Up: 08/15/2024  PCP Follow-Up: 09/04/2024    Steven Gilbert PharmD     Continue all meds under the continuation of care with the referring provider and clinical pharmacy team.

## 2024-08-06 ENCOUNTER — APPOINTMENT (OUTPATIENT)
Dept: PHARMACY | Facility: HOSPITAL | Age: 64
End: 2024-08-06
Payer: COMMERCIAL

## 2024-08-06 DIAGNOSIS — E11.9 TYPE 2 DIABETES MELLITUS WITHOUT COMPLICATION, WITHOUT LONG-TERM CURRENT USE OF INSULIN (MULTI): ICD-10-CM

## 2024-08-06 DIAGNOSIS — R73.9 HYPERGLYCEMIA: ICD-10-CM

## 2024-08-06 RX ORDER — LABETALOL 200 MG/1
1 TABLET, FILM COATED ORAL
COMMUNITY
Start: 2024-07-26

## 2024-08-06 ASSESSMENT — ENCOUNTER SYMPTOMS: DIABETIC ASSOCIATED SYMPTOMS: 0

## 2024-08-06 NOTE — ASSESSMENT & PLAN NOTE
Kimberley Murillo has controlled type 2 diabetes mellitus complicated by steroid use and hypertension as evidenced by her most recent A1c of 6.9% on 05/20/24 which had decreased from 7.8% on 12/29/24. Her time in range with her CGM system has been 94% over the past 14 days which is above goal of >70%. Her blood sugars have continued to remain controlled with her reduced prednisone and insulin doses.    STOP:   Lantus Solostar 100 unit/mL 5 units under the skin once daily at bedtime (effective 08/12 when she stops prednisone therapy, she will continue taking it through the rest of this week)  Humalog Kwikpen 100 unit/mL 5 units under the skin before dinner (effective 08/12 when she stops prednisone therapy, she will continue taking it through the rest of this week)  CONTINUE: Metformin  mg 1 tablet by mouth twice daily (may take a 3rd tablet after dinner if blood sugars are still significantly elevated)  Lantus and Humalog will remain on her medication list until next follow-up, at that point if her blood sugars remain controlled the prescriptions will be discontinued.   She will stop prednisone on 08/12/24.   Compliance at present is estimated to be excellent.   Medications are dosed appropriately per most recent renal and hepatic function   We will follow-up in 2-weeks to see how she has tolerated the holding her insulin therapy starting when she has completed her prednisone taper and how her blood sugars have been. She was encouraged to reach out with any questions and/or concerns prior to our next follow-up.

## 2024-08-08 ENCOUNTER — TELEPHONE (OUTPATIENT)
Dept: PRIMARY CARE | Facility: CLINIC | Age: 64
End: 2024-08-08
Payer: COMMERCIAL

## 2024-08-08 NOTE — TELEPHONE ENCOUNTER
Pt called stating she is having a lot of swelling, feet and hands and now her face.   Didn't really notice the swelling until 08/03/24.   Blood pressure   07/29/24  190/10  before Hydralazine  07/30/24  took Hydralazine at 12:40  181/83 after med. Had  Headache and upset stomach after taking med.   Pt states that in the afternoon her blood pressure goes back up.       08/01/24  195/100.  Took bp pill,  1 hr later 160/82.  In the afternoon it goes back up.    Everyday the swelling has been getting worse each day.    Today face is more swollen.      She is also on a shot Actemera 162 mg.which causes her vision to be blurry.  Vision has gotten worse in the last week.        Pt:  [phone]

## 2024-08-14 ENCOUNTER — LAB (OUTPATIENT)
Dept: LAB | Facility: LAB | Age: 64
End: 2024-08-14
Payer: COMMERCIAL

## 2024-08-14 DIAGNOSIS — Z79.899 HIGH RISK MEDICATION USE: ICD-10-CM

## 2024-08-14 DIAGNOSIS — M31.6 GIANT CELL ARTERITIS (MULTI): ICD-10-CM

## 2024-08-14 LAB
ALBUMIN SERPL BCP-MCNC: 4.3 G/DL (ref 3.4–5)
ALP SERPL-CCNC: 73 U/L (ref 33–136)
ALT SERPL W P-5'-P-CCNC: 35 U/L (ref 7–45)
ANION GAP SERPL CALC-SCNC: 10 MMOL/L (ref 10–20)
AST SERPL W P-5'-P-CCNC: 25 U/L (ref 9–39)
BASOPHILS # BLD AUTO: 0.07 X10*3/UL (ref 0–0.1)
BASOPHILS NFR BLD AUTO: 1.3 %
BILIRUB SERPL-MCNC: 1 MG/DL (ref 0–1.2)
BUN SERPL-MCNC: 11 MG/DL (ref 6–23)
CALCIUM SERPL-MCNC: 10.6 MG/DL (ref 8.6–10.6)
CHLORIDE SERPL-SCNC: 104 MMOL/L (ref 98–107)
CHOLEST SERPL-MCNC: 259 MG/DL (ref 0–199)
CHOLESTEROL/HDL RATIO: 4.3
CO2 SERPL-SCNC: 32 MMOL/L (ref 21–32)
CREAT SERPL-MCNC: 0.82 MG/DL (ref 0.5–1.05)
CRP SERPL-MCNC: <0.1 MG/DL
EGFRCR SERPLBLD CKD-EPI 2021: 81 ML/MIN/1.73M*2
EOSINOPHIL # BLD AUTO: 0.22 X10*3/UL (ref 0–0.7)
EOSINOPHIL NFR BLD AUTO: 4.2 %
ERYTHROCYTE [DISTWIDTH] IN BLOOD BY AUTOMATED COUNT: 12.2 % (ref 11.5–14.5)
ERYTHROCYTE [SEDIMENTATION RATE] IN BLOOD BY WESTERGREN METHOD: <1 MM/H (ref 0–30)
GLUCOSE SERPL-MCNC: 132 MG/DL (ref 74–99)
HCT VFR BLD AUTO: 39 % (ref 36–46)
HDLC SERPL-MCNC: 60.7 MG/DL
HGB BLD-MCNC: 12.7 G/DL (ref 12–16)
IMM GRANULOCYTES # BLD AUTO: 0.01 X10*3/UL (ref 0–0.7)
IMM GRANULOCYTES NFR BLD AUTO: 0.2 % (ref 0–0.9)
LDLC SERPL CALC-MCNC: 159 MG/DL
LYMPHOCYTES # BLD AUTO: 2.07 X10*3/UL (ref 1.2–4.8)
LYMPHOCYTES NFR BLD AUTO: 39.4 %
MCH RBC QN AUTO: 30 PG (ref 26–34)
MCHC RBC AUTO-ENTMCNC: 32.6 G/DL (ref 32–36)
MCV RBC AUTO: 92 FL (ref 80–100)
MONOCYTES # BLD AUTO: 0.69 X10*3/UL (ref 0.1–1)
MONOCYTES NFR BLD AUTO: 13.1 %
NEUTROPHILS # BLD AUTO: 2.19 X10*3/UL (ref 1.2–7.7)
NEUTROPHILS NFR BLD AUTO: 41.8 %
NON HDL CHOLESTEROL: 198 MG/DL (ref 0–149)
NRBC BLD-RTO: 0 /100 WBCS (ref 0–0)
PLATELET # BLD AUTO: 217 X10*3/UL (ref 150–450)
POTASSIUM SERPL-SCNC: 3.8 MMOL/L (ref 3.5–5.3)
PROT SERPL-MCNC: 6.3 G/DL (ref 6.4–8.2)
RBC # BLD AUTO: 4.24 X10*6/UL (ref 4–5.2)
SODIUM SERPL-SCNC: 142 MMOL/L (ref 136–145)
TRIGL SERPL-MCNC: 196 MG/DL (ref 0–149)
VLDL: 39 MG/DL (ref 0–40)
WBC # BLD AUTO: 5.3 X10*3/UL (ref 4.4–11.3)

## 2024-08-14 PROCEDURE — 86140 C-REACTIVE PROTEIN: CPT

## 2024-08-14 PROCEDURE — 80053 COMPREHEN METABOLIC PANEL: CPT

## 2024-08-14 PROCEDURE — 80061 LIPID PANEL: CPT

## 2024-08-14 PROCEDURE — 85025 COMPLETE CBC W/AUTO DIFF WBC: CPT

## 2024-08-14 PROCEDURE — 85652 RBC SED RATE AUTOMATED: CPT

## 2024-08-14 PROCEDURE — 36415 COLL VENOUS BLD VENIPUNCTURE: CPT

## 2024-08-15 ENCOUNTER — APPOINTMENT (OUTPATIENT)
Dept: RHEUMATOLOGY | Facility: CLINIC | Age: 64
End: 2024-08-15
Payer: COMMERCIAL

## 2024-08-15 VITALS
BODY MASS INDEX: 33.36 KG/M2 | HEIGHT: 65 IN | HEART RATE: 82 BPM | OXYGEN SATURATION: 99 % | TEMPERATURE: 98.4 F | DIASTOLIC BLOOD PRESSURE: 104 MMHG | RESPIRATION RATE: 16 BRPM | WEIGHT: 200.2 LBS | SYSTOLIC BLOOD PRESSURE: 180 MMHG

## 2024-08-15 DIAGNOSIS — M31.6 GIANT CELL ARTERITIS (MULTI): Primary | ICD-10-CM

## 2024-08-15 DIAGNOSIS — I1A.0 RESISTANT HYPERTENSION: ICD-10-CM

## 2024-08-15 DIAGNOSIS — E11.9 TYPE 2 DIABETES MELLITUS WITHOUT COMPLICATION, WITHOUT LONG-TERM CURRENT USE OF INSULIN (MULTI): ICD-10-CM

## 2024-08-15 PROCEDURE — 3050F LDL-C >= 130 MG/DL: CPT | Performed by: INTERNAL MEDICINE

## 2024-08-15 PROCEDURE — 99214 OFFICE O/P EST MOD 30 MIN: CPT | Performed by: INTERNAL MEDICINE

## 2024-08-15 PROCEDURE — 4010F ACE/ARB THERAPY RXD/TAKEN: CPT | Performed by: INTERNAL MEDICINE

## 2024-08-15 PROCEDURE — 3008F BODY MASS INDEX DOCD: CPT | Performed by: INTERNAL MEDICINE

## 2024-08-15 PROCEDURE — 3080F DIAST BP >= 90 MM HG: CPT | Performed by: INTERNAL MEDICINE

## 2024-08-15 PROCEDURE — 3077F SYST BP >= 140 MM HG: CPT | Performed by: INTERNAL MEDICINE

## 2024-08-15 RX ORDER — LOSARTAN POTASSIUM 25 MG/1
100 TABLET ORAL DAILY
COMMUNITY

## 2024-08-15 RX ORDER — METFORMIN HYDROCHLORIDE 500 MG/1
500 TABLET, EXTENDED RELEASE ORAL
Qty: 90 TABLET | Refills: 1 | Status: SHIPPED | OUTPATIENT
Start: 2024-08-15 | End: 2024-08-20 | Stop reason: SDUPTHER

## 2024-08-15 ASSESSMENT — PATIENT HEALTH QUESTIONNAIRE - PHQ9
2. FEELING DOWN, DEPRESSED OR HOPELESS: NOT AT ALL
SUM OF ALL RESPONSES TO PHQ9 QUESTIONS 1 AND 2: 2
10. IF YOU CHECKED OFF ANY PROBLEMS, HOW DIFFICULT HAVE THESE PROBLEMS MADE IT FOR YOU TO DO YOUR WORK, TAKE CARE OF THINGS AT HOME, OR GET ALONG WITH OTHER PEOPLE: NOT DIFFICULT AT ALL
1. LITTLE INTEREST OR PLEASURE IN DOING THINGS: MORE THAN HALF THE DAYS

## 2024-08-15 NOTE — PATIENT INSTRUCTIONS
CTA of chest ordered.  You will follow up with Dr. Tesfaye after CTA of chest.  Renal artery duplex scan ordered (because of elevated BP in lower extremity).  Follow up in 4-6 weeks.

## 2024-08-15 NOTE — PROGRESS NOTES
Subjective   Patient ID: Kimberley Murillo is a 63 y.o. female who presents for follow up regarding GCA and large vessel vasculitis.    HPI 63-year-old female here for follow-up regarding GCA and large vessel vasculitis.     The patient developed more joint pain following surgery for left hip labral tear 9/22..  She complained of some pain in her shoulders, neck, also fingers and toes..  Her shoulders, neck and hips hurt first thing in the morning.  She was stiff for 30 to 45 minutes in the morning.     The patient also complains of significant fatigue which started summer 2023.  She denies unintentional weight loss, fevers or night sweats.     In December 2023,  she started having difficulty with her right hand going numb and her fingers turning white when she is doing activities with her hands overhead, such as washing her hair.  She also notes that her arms got fatigued very quickly in this position, and she developed pain in her right proximal arm and right forearm.  She was having difficulty since December 2023 doing activities such as carrying a pot of spaghetti, as she notes that her right arm started hurting in her arms fatigue easily.  Her  thinks that she has lost muscle mass in her proximal arms.  She notes that her fingers cramp if she is trying to use her right hand.  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     The patient has a history of migraine headaches for several years.  She was to get a migraine every 2 weeks for several years.  Over the last several months, she gets 1-2 migraines per week.  She describes her migraines as starting in her neck, radiate over the entire head.  The headaches are associated with photophobia.  She gets nausea but no vomiting.  She uses Excedrin Migraine for the headaches.     Dr. Tesfaye saw the patient January 25, 2024 for evaluation of upper extremity arterial insufficiency.  Physical exam noted by   Mera 1/25/24   included nonpalpable right brachial, right radial, and right ulnar pulses.  She was noted to have a weak left brachial and radial pulse.  She had palpable femoral and popliteal pulses that seemed normal.     CTA of chest 1/27/24  shows multifocal disease within the right subclavian and axillary artery with diffuse fusiform narrowing of the distal subclavian artery with proximal occlusion of the axillary artery.  The appearance of the multifocal narrowing and vessel thickening raises the question of a nonatherosclerotic vasculitis/arteritis.  She also has mild to moderate left subclavian and axillary artery disease with smooth fusiform narrowing of the distal subclavian and proximal axillary artery.    She had bilateral temporal artery biopsies done February 5, 2024 with concerns for giant cell arteritis.  The biopsies did not show evidence of vasculitis.    Prednisone 30 mg twice daily was started 2/02/24.  Unfortunately her blood sugar has been quite elevated.  She is known to be diabetic but was not previously on any medication.  Hemoglobin A1c was 7.8 December 2023-she was trying to manage her blood sugar with diet alone and had been doing better prior to starting prednisone.    Blood sugar started running as high as 500-600 after starting prednisone.    She is now on metformin  mg with dinner.  Patient also got a Dexcom device.  She was able to stop insulin.  Our pharmacist has been assisting with glucose management.  Blood sugar is now in range over 80% of time.  HbA1C May 20, 2024 is 6.9.  Glucoses have ranged from 129-136.    She started Actemra 162 mg subcutaneous weekly 4/24.  Prednisone weaned off prednisone 8/12/24.    She had 2 ER visits with complaints of headaches and elevated blood pressure.  The first ER visit was June 4, 2024.  Blood pressure in the ER was 229/85, which was taken with a thigh cuff on her leg.  They were unable to obtain upper extremity blood pressures.  She had  a second ER visit June 5, 2024, again with headaches and elevated blood pressure.  She saw Dr. Carolyn Corbin July 1, 2024 and cardiology, who added labetalol 200 mg twice daily to her current antihypertensive (which is amlodipine 5 mg twice daily). She is also on losartan 75 mg daily.    She also had an ER visit June 20, 2024 with right flank pain.  CT of abdomen pelvis showed a 4 mm kidney stone at the [facility].  The pain has now resolved.    She is still getting some arm claudication symptoms (especially in her right arm). Her hands still go numb when lifting her arms overhead, especially the right arm.  She gets a burning sensation in her right biceps muscle when she is using her arms, especially using her arms overhead.  Her arms also fatigues easily.  She notes that her symptoms recurred when prednisone was tapered below 5 mg daily.  ESR and CRP have remained normal.    She still gets episodic headaches, but is known to have migraines.  She had 1 headache this week.  She continues to deny jaw claudication.  She feels as though her glasses are not working as well-last eye exam was January 2024 (I suspect this is due to fluctuating blood sugar).    She tried alendronate for prevention of glucocorticoid induced osteoporosis, but needed to stop it due to dyspepsia.    Labs 7/22: RF 37, ESR 21  Labs 11/22: JASSI negative, JASSI panel negative, ESR 13, CRP 1.7 (normal less than 1), CCP negative,   Labs July 2024: CMP normal except glucose 106 and calcium 10.7, CBC normal, ESR 3, CRP 0.21 (less than 1)  Labs August 2024: ESR less than 1, CRP less than 0.1 (less than 1), CBC normal, CMP normal except glucose 132, cholesterol 259, HDL 60, , triglycerides 196     CXR 12/22: Heart is not enlarged, lungs are clear.  Multilevel degenerative changes noted of the thoracic spine.     Cardiac calcium score August 2020: 0    DEXA April 2024: T-score -2.2 left femoral neck, T-score -1.5 left total hip, T-score normal lumbar  "spine.  Estimated 10-year fracture risk by FRAX is 3.1% for hip fracture and 17% for major osteoporotic fracture.     Medical problem list:  Type 2 diabetes-currently managed with metformin 500 mg twice daily . Hemoglobin A1c was 7.8 December 2023.  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  Denies use of tobacco. Denies use of alcohol.  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     Family history:  Father- passed away from CAD, RA  Mother- diabetes, CAD  Paternal grandmother- RA    ROS:  General: Denies fevers or chills.  CV: Denies chest pain or palpitations.  Denies leg edema.  Lungs: Denies coughing or shortness of breath.  Skin: Denies rashes or nodules.  MS: Still gets some arm claudication, especially right arm.  She still gets color changes in right hand, especially with holding her arm overhead..  She notes easy arm fatigue when doing activities.    Objective   Visit Vitals  BP (!) 180/104 (BP Location: Left arm, Patient Position: Sitting, BP Cuff Size: Adult)   Pulse 82   Temp 36.9 °C (98.4 °F) (Skin)   Resp 16   Ht 1.651 m (5' 5\")   Wt 90.8 kg (200 lb 3.2 oz)   SpO2 99%   BMI 33.32 kg/m²   OB Status Postmenopausal   Smoking Status Never   BSA 2.04 m²    /71 left arm, digital cuff. BP could not be obtained in right arm.  Blood pressure above was taken in her leg with thigh cuff.    Physical Exam  HEENT: PERRL, EOMI. S/p bilateral temporary biopsies.  Neck: Supple, no nodes.  CV: RRR, no MGR. no bruits heard over neck or upper chest.  Lungs: Clear, no rales or wheezes.  Abdomen: Soft, nontender. No hepatosplenomegaly.  Extremities:  No cyanosis, clubbing, or edema.  MS: No " synovitis.  Skin: No rashes or nodules.  Pulses: Faint left radial pulse noted, could not palpate right radial pulse.     Hands are warm with good capillary refill.    Assessment/Plan   Problem List Items Addressed This Visit             ICD-10-CM    Type 2 diabetes mellitus without complication, without long-term current use of insulin (Multi) E11.9    Relevant Medications    metFORMIN XR (Glucophage-XR) 500 mg 24 hr tablet    Hypertension I10    Relevant Orders    Vascular US renal artery duplex complete    Giant cell arteritis (Multi) - Primary M31.6    Relevant Orders    CT angio chest w and wo IV contrast         GCA-has large vessel involvement without cranial arteritis (had negative bilateral temporary biopsies).  Presented with complaints of joint aching involving shoulders, neck, and  hips (less so hands and feet) since 9/22. (Likely PMR)  Complained of significant fatigue with 30 to 45 minutes of morning stiffness since summer 2023.  Also had right arm claudication since 12/23.  Initial labs remarkable for ESR of 39 and CRP 6.11 (normal less than 1).    CT angio chest with and without contrast done January 26, 2024 shows multifocal narrowing and vessel thickening in the right subclavian artery, including a long segment circumferential narrowing measuring 1.5 cm (with 75% narrowing).  There is abrupt narrowing and occlusion of the proximal right axillary artery.  There is mild to moderate narrowing of the distal left subclavian artery and mild to moderate narrowing of the proximal axillary artery on the left .  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   Bilateral temporal artery biopsies were negative.    She started prednisone 30 mg twice daily February 2, 2024 with some improvement of her arm claudication.  Prednisone was weaned off 8/12/24.  Actemra 162 mg subcutaneous weekly was started early April 2024.  Claudication symptoms worsened when prednisone was dropped below 5 mg  daily.    Recommend repeat CTA of chest.  Will refer back to Dr. Tesfaye to consider stent in right subclavian/ axillary artery, now that ESR and CRP are normal.    Diabetes-currently under better control with metformin 500 mg in evening, Hemoglobin A1c is 6.9 on May 20, 2024.  Glucose in range over 80% of time by Dexcom device.    Osteopenia- unfortunately she is getting-abdominal pain after taking the alendronate (has happened after 3 doses).  I advised her to stop this for now.    Hyperlipidemia-she has a history of statin intolerance.  She tried 2 different statins previously which caused myalgias.  May need to consider appointment with preventative cardiology to consider other treatment options.    Plan:  CTA of chest ordered.  You will follow up with Dr. Tesfaye after CTA of chest.  Renal artery duplex scan ordered (because of elevated BP in lower extremity).  Follow up in 4-6 weeks.

## 2024-08-19 ASSESSMENT — ENCOUNTER SYMPTOMS: DIABETIC ASSOCIATED SYMPTOMS: 0

## 2024-08-19 NOTE — PROGRESS NOTES
Patient is sent at the request of Kelsey Lombardi,* for my opinion regarding Type 2 diabetes.  My final recommendations will be communicated back to the requesting provider by way of shared medical record.    Subjective     Diabetes  She presents for her follow-up diabetic visit. She has type 2 diabetes mellitus. The initial diagnosis of diabetes was made 1 year ago. Her disease course has been improving. There are no hypoglycemic associated symptoms. There are no diabetic associated symptoms. There are no hypoglycemic complications. Symptoms are stable. There are no diabetic complications. Risk factors for coronary artery disease include diabetes mellitus, dyslipidemia and hypertension (on steroid therapy). Current diabetic treatment includes oral agent (monotherapy). She is compliant with treatment all of the time. She is following a generally healthy diet. She participates in exercise intermittently. Her overall blood glucose range is 130-140 mg/dl. An ACE inhibitor/angiotensin II receptor blocker is not being taken. She does not see a podiatrist.Eye exam is current.     At last pharmacy encounter patient's insulin was stopped when she stopped prednisone therapy and she was continued on just Metformin. We are following-up today to see how her blood sugars have been since stopping Prednisone and insulin.     She stopped prednisone as of 08/12/24.     Notes that she does feel sick after her Actemra shots and states her appetite has been lower. Also notes hot flashes and chills throughout the day and still notes brain fogginess.     Past Medical History:  She has a past medical history of Headache, unspecified, Personal history of diseases of the blood and blood-forming organs and certain disorders involving the immune mechanism, and Personal history of pneumonia (recurrent).    Past Surgical History:  She has a past surgical history that includes Other surgical history (11/03/2022); Other surgical history  (11/03/2022); Other surgical history (11/03/2022); Other surgical history (11/03/2022); Other surgical history (11/03/2022); Other surgical history (11/03/2022); Other surgical history (11/03/2022); BI stereotactic guided breast right localization and biopsy (Right, 12/18/2023); and Breast biopsy (Right).    Social History:  She reports that she has never smoked. She has never used smokeless tobacco. She reports that she does not currently use alcohol. She reports that she does not use drugs.    Family History:  No family history on file.    Allergies:  Meloxicam, Salsalate, Glimepiride, Hydralazine, Lisinopril, Statins-hmg-coa reductase inhibitors, Adhesive, Adhesive tape-silicones, and Latex    Current diet:   Breakfast: 1 piece of multi grain whole wheat bread with organic peanut butter and a little cinnamon; 1/2 cup of coffee with cream (states it is plain but not flavored) or black tea with cinnamon   States she has to take bread with her prednisone to avoid stomach upset   Lunch: if at home she will have a protein with vegetable (hamburger with zucchini, green beans, and spinach today), or a protein drink (if she's working) - 5 grams of carbs per protein drink   Dinner: tweaks what she makes the rest of her household, will use low-carb wraps with protein and vegetables (grilled chicken)   Snacks: nuts, beef sticks throughout the day   Fluids: water with lemon, occasionally with crystal light; does not use artificial sweeteners   States she avoids processed foods  Keeps protein shake on her if she is not eating a lot throughout the day   Has been limiting portions and watching her diet     Current exercise:   No resistance or strength training  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 or will walk around the store   Has been trying to walk more after dinner  Will spend time working on the yard      The patient does have a known family history of diabetes (father, mother)     Patient is using: glucometer and  "continuous glucose monitor  Time in range has decreased from 93% at last encounter to 88% over the past 14-days however this is still above goal of >70%        Hypoglycemia frequency: 2% - notes the sensor she had on on Monday 08/12 wasn't working, double checked with her glucometer and noted that her blood sugar was in normal range (time in range was likely higher than 88% since she was actually not hypoglycemic)  Hypoglycemia awareness: Yes     Adverse Effects: none reported     Objective     Diabetes Pharmacotherapy:  Metformin  mg 1 tablet twice daily     Historical Diabetes Pharmacotherapy:   Glimepiride 1 mg (patient reported headache, stomach upset, and generally not feeling good while on it and wanted to stop it)  Insulin glargine (Lantus Solostar) (discontinued when prednisone was stopped)  Insulin lispro (Humalog Kwikpen) (discontinued when prednisone was stopped)    Primary/Secondary Prevention   Statin? No  ACE-I/ARB? No  Aspirin? No    Pertinent PMH Review:  PMH of Pancreatitis: No  PMH of Retinopathy: No  PMH of Urinary Tract Infections: No  PMH of MTC: No  PMH of MEN 2: No     Lab Review  Lab Results   Component Value Date    BILITOT 1.0 08/14/2024    CALCIUM 10.6 08/14/2024    CO2 32 08/14/2024     08/14/2024    CREATININE 0.82 08/14/2024    GLUCOSE 132 (H) 08/14/2024    ALKPHOS 73 08/14/2024    K 3.8 08/14/2024    PROT 6.3 (L) 08/14/2024     08/14/2024    AST 25 08/14/2024    ALT 35 08/14/2024    BUN 11 08/14/2024    ANIONGAP 10 08/14/2024    MG 2.00 06/04/2024    ALBUMIN 4.3 08/14/2024    GFRF >90 05/22/2023     Lab Results   Component Value Date    TRIG 196 (H) 08/14/2024    CHOL 259 (H) 08/14/2024    LDLCALC 159 (H) 08/14/2024    HDL 60.7 08/14/2024     Lab Results   Component Value Date    HGBA1C 6.9 (A) 05/20/2024    HGBA1C 7.8 (H) 12/29/2023    HGBA1C 6.6 (A) 05/22/2023     No components found for: \"UACR\"  The 10-year ASCVD risk score (Nathan MIRAMONTES, et al., 2019) is: 19.8%    " Values used to calculate the score:      Age: 63 years      Sex: Female      Is Non- : No      Diabetic: Yes      Tobacco smoker: No      Systolic Blood Pressure: 180 mmHg      Is BP treated: Yes      HDL Cholesterol: 60.7 mg/dL      Total Cholesterol: 259 mg/dL    Health Maintenance:   Foot Exam: None  Eye Exam: Up to date, has gone twice in the past year (unsure of specific date)  Lipid Panel:  mg/dL,  mg/dL 05/14/24  Urine Albumin: None   Influenza Immunization: does not get the flu shot   Pneumonia Immunization: none     Drug Interactions:  No significant drug-drug interactions exist requiring adjustment to medication therapy.     Assessment/Plan   Problem List Items Addressed This Visit       Type 2 diabetes mellitus without complication, without long-term current use of insulin (Multi)     Kimberley Murillo has controlled type 2 diabetes mellitus complicated by steroid use and hypertension as evidenced by her most recent A1c of 6.9% on 05/20/24 which had decreased from 7.8% on 12/29/24. Her time in range with her CGM system has been 88% over the past 14 days which is above goal of >70%. Her blood sugars have continued to remain controlled since stopping both insulin and prednisone.   She has not been giving a 3rd tablet of Metformin recently. She has noted stomach upset and feeling sick which is likely due to therapy with Actemra, however, she is concerned that Metformin is playing a roll as well. She did hold her Metformin for a day and states that she still had stomach upset.   CONTINUE: Metformin  mg 1 tablet by mouth twice daily (updated prescription submitted for twice daily dosing)  She continues to report trouble with tolerating Actemra - abdominal pain, stomach upset, brain fogginess, and hot flashes and chills throughout the day and worse at night. She was encouraged to continue to discuss with Dr. Elder at future follow-ups.   She is currently paying out of  pocket for the Freestyle Chava 3 system. She was informed that since her blood sugars are in good range if she didn't want to pay for it anymore she can go back to using her glucometer once daily. Currently she wants to stick with the Chava 3 system while she is on Actemra.   She would like to eventually no longer be on Metformin as she is concerned about GI side effects. I explained to patient that it seems like most of her GI related issues are due to Actemra therapy as she held Metformin for a day and still had GI upset. We did discuss that there are other options available such as SGLT2 inhibitors (Jardiance and Farxiga) and DPP4 inhibitors (Januvia and Tradjenta) that, if affordable through her insurance, would likely not cause stomach upset. She was encouraged to discuss with Asha Travis, her PCP at follow-up.   Compliance at present is estimated to be excellent.   Medications are dosed appropriately per most recent renal and hepatic function   At this time, patient's blood sugars have come back down to normal range after being on basal/bolus insulin short term while on prednisone therapy. She will follow-up with her PCP regarding her diabetes medications, no further scheduled follow-up is needed with the pharmacy team at this time. Patient was encouraged to reach out with any questions and/or concerns if needed in the future.          Relevant Medications    metFORMIN XR (Glucophage-XR) 500 mg 24 hr tablet     Other Visit Diagnoses       Hyperglycemia              Pharmacy Follow-Up: As needed per patient or provider request   Rheumatology Follow-Up: 09/30/2024  PCP Follow-Up: 09/04/2024    Steven Gilbert PharmD     Continue all meds under the continuation of care with the referring provider and clinical pharmacy team.

## 2024-08-20 ENCOUNTER — APPOINTMENT (OUTPATIENT)
Dept: PHARMACY | Facility: HOSPITAL | Age: 64
End: 2024-08-20
Payer: COMMERCIAL

## 2024-08-20 DIAGNOSIS — R73.9 HYPERGLYCEMIA: ICD-10-CM

## 2024-08-20 DIAGNOSIS — E11.9 TYPE 2 DIABETES MELLITUS WITHOUT COMPLICATION, WITHOUT LONG-TERM CURRENT USE OF INSULIN (MULTI): ICD-10-CM

## 2024-08-20 RX ORDER — CHLORTHALIDONE 25 MG/1
25 TABLET ORAL
COMMUNITY
Start: 2024-08-19 | End: 2025-08-19

## 2024-08-20 RX ORDER — METFORMIN HYDROCHLORIDE 500 MG/1
500 TABLET, EXTENDED RELEASE ORAL
Qty: 180 TABLET | Refills: 1 | Status: SHIPPED | OUTPATIENT
Start: 2024-08-20

## 2024-08-20 NOTE — ASSESSMENT & PLAN NOTE
Kimberley Murillo has controlled type 2 diabetes mellitus complicated by steroid use and hypertension as evidenced by her most recent A1c of 6.9% on 05/20/24 which had decreased from 7.8% on 12/29/24. Her time in range with her CGM system has been 88% over the past 14 days which is above goal of >70%. Her blood sugars have continued to remain controlled since stopping both insulin and prednisone.   She has not been giving a 3rd tablet of Metformin recently. She has noted stomach upset and feeling sick which is likely due to therapy with Actemra, however, she is concerned that Metformin is playing a roll as well. She did hold her Metformin for a day and states that she still had stomach upset.   CONTINUE: Metformin  mg 1 tablet by mouth twice daily (updated prescription submitted for twice daily dosing)  She continues to report trouble with tolerating Actemra - abdominal pain, stomach upset, brain fogginess, and hot flashes and chills throughout the day and worse at night. She was encouraged to continue to discuss with Dr. Elder at future follow-ups.   She is currently paying out of pocket for the Freestyle Chava 3 system. She was informed that since her blood sugars are in good range if she didn't want to pay for it anymore she can go back to using her glucometer once daily. Currently she wants to stick with the Chava 3 system while she is on Actemra.   She would like to eventually no longer be on Metformin as she is concerned about GI side effects. I explained to patient that it seems like most of her GI related issues are due to Actemra therapy as she held Metformin for a day and still had GI upset. We did discuss that there are other options available such as SGLT2 inhibitors (Jardiance and Farxiga) and DPP4 inhibitors (Januvia and Tradjenta) that, if affordable through her insurance, would likely not cause stomach upset. She was encouraged to discuss with Asha Travis, her PCP at follow-up.   Compliance at  present is estimated to be excellent.   Medications are dosed appropriately per most recent renal and hepatic function   At this time, patient's blood sugars have come back down to normal range after being on basal/bolus insulin short term while on prednisone therapy. She will follow-up with her PCP regarding her diabetes medications, no further scheduled follow-up is needed with the pharmacy team at this time. Patient was encouraged to reach out with any questions and/or concerns if needed in the future.

## 2024-09-04 ENCOUNTER — APPOINTMENT (OUTPATIENT)
Dept: PRIMARY CARE | Facility: CLINIC | Age: 64
End: 2024-09-04
Payer: COMMERCIAL

## 2024-09-04 ENCOUNTER — OFFICE VISIT (OUTPATIENT)
Dept: PRIMARY CARE | Facility: CLINIC | Age: 64
End: 2024-09-04
Payer: COMMERCIAL

## 2024-09-04 VITALS
BODY MASS INDEX: 32.52 KG/M2 | DIASTOLIC BLOOD PRESSURE: 53 MMHG | WEIGHT: 195.2 LBS | TEMPERATURE: 97.3 F | SYSTOLIC BLOOD PRESSURE: 101 MMHG | HEIGHT: 65 IN | HEART RATE: 55 BPM | OXYGEN SATURATION: 96 %

## 2024-09-04 DIAGNOSIS — I10 HYPERTENSION, UNSPECIFIED TYPE: Primary | ICD-10-CM

## 2024-09-04 PROCEDURE — 4010F ACE/ARB THERAPY RXD/TAKEN: CPT | Performed by: NURSE PRACTITIONER

## 2024-09-04 PROCEDURE — 3074F SYST BP LT 130 MM HG: CPT | Performed by: NURSE PRACTITIONER

## 2024-09-04 PROCEDURE — 99213 OFFICE O/P EST LOW 20 MIN: CPT | Performed by: NURSE PRACTITIONER

## 2024-09-04 PROCEDURE — 1036F TOBACCO NON-USER: CPT | Performed by: NURSE PRACTITIONER

## 2024-09-04 PROCEDURE — 3078F DIAST BP <80 MM HG: CPT | Performed by: NURSE PRACTITIONER

## 2024-09-04 PROCEDURE — 3008F BODY MASS INDEX DOCD: CPT | Performed by: NURSE PRACTITIONER

## 2024-09-04 PROCEDURE — 3050F LDL-C >= 130 MG/DL: CPT | Performed by: NURSE PRACTITIONER

## 2024-09-04 ASSESSMENT — PATIENT HEALTH QUESTIONNAIRE - PHQ9
1. LITTLE INTEREST OR PLEASURE IN DOING THINGS: NOT AT ALL
1. LITTLE INTEREST OR PLEASURE IN DOING THINGS: NOT AT ALL
SUM OF ALL RESPONSES TO PHQ9 QUESTIONS 1 AND 2: 0
SUM OF ALL RESPONSES TO PHQ9 QUESTIONS 1 AND 2: 0
2. FEELING DOWN, DEPRESSED OR HOPELESS: NOT AT ALL
2. FEELING DOWN, DEPRESSED OR HOPELESS: NOT AT ALL

## 2024-09-04 NOTE — PROGRESS NOTES
Chief Complaint  Hypertension.    History Of Present Illness  Kimberley Murillo is a 63 y.o. female presents today for follow up of Hypertension.  Since last visit was evaluated by cardiologist .   Reports that her medications were adjusted but she was feeling dizzy and fatigued at home therefore she self adjusted medications.  States that she weaned herself off labetalol over 3 to 4 days as it was causing her stomach pain and nausea.  Currently she is taking   -  losartan 100 mg po daily.  - amlodipine 10 mg daily  - Chlorthalidone 25 mg po daily      BP today R UE:  error  (attempted x 2)    RLE: 183/101,    L forearm 96/65  , L LE: 167/96    - She states that she will have a renal artery duplex scan done at .       Review of Systems  Review of Systems   Constitutional:  Positive for fatigue. Negative for activity change, chills and fever.   Respiratory:  Negative for cough, choking, chest tightness and shortness of breath.    Cardiovascular:  Negative for chest pain and leg swelling.   Gastrointestinal:  Negative for abdominal pain.   Musculoskeletal:  Positive for arthralgias.   Skin:  Negative for rash.   Neurological:  Positive for weakness (Right upper and left upper extremity) and numbness (Tingling and intermittent numbness right forearm and hand). Negative for dizziness, tremors and speech difficulty.       Past Medical History  She has a past medical history of Headache, unspecified, Personal history of diseases of the blood and blood-forming organs and certain disorders involving the immune mechanism, and Personal history of pneumonia (recurrent).    Surgical History  She has a past surgical history that includes Other surgical history (11/03/2022); Other surgical history (11/03/2022); Other surgical history (11/03/2022); Other surgical history (11/03/2022); Other surgical history (11/03/2022); Other surgical history (11/03/2022); Other surgical history (11/03/2022); BI stereotactic guided  "breast right localization and biopsy (Right, 12/18/2023); and Breast biopsy (Right).    Family History  No family history on file.     Social History  She reports that she has never smoked. She has never used smokeless tobacco. She reports that she does not currently use alcohol. She reports that she does not use drugs.    DEPRESSION SCREEN  Over the past 2 weeks, how often have you been bothered by any of the following problems?  Little interest or pleasure in doing things: Not at all  Feeling down, depressed, or hopeless: Not at all      Allergies  Meloxicam, Salsalate, Glimepiride, Hydralazine, Lisinopril, Statins-hmg-coa reductase inhibitors, Adhesive, Adhesive tape-silicones, and Latex    Medications  Current Outpatient Medications   Medication Instructions    Actemra ACTPen 162 mg, subcutaneous, Every 7 days    amLODIPine (NORVASC) 10 mg, oral, Daily    blood-glucose sensor (FreeStyle Chava 3 Sensor) device APPLY 1 SENSOR TO THE BACK OF THE ARM FOR CONTINUOUS BLOOD SUGAR MONITORING. REMOVE SENSOR AND REPLACE WITH NEW SENSOR EVERY 14 DAYS.    calcium carbonate-vitamin D3 600 mg-12.5 mcg (500 unit) capsule 1 capsule, oral, Every other day    chlorthalidone (HYGROTON) 25 mg, oral, Daily RT    labetalol (Normodyne) 200 mg tablet 1 tablet, oral, Every 12 hours scheduled (0630,1830)    losartan (COZAAR) 100 mg, oral, Daily    metFORMIN XR (GLUCOPHAGE-XR) 500 mg, oral, 2 times daily (morning and late afternoon)    Unithroid 125 mcg, oral, Daily        Objective   /53 (BP Location: Left arm, Patient Position: Sitting, BP Cuff Size: Adult)   Pulse 55   Temp 36.3 °C (97.3 °F) (Temporal)   Ht 1.651 m (5' 5\")   Wt 88.5 kg (195 lb 3.2 oz)   SpO2 96%   BMI 32.48 kg/m²    BMI: Estimated body mass index is 32.48 kg/m² as calculated from the following:    Height as of this encounter: 1.651 m (5' 5\").    Weight as of this encounter: 88.5 kg (195 lb 3.2 oz).    Physical Exam  Physical Exam  Vitals and nursing note " reviewed.   Constitutional:       Appearance: Normal appearance.   HENT:      Head: Normocephalic and atraumatic.      Nose: Nose normal.      Mouth/Throat:      Mouth: Mucous membranes are moist.      Pharynx: Oropharynx is clear.   Eyes:      Extraocular Movements: Extraocular movements intact.      Conjunctiva/sclera: Conjunctivae normal.      Pupils: Pupils are equal, round, and reactive to light.      Comments: glasses   Cardiovascular:      Rate and Rhythm: Normal rate and regular rhythm.      Heart sounds: Murmur heard.      Comments: Hands warm, normal capillary refill  Faint left radial pulse, could not palpate right radial pulse.         Pulmonary:      Effort: Pulmonary effort is normal.      Breath sounds: Normal breath sounds.   Abdominal:      General: Bowel sounds are normal.      Palpations: Abdomen is soft.      Tenderness: There is no abdominal tenderness.   Musculoskeletal:         General: Normal range of motion.      Cervical back: Neck supple.   Skin:     General: Skin is warm and dry.   Neurological:      General: No focal deficit present.      Mental Status: She is alert and oriented to person, place, and time. Mental status is at baseline.   Psychiatric:         Mood and Affect: Mood normal.         Behavior: Behavior normal.         Thought Content: Thought content normal.         Judgment: Judgment normal.         Relevant Results and Imaging  No images are attached to the encounter.        Assessment and Plan  Assessment/Plan   Problem List Items Addressed This Visit             ICD-10-CM    Hypertension - Primary I10     - in past Trialed on hydrochlorothiazide for HTN, unable to tolerate due to SE of leg cramps.   - was on lisinopril 2.5 mg daily (started 9/15/23), stopped due to SE fatigue  -Self discontinued Labetalol due to side effect of abdominal pain  Now managed by cardiology .   -cont on amlodipine 10 mg daily, losartan 100 mg daily chlorthalidone 25 mg daily  -Advised  to follow-up with Dr. Corbin to inform her of labetalol discontinuation and further BP management   -pt to complete Renal artery duplex

## 2024-09-08 RX ORDER — LOSARTAN POTASSIUM 100 MG/1
1 TABLET ORAL
COMMUNITY
Start: 2024-08-19

## 2024-09-08 ASSESSMENT — ENCOUNTER SYMPTOMS
SPEECH DIFFICULTY: 0
ABDOMINAL PAIN: 0
SHORTNESS OF BREATH: 0
WEAKNESS: 1
CHILLS: 0
CHEST TIGHTNESS: 0
CHOKING: 0
ACTIVITY CHANGE: 0
ARTHRALGIAS: 1
FEVER: 0
COUGH: 0
FATIGUE: 1
NUMBNESS: 1
TREMORS: 0
DIZZINESS: 0

## 2024-09-08 NOTE — ASSESSMENT & PLAN NOTE
- in past Trialed on hydrochlorothiazide for HTN, unable to tolerate due to SE of leg cramps.   - was on lisinopril 2.5 mg daily (started 9/15/23), stopped due to SE fatigue  -Self discontinued Labetalol due to side effect of abdominal pain  Now managed by cardiology .   -cont on amlodipine 10 mg daily, losartan 100 mg daily chlorthalidone 25 mg daily  -Advised to follow-up with Dr. Corbin to inform her of labetalol discontinuation and further BP management   -pt to complete Renal artery duplex

## 2024-09-11 ENCOUNTER — APPOINTMENT (OUTPATIENT)
Dept: RADIOLOGY | Facility: CLINIC | Age: 64
End: 2024-09-11
Payer: COMMERCIAL

## 2024-09-13 ENCOUNTER — APPOINTMENT (OUTPATIENT)
Dept: RADIOLOGY | Facility: CLINIC | Age: 64
End: 2024-09-13
Payer: COMMERCIAL

## 2024-09-13 ENCOUNTER — APPOINTMENT (OUTPATIENT)
Dept: RHEUMATOLOGY | Facility: CLINIC | Age: 64
End: 2024-09-13
Payer: COMMERCIAL

## 2024-09-16 ENCOUNTER — APPOINTMENT (OUTPATIENT)
Dept: RADIOLOGY | Facility: CLINIC | Age: 64
End: 2024-09-16
Payer: COMMERCIAL

## 2024-09-16 ENCOUNTER — APPOINTMENT (OUTPATIENT)
Dept: VASCULAR MEDICINE | Facility: CLINIC | Age: 64
End: 2024-09-16
Payer: COMMERCIAL

## 2024-09-16 ENCOUNTER — HOSPITAL ENCOUNTER (OUTPATIENT)
Dept: VASCULAR MEDICINE | Facility: CLINIC | Age: 64
Discharge: HOME | End: 2024-09-16
Payer: COMMERCIAL

## 2024-09-16 DIAGNOSIS — I1A.0 RESISTANT HYPERTENSION: ICD-10-CM

## 2024-09-16 DIAGNOSIS — I10 ESSENTIAL (PRIMARY) HYPERTENSION: ICD-10-CM

## 2024-09-16 PROCEDURE — 93975 VASCULAR STUDY: CPT | Performed by: SURGERY

## 2024-09-16 PROCEDURE — 93975 VASCULAR STUDY: CPT

## 2024-09-23 DIAGNOSIS — M31.6 GIANT CELL ARTERITIS (MULTI): ICD-10-CM

## 2024-09-23 RX ORDER — TOCILIZUMAB 180 MG/ML
162 INJECTION, SOLUTION SUBCUTANEOUS
Qty: 12 EACH | Refills: 3 | Status: SHIPPED | OUTPATIENT
Start: 2024-09-23 | End: 2024-09-25

## 2024-09-25 DIAGNOSIS — M31.6 GIANT CELL ARTERITIS (MULTI): ICD-10-CM

## 2024-09-25 RX ORDER — TOCILIZUMAB 180 MG/ML
162 INJECTION, SOLUTION SUBCUTANEOUS
Qty: 12 EACH | Refills: 3 | Status: SHIPPED | OUTPATIENT
Start: 2024-09-25

## 2024-09-27 ENCOUNTER — HOSPITAL ENCOUNTER (OUTPATIENT)
Dept: RADIOLOGY | Facility: HOSPITAL | Age: 64
Discharge: HOME | End: 2024-09-27
Payer: COMMERCIAL

## 2024-09-27 DIAGNOSIS — I77.6 VASCULITIS (CMS-HCC): ICD-10-CM

## 2024-09-27 DIAGNOSIS — M31.6 GIANT CELL ARTERITIS (MULTI): ICD-10-CM

## 2024-09-27 DIAGNOSIS — M25.50 ARTHRALGIA, UNSPECIFIED JOINT: ICD-10-CM

## 2024-09-27 PROCEDURE — 71275 CT ANGIOGRAPHY CHEST: CPT

## 2024-09-27 PROCEDURE — 2550000001 HC RX 255 CONTRASTS: Performed by: INTERNAL MEDICINE

## 2024-09-30 ENCOUNTER — LAB (OUTPATIENT)
Dept: LAB | Facility: LAB | Age: 64
End: 2024-09-30
Payer: COMMERCIAL

## 2024-09-30 ENCOUNTER — APPOINTMENT (OUTPATIENT)
Dept: RHEUMATOLOGY | Facility: CLINIC | Age: 64
End: 2024-09-30
Payer: COMMERCIAL

## 2024-09-30 VITALS
HEART RATE: 84 BPM | TEMPERATURE: 98.2 F | DIASTOLIC BLOOD PRESSURE: 90 MMHG | SYSTOLIC BLOOD PRESSURE: 148 MMHG | RESPIRATION RATE: 16 BRPM | BODY MASS INDEX: 31.99 KG/M2 | OXYGEN SATURATION: 98 % | WEIGHT: 192 LBS | HEIGHT: 65 IN

## 2024-09-30 DIAGNOSIS — M31.6 GIANT CELL ARTERITIS (MULTI): Primary | ICD-10-CM

## 2024-09-30 DIAGNOSIS — M31.6 GIANT CELL ARTERITIS (MULTI): ICD-10-CM

## 2024-09-30 PROCEDURE — 86140 C-REACTIVE PROTEIN: CPT

## 2024-09-30 PROCEDURE — 3074F SYST BP LT 130 MM HG: CPT | Performed by: INTERNAL MEDICINE

## 2024-09-30 PROCEDURE — 36415 COLL VENOUS BLD VENIPUNCTURE: CPT

## 2024-09-30 PROCEDURE — 3079F DIAST BP 80-89 MM HG: CPT | Performed by: INTERNAL MEDICINE

## 2024-09-30 PROCEDURE — 85652 RBC SED RATE AUTOMATED: CPT

## 2024-09-30 PROCEDURE — 3050F LDL-C >= 130 MG/DL: CPT | Performed by: INTERNAL MEDICINE

## 2024-09-30 PROCEDURE — 4010F ACE/ARB THERAPY RXD/TAKEN: CPT | Performed by: INTERNAL MEDICINE

## 2024-09-30 PROCEDURE — 99214 OFFICE O/P EST MOD 30 MIN: CPT | Performed by: INTERNAL MEDICINE

## 2024-09-30 PROCEDURE — 3008F BODY MASS INDEX DOCD: CPT | Performed by: INTERNAL MEDICINE

## 2024-09-30 NOTE — PATIENT INSTRUCTIONS
Check ESR and CRP.  I will check if Dr. Tesfaye would consider doing stent of right subclavian or axillary artery.  Flu shot declined.  Follow-up in 3 months.

## 2024-09-30 NOTE — PROGRESS NOTES
Subjective   Patient ID: Kimberley Murillo is a 63 y.o. female who presents for follow up regarding GCA and large vessel vasculitis.    HPI 63-year-old female here for follow-up regarding GCA and large vessel vasculitis.     The patient developed more joint pain following surgery for left hip labral tear 9/22..  She complained of some pain in her shoulders, neck, also fingers and toes..  Her shoulders, neck and hips hurt first thing in the morning.  She was stiff for 30 to 45 minutes in the morning.     The patient also complains of significant fatigue which started summer 2023.  She denies unintentional weight loss, fevers or night sweats.     In December 2023,  she started having difficulty with her right hand going numb and her fingers turning white when she is doing activities with her hands overhead, such as washing her hair.  She also notes that her arms got fatigued very quickly in this position, and she developed pain in her right proximal arm and right forearm.  She was having difficulty since December 2023 doing activities such as carrying a pot of spaghetti, as she notes that her right arm started hurting in her arms fatigue easily.  Her  thinks that she has lost muscle mass in her proximal arms.  She notes that her fingers cramp if she is trying to use her right hand.  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     The patient has a history of migraine headaches for several years.  She was to get a migraine every 2 weeks for several years.  Over the last several months, she gets 1-2 migraines per week.  She describes her migraines as starting in her neck, radiate over the entire head.  The headaches are associated with photophobia.  She gets nausea but no vomiting.  She uses Excedrin Migraine for the headaches.     Dr. Tesfaye saw the patient January 25, 2024 for evaluation of upper extremity arterial insufficiency.  Physical exam noted by   Mera 1/25/24   included nonpalpable right brachial, right radial, and right ulnar pulses.  She was noted to have a weak left brachial and radial pulse.  She had palpable femoral and popliteal pulses that seemed normal.     CTA of chest 1/27/24  shows multifocal disease within the right subclavian and axillary artery with diffuse fusiform narrowing of the distal subclavian artery with proximal occlusion of the axillary artery.  The appearance of the multifocal narrowing and vessel thickening raises the question of a nonatherosclerotic vasculitis/arteritis.  She also has mild to moderate left subclavian and axillary artery disease with smooth fusiform narrowing of the distal subclavian and proximal axillary artery.    She had bilateral temporal artery biopsies done February 5, 2024 with concerns for giant cell arteritis.  The biopsies did not show evidence of vasculitis.    Prednisone 30 mg twice daily was started 2/02/24.  Unfortunately her blood sugar has been quite elevated.  She is known to be diabetic but was not previously on any medication.  Hemoglobin A1c was 7.8 December 2023-she was trying to manage her blood sugar with diet alone and had been doing better prior to starting prednisone.    Blood sugar started running as high as 500-600 after starting prednisone.    She is now on metformin  mg with dinner.  Patient also got a Dexcom device.  She was able to stop insulin.  Our pharmacist has been assisting with glucose management.  Blood sugar is now in range over 80% of time.  HbA1C May 20, 2024 is 6.9.  Glucoses have ranged from 129-136.    She started Actemra 162 mg subcutaneous weekly 4/24.  Prednisone weaned off prednisone 8/12/24.    She had 2 ER visits with complaints of headaches and elevated blood pressure.  The first ER visit was June 4, 2024.  Blood pressure in the ER was 229/85, which was taken with a thigh cuff on her leg.  They were unable to obtain upper extremity blood pressures.  She had  a second ER visit June 5, 2024, again with headaches and elevated blood pressure.  Current antihypertensives are amlodipine 10 mg daily, losartan 100 mg daily, and chlorthalidone 25 mg daily.    She also had an ER visit June 20, 2024 with right flank pain.  CT of abdomen pelvis showed a 4 mm kidney stone at the UVJ.  The pain has now resolved.    She is still getting some arm claudication symptoms (especially in her right arm). Her hands still go numb when lifting her arms overhead, especially the right arm.  She gets a burning sensation in her right biceps muscle when she is using her arms, especially using her arms overhead.  Her arms also fatigues easily.  She notes that her symptoms recurred when prednisone was tapered below 5 mg daily.  ESR and CRP have remained normal.    She still gets episodic headaches, but is known to have migraines.  She continues to deny jaw claudication.  She feels as though her glasses are not working as well-last eye exam was January 2024 (I suspect this is due to fluctuating blood sugar).    She tried alendronate for prevention of glucocorticoid induced osteoporosis, but needed to stop it due to dyspepsia.    Labs 7/22: RF 37, ESR 21  Labs 11/22: JASSI negative, JASSI panel negative, ESR 13, CRP 1.7 (normal less than 1), CCP negative,   Labs July 2024: CMP normal except glucose 106 and calcium 10.7, CBC normal, ESR 3, CRP 0.21 (less than 1)  Labs August 2024: ESR less than 1, CRP less than 0.1 (less than 1), CBC normal, CMP normal except glucose 132, cholesterol 259, HDL 60, , triglycerides 196  Labs 9/24: BMP normal except glucose 139     CXR 12/22: Heart is not enlarged, lungs are clear.  Multilevel degenerative changes noted of the thoracic spine.     Cardiac calcium score August 2020: 0    DEXA April 2024: T-score -2.2 left femoral neck, T-score -1.5 left total hip, T-score normal lumbar spine.  Estimated 10-year fracture risk by FRAX is 3.1% for hip fracture and 17% for major  "osteoporotic fracture.     Medical problem list:  Type 2 diabetes-currently managed with metformin 500 mg twice daily . Hemoglobin A1c was 7.8 December 2023.  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  Denies use of tobacco. Denies use of alcohol.  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     Family history:  Father- passed away from CAD, RA  Mother- diabetes, CAD  Paternal grandmother- RA    ROS:  General: Denies fevers or chills.  CV: Denies chest pain or palpitations.  Denies leg edema.  Lungs: Denies coughing or shortness of breath.  Skin: Denies rashes or nodules.  MS: Still gets some arm claudication, especially right arm.  She still gets color changes in right hand, especially with holding her arm overhead..  She notes easy arm fatigue when doing activities.    Objective   Visit Vitals  /82 (BP Location: Left arm, Patient Position: Sitting, BP Cuff Size: Adult)   Pulse 84   Temp 36.8 °C (98.2 °F) (Skin)   Resp 16   Ht 1.651 m (5' 5\")   Wt 87.1 kg (192 lb)   SpO2 98%   BMI 31.95 kg/m²   OB Status Postmenopausal   Smoking Status Never   BSA 2 m²     I could not manually hear blood pressure in either arm.  Thigh blood pressure is 148/90.    Physical Exam  HEENT: PERRL, EOMI. S/p bilateral temporary biopsies.  Neck: Supple, no nodes.  CV: RRR, no MGR. no bruits heard over neck or upper chest.  Lungs: Clear, no rales or wheezes.  Abdomen: Soft, nontender. No hepatosplenomegaly.  Extremities:  No cyanosis, clubbing, or edema.  MS: No synovitis.  Skin: No rashes or nodules.  Pulses: Faint left radial pulse noted, could not palpate right radial pulse.     Hands are warm with good capillary " refill.    Assessment/Plan   Problem List Items Addressed This Visit             ICD-10-CM    Giant cell arteritis (Multi) - Primary M31.6    Relevant Orders    C-Reactive Protein (Completed)    Sedimentation Rate (Completed)    BMI 31.0-31.9,adult Z68.31     GCA-has large vessel involvement without cranial arteritis (had negative bilateral temporary biopsies).  Presented with complaints of joint aching involving shoulders, neck, and  hips (less so hands and feet) since 9/22. (Likely PMR)  Complained of significant fatigue with 30 to 45 minutes of morning stiffness since summer 2023.  Also had right arm claudication since 12/23.  Initial labs remarkable for ESR of 39 and CRP 6.11 (normal less than 1).  Still has right arm claudication- ESR and CRP are still normal.    CT angio chest with and without contrast done January 26, 2024 shows multifocal narrowing and vessel thickening in the right subclavian artery, including a long segment circumferential narrowing measuring 1.5 cm (with 75% narrowing).  There is abrupt narrowing and occlusion of the proximal right axillary artery.  There is mild to moderate narrowing of the distal left subclavian artery and mild to moderate narrowing of the proximal axillary artery on the left .  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   Bilateral temporal artery biopsies were negative.    She started prednisone 30 mg twice daily February 2, 2024 with some improvement of her arm claudication.  Prednisone was weaned off 8/12/24.  Actemra 162 mg subcutaneous weekly was started early April 2024.  Claudication symptoms worsened when prednisone was dropped below 5 mg daily.    Recommend repeat CTA of chest.  Will refer back to Dr. Tesfaye to consider stent in right subclavian/ axillary artery, now that ESR and CRP are normal.    Diabetes-currently under better control with metformin 500 mg in evening, Hemoglobin A1c is 6.9 on May 20, 2024.  Glucose in range over 80% of time by  Dexcom device.    Osteopenia- unfortunately she is getting-abdominal pain after taking the alendronate (has happened after 3 doses).  I advised her to stop this for now.    Hyperlipidemia-she has a history of statin intolerance.  She tried 2 different statins previously which caused myalgias.  May need to consider appointment with preventative cardiology to consider other treatment options.    Plan:  Check ESR and CRP.  I will check if Dr. Tesfaye would consider doing stent of right subclavian or axillary artery.  Flu shot declined.  Follow-up in 3 months.

## 2024-10-01 LAB
CRP SERPL-MCNC: 0.18 MG/DL
ERYTHROCYTE [SEDIMENTATION RATE] IN BLOOD BY WESTERGREN METHOD: <1 MM/H (ref 0–30)

## 2024-11-11 ENCOUNTER — TELEPHONE (OUTPATIENT)
Dept: RHEUMATOLOGY | Facility: CLINIC | Age: 64
End: 2024-11-11
Payer: COMMERCIAL

## 2024-11-11 DIAGNOSIS — E03.9 HYPOTHYROIDISM, UNSPECIFIED TYPE: ICD-10-CM

## 2024-11-11 RX ORDER — LEVOTHYROXINE SODIUM 125 UG/1
125 TABLET ORAL DAILY
Qty: 90 TABLET | Refills: 0 | Status: SHIPPED | OUTPATIENT
Start: 2024-11-11 | End: 2025-11-11

## 2024-11-11 NOTE — TELEPHONE ENCOUNTER
Patient of Asha's- she has an appointment with you in Dec.     Patient left a message requesting a refill on Unithroid 125 mcg tablet sent to her verified pharmacy, [facility] in [place].    She would like a 90 day supply

## 2024-11-14 ENCOUNTER — LAB (OUTPATIENT)
Dept: LAB | Facility: LAB | Age: 64
End: 2024-11-14
Payer: COMMERCIAL

## 2024-11-14 ENCOUNTER — APPOINTMENT (OUTPATIENT)
Dept: CARDIOLOGY | Facility: CLINIC | Age: 64
End: 2024-11-14
Payer: COMMERCIAL

## 2024-11-14 VITALS
OXYGEN SATURATION: 98 % | HEART RATE: 73 BPM | SYSTOLIC BLOOD PRESSURE: 180 MMHG | HEIGHT: 65 IN | BODY MASS INDEX: 31.42 KG/M2 | DIASTOLIC BLOOD PRESSURE: 90 MMHG | WEIGHT: 188.6 LBS

## 2024-11-14 DIAGNOSIS — I77.1 SUBCLAVIAN ARTERIAL STENOSIS: Primary | ICD-10-CM

## 2024-11-14 DIAGNOSIS — I73.9 PERIPHERAL VASCULAR DISEASE, UNSPECIFIED (CMS-HCC): ICD-10-CM

## 2024-11-14 DIAGNOSIS — I77.1 SUBCLAVIAN ARTERIAL STENOSIS: ICD-10-CM

## 2024-11-14 DIAGNOSIS — G43.809 OTHER MIGRAINE WITHOUT STATUS MIGRAINOSUS, NOT INTRACTABLE: ICD-10-CM

## 2024-11-14 DIAGNOSIS — E78.00 HYPERCHOLESTEROLEMIA: ICD-10-CM

## 2024-11-14 DIAGNOSIS — M31.6 GIANT CELL ARTERITIS (MULTI): ICD-10-CM

## 2024-11-14 LAB
ANION GAP SERPL CALC-SCNC: 13 MMOL/L (ref 10–20)
BUN SERPL-MCNC: 15 MG/DL (ref 6–23)
CALCIUM SERPL-MCNC: 11 MG/DL (ref 8.6–10.6)
CHLORIDE SERPL-SCNC: 101 MMOL/L (ref 98–107)
CO2 SERPL-SCNC: 30 MMOL/L (ref 21–32)
CREAT SERPL-MCNC: 0.75 MG/DL (ref 0.5–1.05)
EGFRCR SERPLBLD CKD-EPI 2021: >90 ML/MIN/1.73M*2
ERYTHROCYTE [DISTWIDTH] IN BLOOD BY AUTOMATED COUNT: 12.3 % (ref 11.5–14.5)
GLUCOSE SERPL-MCNC: 97 MG/DL (ref 74–99)
HCT VFR BLD AUTO: 41 % (ref 36–46)
HGB BLD-MCNC: 13.6 G/DL (ref 12–16)
MCH RBC QN AUTO: 29.1 PG (ref 26–34)
MCHC RBC AUTO-ENTMCNC: 33.2 G/DL (ref 32–36)
MCV RBC AUTO: 88 FL (ref 80–100)
NRBC BLD-RTO: 0 /100 WBCS (ref 0–0)
PLATELET # BLD AUTO: 278 X10*3/UL (ref 150–450)
POTASSIUM SERPL-SCNC: 3.8 MMOL/L (ref 3.5–5.3)
RBC # BLD AUTO: 4.67 X10*6/UL (ref 4–5.2)
SODIUM SERPL-SCNC: 140 MMOL/L (ref 136–145)
WBC # BLD AUTO: 8.4 X10*3/UL (ref 4.4–11.3)

## 2024-11-14 PROCEDURE — 85027 COMPLETE CBC AUTOMATED: CPT

## 2024-11-14 PROCEDURE — 80048 BASIC METABOLIC PNL TOTAL CA: CPT

## 2024-11-14 PROCEDURE — 85652 RBC SED RATE AUTOMATED: CPT

## 2024-11-14 PROCEDURE — 36415 COLL VENOUS BLD VENIPUNCTURE: CPT

## 2024-11-14 PROCEDURE — 3080F DIAST BP >= 90 MM HG: CPT | Performed by: INTERNAL MEDICINE

## 2024-11-14 PROCEDURE — 86141 C-REACTIVE PROTEIN HS: CPT

## 2024-11-14 PROCEDURE — 3077F SYST BP >= 140 MM HG: CPT | Performed by: INTERNAL MEDICINE

## 2024-11-14 PROCEDURE — 99205 OFFICE O/P NEW HI 60 MIN: CPT | Performed by: INTERNAL MEDICINE

## 2024-11-14 PROCEDURE — 4010F ACE/ARB THERAPY RXD/TAKEN: CPT | Performed by: INTERNAL MEDICINE

## 2024-11-14 PROCEDURE — 3008F BODY MASS INDEX DOCD: CPT | Performed by: INTERNAL MEDICINE

## 2024-11-14 PROCEDURE — 3050F LDL-C >= 130 MG/DL: CPT | Performed by: INTERNAL MEDICINE

## 2024-11-14 RX ORDER — LISINOPRIL 2.5 MG/1
2.5 TABLET ORAL
COMMUNITY
Start: 2023-09-15 | End: 2024-11-14 | Stop reason: WASHOUT

## 2024-11-14 RX ORDER — CLOPIDOGREL BISULFATE 75 MG/1
300 TABLET ORAL ONCE
OUTPATIENT
Start: 2024-11-14 | End: 2024-11-14

## 2024-11-14 RX ORDER — ASPIRIN 325 MG
325 TABLET ORAL ONCE
OUTPATIENT
Start: 2024-11-14 | End: 2024-11-14

## 2024-11-14 RX ORDER — CLOPIDOGREL BISULFATE 75 MG/1
75 TABLET ORAL DAILY
Qty: 30 TABLET | Refills: 1 | Status: SHIPPED | OUTPATIENT
Start: 2024-11-14 | End: 2025-01-13

## 2024-11-14 RX ORDER — EZETIMIBE 10 MG/1
10 TABLET ORAL DAILY
Qty: 30 TABLET | Refills: 1 | Status: SHIPPED | OUTPATIENT
Start: 2024-11-14 | End: 2025-01-13

## 2024-11-14 NOTE — PROGRESS NOTES
PCP: Jaci Hernandez MD  VS: Mera  Rheum: Jael-Jesus Bernstein   Kimberley Murillo is a 63 y.o. female who complains of BUE claudication R>L .  Patient underwent temporal artery biopsy without evidence of giant cell arteritis.  She underwent steroid treatment with improvement in CRP and ESR (9/30/24 labs 0.18 and <1, respectively).  However, patient states she did not have any improvement in upper extremity symptoms despite steroid course.  She states both hands are turning ashen white intermittently, and +claudication preventing her from performing certain chores at home (baking cookies), with R>LUE sxs.  She states that the LUE is starting to become worse.    Patient has been undergoing therapy with Dr. Elder in rheumatology.  Patient is referred for stent placement for ongoing symptoms of the R>L upper extremity.    Notably, patient has some difficulty with certain medications, including statins.  She lives in the [facility].    Review of Systems:  Otherwise, limited cardiovascular review of systems is negative.    Past Medical History:  She has a past medical history of Headache, unspecified, Personal history of diseases of the blood and blood-forming organs and certain disorders involving the immune mechanism, and Personal history of pneumonia (recurrent).    Surgical History:   She has a past surgical history that includes Other surgical history (11/03/2022); Other surgical history (11/03/2022); Other surgical history (11/03/2022); Other surgical history (11/03/2022); Other surgical history (11/03/2022); Other surgical history (11/03/2022); Other surgical history (11/03/2022); BI stereotactic guided breast right localization and biopsy (Right, 12/18/2023); and Breast biopsy (Right).    Family History:   No family history on file.    Social History:   Tobacco Use: Low Risk  (11/14/2024)    Patient History     Smoking Tobacco Use: Never     Smokeless Tobacco Use: Never     Passive Exposure: Not on  "file       Outpatient Medications:    Current Outpatient Medications:     amLODIPine (Norvasc) 10 mg tablet, Take 1 tablet (10 mg) by mouth once daily., Disp: 90 tablet, Rfl: 3    blood-glucose sensor (FreeStyle Chava 3 Sensor) device, APPLY 1 SENSOR TO THE BACK OF THE ARM FOR CONTINUOUS BLOOD SUGAR MONITORING. REMOVE SENSOR AND REPLACE WITH NEW SENSOR EVERY 14 DAYS., Disp: 2 each, Rfl: 3    chlorthalidone (Hygroton) 25 mg tablet, Take 1 tablet (25 mg) by mouth once daily., Disp: , Rfl:     losartan (Cozaar) 100 mg tablet, Take 1 tablet (100 mg) by mouth early in the morning.., Disp: , Rfl:     metFORMIN XR (Glucophage-XR) 500 mg 24 hr tablet, Take 1 tablet (500 mg) by mouth 2 times daily (morning and late afternoon)., Disp: 180 tablet, Rfl: 1    tocilizumab (Actemra ACTPen) 162 mg/0.9 mL, Inject 0.9 mL (162 mg) under the skin every 7 days., Disp: 12 each, Rfl: 3    Unithroid 125 mcg tablet, Take 1 tablet (125 mcg) by mouth once daily., Disp: 90 tablet, Rfl: 0     Allergies:  Meloxicam, Salsalate, Glimepiride, Hydralazine, Labetalol, Lisinopril, Statins-hmg-coa reductase inhibitors, Adhesive, Adhesive tape-silicones, and Latex       Objective   Vital Signs:  /90 (BP Location: Right leg, Patient Position: Sitting, BP Cuff Size: Adult)   Pulse 73   Ht 1.651 m (5' 5\")   Wt 85.5 kg (188 lb 9.6 oz)   SpO2 98%   BMI 31.38 kg/m²     Physical Exam:  General: no acute distress  HEENT: EOMI, no scleral icterus.  Lungs: Clear to auscultation bilaterally without wheezing, rales, or rhonchi.  Cardiovascular: Regular rate and rhythm, soft ejection murmur.  There are bilateral subclavian bruits  Abdomen: Soft, nontender, nondistended. Bowel sounds present.  Extremities: The upper extremities are warm, decreased radial pulses bilaterally.  There is 2+ PT bilaterally.  Neurologic: Alert and oriented x3.    Pertinent Recent Cardiovascular Studies (personally reviewed):  Vascular studies:  ARIAN/TBI UE 1/24/24:   Dampened " waveforms BUE with diminished brachial pressures      Renal duplex 9/16/24: normal velocities and size of kidneys bilaterally    Cardiac studies:  Echocardiogram 12/7/23:  Left and right functions are preserved.  Asc Ao 3.3 cm. No PFO eval.    Laboratory values:  CMP:  Recent Labs     08/14/24  0852 07/01/24  1308 06/04/24  1257 05/14/24  0744 03/16/24  0833 01/31/24  1613 12/29/23  1128 05/22/23  0951 08/18/22  1159 07/25/22  0940    139 142 143 138 140 139 139   < > 141   K 3.8 3.8 3.3* 3.8 4.5 3.9 4.2 4.1   < > 3.8    102 106 104 99 103 102 105   < > 102   CO2 32 26 29 30 29 27 31 28   < > 29   ANIONGAP 10 15 10 13 15 14 10 10   < > 14   BUN 11 16 13 15 16 12 9 9   < > 9   CREATININE 0.82 0.74 0.65 0.65 0.63 0.73 0.61 0.75   < > 0.74   EGFR 81 >90 >90 >90 >90 >90 >90  --   --   --    MG  --   --  2.00  --   --   --   --   --   --  2.09    < > = values in this interval not displayed.     Recent Labs     08/14/24  0852 07/01/24  1308 06/04/24  1257 05/14/24  0744 03/16/24  0833   ALBUMIN 4.3 4.8 4.6 4.0 3.7   ALKPHOS 73 59 47 45 78   ALT 35 25 32 35 28   AST 25 16 19 17 12   BILITOT 1.0 0.6 0.7 0.6 0.6     CBC:  Recent Labs     08/14/24  0852 07/01/24  1308 06/04/24  1257 05/14/24  0744 03/16/24  0833 01/31/24  1613 07/19/23  0853   WBC 5.3 10.3 7.3 7.9 13.6* 9.1 7.9   HGB 12.7 14.2 13.9 12.6 13.0 12.3 12.9   HCT 39.0 41.6 41.0 39.7 40.2 39.3 40.7    321 223 209 266 379 356   MCV 92 91 95 95 85 86 89     HEME/ENDO:  Recent Labs     05/20/24  1542 12/29/23  1128 05/22/23  0951 07/25/22  0940 11/09/21  0903 08/05/20  0916   TSH  --   --  1.50 9.39* 1.78 1.03   HGBA1C 6.9* 7.8* 6.6* 7.0*  --   --       CARDIAC:   Recent Labs     06/04/24  1440 06/04/24  1257   TROPHS 8 7     Recent Labs     08/14/24  0852 05/14/24  0744 03/16/24  0833 05/22/23  0951 07/25/22  0940 11/09/21  0903   CHOL 259* 256* 223* 211* 217* 249*   LDLF 159* 140* 128* 134* 132* 165*   HDL 60.7 88.9 65.7 56.1 48.3 53.0   TRIG 196*  135 147 107 183* 154*     MICRO:   Recent Labs     09/30/24  1518 08/14/24  0852 07/01/24  1308 05/14/24  0744 04/18/24  1006   CRP 0.18 <0.10 0.21 <0.10 <0.10   ESR normalized     I have personally reviewed most recent PCP, cardiology, vascular, and/or podiatry documentation.      Assessment/Plan   63 y.o. female with  BUE sxs R>L with subclavian occlusion on RUE  in the background of dyslipidemia, hypertension, and arteritis (GCA  or Takaysu - temporal artery biopsy negative) .    Her inflammatory markers have improved.  I have placed the communication to Dr. Lombardi regarding whether additional anti-inflammatory therapy will help before invasive therapy is offered.  I think overall it is limited due to significant medication intolerances.    Patient also reports significant migraine symptoms.  This has been present for many years of her life.  She was also told at some point in her past that she has a PFO, our last echocardiogram did not have bubble study incorporated.    Plan:  I would like to obtain formal CTA of the upper extremities to outlying the distribution of disease bilaterally.   We have tentatively scheduled 12/9/24 as a date for procedure depending on timing of CTA above.  I would like to have the patient trial Plavix before she comes for procedure to ensure she can tolerate.  I have asked her to separate the trial from her Zetia trial noted below such that we would know if she has symptoms from either of these medications.  I have also sent for repeat CRP/ESR in addition to our traditional preprocedural labs (BMP, CBC).  We will perform a trial of Zetia.  I did have asked patient to consider PCSK9.  We may ultimately want to involve lipidology for assistance.  When patient is with us in the Cath Lab in December, we will plan for a limited echo with PFO evaluation.  There is some literature to support PFO closure in the presence of severe migraine symptoms.         Theo Campo MD, FAC, [facility],  ARYA  Co-Director, Vascular Center, and  Co-Director, Pulmonary Embolism Response Team,   [facility]                                 of Medicine,                                                                 [facility] [employer]

## 2024-11-14 NOTE — LETTER
November 15, 2024     Jaci Hernandez MD  [address] [place] [id number]    Patient: Kimberley Murillo   YOB: 1960   Date of Visit: 11/14/2024       Dear Dr. Jaci Hernandez MD:    Thank you for referring Kimberley Murillo to me for evaluation. Below are my notes for this consultation.  If you have questions, please do not hesitate to call me. I look forward to following your patient along with you.       Sincerely,     Theo Campo MD      CC: Burke Tesfaye, DO Kelsey Lombardi MD  ______________________________________________________________________________________    PCP: Jaci Hernandez MD  VS: Mera  Rheum: Harjit    Subjective  Kimberley Murillo is a 63 y.o. female who complains of BUE claudication R>L .  Patient underwent temporal artery biopsy without evidence of giant cell arteritis.  She underwent steroid treatment with improvement in CRP and ESR (9/30/24 labs 0.18 and <1, respectively).  However, patient states she did not have any improvement in upper extremity symptoms despite steroid course.  She states both hands are turning ashen white intermittently, and +claudication preventing her from performing certain chores at home (baking cookies), with R>LUE sxs.  She states that the LUE is starting to become worse.    Patient has been undergoing therapy with Dr. Elder in rheumatology.  Patient is referred for stent placement for ongoing symptoms of the R>L upper extremity.    Notably, patient has some difficulty with certain medications, including statins.  She lives in the [facility].    Review of Systems:  Otherwise, limited cardiovascular review of systems is negative.    Past Medical History:  She has a past medical history of Headache, unspecified, Personal history of diseases of the blood and blood-forming organs and certain disorders involving the immune mechanism, and Personal history of pneumonia (recurrent).    Surgical History:   She has a past surgical  "history that includes Other surgical history (11/03/2022); Other surgical history (11/03/2022); Other surgical history (11/03/2022); Other surgical history (11/03/2022); Other surgical history (11/03/2022); Other surgical history (11/03/2022); Other surgical history (11/03/2022); BI stereotactic guided breast right localization and biopsy (Right, 12/18/2023); and Breast biopsy (Right).    Family History:   No family history on file.    Social History:   Tobacco Use: Low Risk  (11/14/2024)    Patient History    • Smoking Tobacco Use: Never    • Smokeless Tobacco Use: Never    • Passive Exposure: Not on file       Outpatient Medications:    Current Outpatient Medications:   •  amLODIPine (Norvasc) 10 mg tablet, Take 1 tablet (10 mg) by mouth once daily., Disp: 90 tablet, Rfl: 3  •  blood-glucose sensor (FreeStyle Chava 3 Sensor) device, APPLY 1 SENSOR TO THE BACK OF THE ARM FOR CONTINUOUS BLOOD SUGAR MONITORING. REMOVE SENSOR AND REPLACE WITH NEW SENSOR EVERY 14 DAYS., Disp: 2 each, Rfl: 3  •  chlorthalidone (Hygroton) 25 mg tablet, Take 1 tablet (25 mg) by mouth once daily., Disp: , Rfl:   •  losartan (Cozaar) 100 mg tablet, Take 1 tablet (100 mg) by mouth early in the morning.., Disp: , Rfl:   •  metFORMIN XR (Glucophage-XR) 500 mg 24 hr tablet, Take 1 tablet (500 mg) by mouth 2 times daily (morning and late afternoon)., Disp: 180 tablet, Rfl: 1  •  tocilizumab (Actemra ACTPen) 162 mg/0.9 mL, Inject 0.9 mL (162 mg) under the skin every 7 days., Disp: 12 each, Rfl: 3  •  Unithroid 125 mcg tablet, Take 1 tablet (125 mcg) by mouth once daily., Disp: 90 tablet, Rfl: 0     Allergies:  Meloxicam, Salsalate, Glimepiride, Hydralazine, Labetalol, Lisinopril, Statins-hmg-coa reductase inhibitors, Adhesive, Adhesive tape-silicones, and Latex       Objective  Vital Signs:  /90 (BP Location: Right leg, Patient Position: Sitting, BP Cuff Size: Adult)   Pulse 73   Ht 1.651 m (5' 5\")   Wt 85.5 kg (188 lb 9.6 oz)   SpO2 " 98%   BMI 31.38 kg/m²     Physical Exam:  General: no acute distress  HEENT: EOMI, no scleral icterus.  Lungs: Clear to auscultation bilaterally without wheezing, rales, or rhonchi.  Cardiovascular: Regular rate and rhythm, soft ejection murmur.  There are bilateral subclavian bruits  Abdomen: Soft, nontender, nondistended. Bowel sounds present.  Extremities: The upper extremities are warm, decreased radial pulses bilaterally.  There is 2+ PT bilaterally.  Neurologic: Alert and oriented x3.    Pertinent Recent Cardiovascular Studies (personally reviewed):  Vascular studies:  ARIAN/TBI UE 1/24/24:   Dampened waveforms BUE with diminished brachial pressures      Renal duplex 9/16/24: normal velocities and size of kidneys bilaterally    Cardiac studies:  Echocardiogram 12/7/23:  Left and right functions are preserved.  Asc Ao 3.3 cm. No PFO eval.    Laboratory values:  CMP:  Recent Labs     08/14/24  0852 07/01/24  1308 06/04/24  1257 05/14/24  0744 03/16/24  0833 01/31/24  1613 12/29/23  1128 05/22/23  0951 08/18/22  1159 07/25/22  0940    139 142 143 138 140 139 139   < > 141   K 3.8 3.8 3.3* 3.8 4.5 3.9 4.2 4.1   < > 3.8    102 106 104 99 103 102 105   < > 102   CO2 32 26 29 30 29 27 31 28   < > 29   ANIONGAP 10 15 10 13 15 14 10 10   < > 14   BUN 11 16 13 15 16 12 9 9   < > 9   CREATININE 0.82 0.74 0.65 0.65 0.63 0.73 0.61 0.75   < > 0.74   EGFR 81 >90 >90 >90 >90 >90 >90  --   --   --    MG  --   --  2.00  --   --   --   --   --   --  2.09    < > = values in this interval not displayed.     Recent Labs     08/14/24  0852 07/01/24  1308 06/04/24  1257 05/14/24  0744 03/16/24  0833   ALBUMIN 4.3 4.8 4.6 4.0 3.7   ALKPHOS 73 59 47 45 78   ALT 35 25 32 35 28   AST 25 16 19 17 12   BILITOT 1.0 0.6 0.7 0.6 0.6     CBC:  Recent Labs     08/14/24  0852 07/01/24  1308 06/04/24  1257 05/14/24  0744 03/16/24  0833 01/31/24  1613 07/19/23  0853   WBC 5.3 10.3 7.3 7.9 13.6* 9.1 7.9   HGB 12.7 14.2 13.9 12.6 13.0  12.3 12.9   HCT 39.0 41.6 41.0 39.7 40.2 39.3 40.7    321 223 209 266 379 356   MCV 92 91 95 95 85 86 89     HEME/ENDO:  Recent Labs     05/20/24  1542 12/29/23  1128 05/22/23  0951 07/25/22  0940 11/09/21  0903 08/05/20  0916   TSH  --   --  1.50 9.39* 1.78 1.03   HGBA1C 6.9* 7.8* 6.6* 7.0*  --   --       CARDIAC:   Recent Labs     06/04/24  1440 06/04/24  1257   TROPHS 8 7     Recent Labs     08/14/24  0852 05/14/24  0744 03/16/24  0833 05/22/23  0951 07/25/22  0940 11/09/21  0903   CHOL 259* 256* 223* 211* 217* 249*   LDLF 159* 140* 128* 134* 132* 165*   HDL 60.7 88.9 65.7 56.1 48.3 53.0   TRIG 196* 135 147 107 183* 154*     MICRO:   Recent Labs     09/30/24  1518 08/14/24  0852 07/01/24  1308 05/14/24  0744 04/18/24  1006   CRP 0.18 <0.10 0.21 <0.10 <0.10   ESR normalized     I have personally reviewed most recent PCP, cardiology, vascular, and/or podiatry documentation.      Assessment/Plan  63 y.o. female with  BUE sxs R>L with subclavian occlusion on RUE  in the background of dyslipidemia, hypertension, and arteritis (GCA  or Takaysu - temporal artery biopsy negative) .    Her inflammatory markers have improved.  I have placed the communication to Dr. Lombardi regarding whether additional anti-inflammatory therapy will help before invasive therapy is offered.  I think overall it is limited due to significant medication intolerances.    Patient also reports significant migraine symptoms.  This has been present for many years of her life.  She was also told at some point in her past that she has a PFO, our last echocardiogram did not have bubble study incorporated.    Plan:  I would like to obtain formal CTA of the upper extremities to outlying the distribution of disease bilaterally.   We have tentatively scheduled 12/9/24 as a date for procedure depending on timing of CTA above.  I would like to have the patient trial Plavix before she comes for procedure to ensure she can tolerate.  I have asked  her to separate the trial from her Zetia trial noted below such that we would know if she has symptoms from either of these medications.  I have also sent for repeat CRP/ESR in addition to our traditional preprocedural labs (BMP, CBC).  We will perform a trial of Zetia.  I did have asked patient to consider PCSK9.  We may ultimately want to involve lipidology for assistance.  When patient is with us in the Cath Lab in December, we will plan for a limited echo with PFO evaluation.  There is some literature to support PFO closure in the presence of severe migraine symptoms.         Theo Campo MD, FACC, FSCAI, RPVI  Co-Director, Vascular Center, and  Co-Director, Pulmonary Embolism Response Team,   [facility] [place]                                 of Medicine,                                                                 [employer]

## 2024-11-14 NOTE — PATIENT INSTRUCTIONS
CT scan of the upper extremities for planning  Consider rizatriptan 10 mg as needed for migraine abortive measures (with aura)  Stagger Plavix and Zetia trial to see if you tolerate    We will have you come back for the procedure for the artery on 12/9/24 at [facility].     Medications:  If you are on a blood thinner (like Eliquis and Xarelto), please hold it 2 days before procedure.  If you are on warfarin, please hold it 3 days before procedure.  If you are in aspirin, Plavix (clopidogrel), Effient (prasugrel), or Brilinta (ticagrelor), please continue through procedure.    If you are diabetic on oral pills: please hold your morning oral diabetes medications.  If you are diabetic on insulin: take half dose of your long acting insulin the night before and hold your short acting insulin and oral diabetes medications on the morning of.    Otherwise, you may continue your medications in the morning with sips of water.     Other Instructions:  Nothing to eat 2 hours prior to procedure. Our cath lab nurses will contact you the day before the procedure for further instructions.   If help needed, call [phone], Option 4 for [facility] -[phone], Option 1 (for [facility] M-F, 6:30 AM-5 PM).      Theo Campo MD, FACC, FSCAI, RPVI  Co-Director, Vascular Center, and  Co-Director, Pulmonary Embolism Response Team,   [facility] [place]                                 of Medicine,                                                                 [facility] [employer]

## 2024-11-15 LAB
CRP SERPL HS-MCNC: 0.6 MG/L
ERYTHROCYTE [SEDIMENTATION RATE] IN BLOOD BY WESTERGREN METHOD: <1 MM/H (ref 0–30)

## 2024-11-18 ENCOUNTER — HOSPITAL ENCOUNTER (OUTPATIENT)
Dept: RADIOLOGY | Facility: CLINIC | Age: 64
Discharge: HOME | End: 2024-11-18
Payer: COMMERCIAL

## 2024-11-18 DIAGNOSIS — I77.1 SUBCLAVIAN ARTERIAL STENOSIS: ICD-10-CM

## 2024-11-18 PROCEDURE — 2550000001 HC RX 255 CONTRASTS: Performed by: INTERNAL MEDICINE

## 2024-11-18 PROCEDURE — 73206 CT ANGIO UPR EXTRM W/O&W/DYE: CPT | Mod: LT

## 2024-11-18 PROCEDURE — 73206 CT ANGIO UPR EXTRM W/O&W/DYE: CPT | Mod: LEFT SIDE | Performed by: RADIOLOGY

## 2024-11-18 PROCEDURE — 73206 CT ANGIO UPR EXTRM W/O&W/DYE: CPT | Mod: RT

## 2024-12-02 ENCOUNTER — APPOINTMENT (OUTPATIENT)
Dept: PRIMARY CARE | Facility: CLINIC | Age: 64
End: 2024-12-02
Payer: COMMERCIAL

## 2024-12-02 VITALS
SYSTOLIC BLOOD PRESSURE: 145 MMHG | WEIGHT: 190.3 LBS | BODY MASS INDEX: 31.71 KG/M2 | DIASTOLIC BLOOD PRESSURE: 99 MMHG | HEART RATE: 72 BPM | TEMPERATURE: 97.3 F | HEIGHT: 65 IN

## 2024-12-02 DIAGNOSIS — E78.00 HYPERCHOLESTEROLEMIA: ICD-10-CM

## 2024-12-02 DIAGNOSIS — I73.9 PERIPHERAL VASCULAR DISEASE, UNSPECIFIED (CMS-HCC): ICD-10-CM

## 2024-12-02 DIAGNOSIS — E11.65 TYPE 2 DIABETES MELLITUS WITH HYPERGLYCEMIA, WITHOUT LONG-TERM CURRENT USE OF INSULIN: ICD-10-CM

## 2024-12-02 DIAGNOSIS — E03.9 ACQUIRED HYPOTHYROIDISM: ICD-10-CM

## 2024-12-02 DIAGNOSIS — T46.6X5D ADVERSE EFFECT OF ANTIHYPERLIPIDEMIC DRUG, SUBSEQUENT ENCOUNTER: ICD-10-CM

## 2024-12-02 DIAGNOSIS — I10 BENIGN ESSENTIAL HYPERTENSION: Primary | ICD-10-CM

## 2024-12-02 PROBLEM — T46.6X5A ADVERSE REACTION TO ANTIHYPERLIPIDEMIC DRUG: Status: ACTIVE | Noted: 2024-12-02

## 2024-12-02 PROCEDURE — 3080F DIAST BP >= 90 MM HG: CPT | Performed by: INTERNAL MEDICINE

## 2024-12-02 PROCEDURE — 99214 OFFICE O/P EST MOD 30 MIN: CPT | Performed by: INTERNAL MEDICINE

## 2024-12-02 PROCEDURE — 3050F LDL-C >= 130 MG/DL: CPT | Performed by: INTERNAL MEDICINE

## 2024-12-02 PROCEDURE — 3077F SYST BP >= 140 MM HG: CPT | Performed by: INTERNAL MEDICINE

## 2024-12-02 PROCEDURE — 1036F TOBACCO NON-USER: CPT | Performed by: INTERNAL MEDICINE

## 2024-12-02 PROCEDURE — 3008F BODY MASS INDEX DOCD: CPT | Performed by: INTERNAL MEDICINE

## 2024-12-02 PROCEDURE — 4010F ACE/ARB THERAPY RXD/TAKEN: CPT | Performed by: INTERNAL MEDICINE

## 2024-12-02 ASSESSMENT — ENCOUNTER SYMPTOMS
NAUSEA: 0
SPEECH DIFFICULTY: 0
ACTIVITY CHANGE: 0
ABDOMINAL PAIN: 0
NUMBNESS: 1
FEVER: 0
ARTHRALGIAS: 1
WEAKNESS: 1
TREMORS: 0
VOMITING: 0
SHORTNESS OF BREATH: 0
CHILLS: 0
COUGH: 0
DIARRHEA: 0
DIZZINESS: 0
CHEST TIGHTNESS: 0
CONSTIPATION: 0
CHOKING: 0

## 2024-12-02 NOTE — PROGRESS NOTES
CHIEF COMPLAINT  Establish Care and Hypertension    HISTORY OF PRESENT ILLNESS  Kimberley Murillo is a 64 y.o. female presents today for follow up of Establish Care and Hypertension    HPI    New patient to me for primary care.  History reviewed and updated.    Sees Dr. Elder for Giant Cell Arteritis.  Diabetes was out of control when she was on steroids, required 5 insulin shots per day.  She is not on Actemra instead of prednisone and blood sugar is much better.  She has problem with circulation in hands - hands are cold, worse at night.  Imaging revealed bilateral subclavian stenosis - she is following with Dr. Campo and scheduled for catheterization next week.     Difficulty with BP control.  Amlodipine - hair loss, stopped 2 weeks ago  Chlorthalidone - only uses PRN when she feels swollen; causes severe leg cramps  Had parodoxical effect with labetalol, SBP > 240.  Has had renal artery duplex which came back OK.  Due for thyroid check.     REVIEW OF SYSTEMS  Review of Systems   Constitutional:  Negative for activity change, chills and fever.   Respiratory:  Negative for cough, choking, chest tightness and shortness of breath.    Cardiovascular:  Negative for chest pain and leg swelling.   Gastrointestinal:  Negative for abdominal pain, constipation, diarrhea, nausea and vomiting.   Musculoskeletal:  Positive for arthralgias.   Skin:  Negative for rash.   Neurological:  Positive for weakness (Right upper and left upper extremity) and numbness (Tingling and intermittent numbness right forearm and hand). Negative for dizziness, tremors and speech difficulty.       ALLERGIES   Meloxicam, Salsalate, Glimepiride, Hydralazine, Labetalol, Lisinopril, Statins-hmg-coa reductase inhibitors, Adhesive, Adhesive tape-silicones, and Latex    MEDICATIONS  Current Outpatient Medications   Medication Instructions    Actemra ACTPen 162 mg, subcutaneous, Every 7 days    amLODIPine (NORVASC) 10 mg, oral, Daily    blood-glucose sensor  "(FreeStyle Chava 3 Sensor) device APPLY 1 SENSOR TO THE BACK OF THE ARM FOR CONTINUOUS BLOOD SUGAR MONITORING. REMOVE SENSOR AND REPLACE WITH NEW SENSOR EVERY 14 DAYS.    chlorthalidone (HYGROTON) 25 mg, Daily RT    clopidogrel (PLAVIX) 75 mg, oral, Daily    ezetimibe (ZETIA) 10 mg, oral, Daily    losartan (Cozaar) 100 mg tablet 1 tablet, Daily (0630)    metFORMIN XR (GLUCOPHAGE-XR) 500 mg, oral, 2 times daily (morning and late afternoon)    Unithroid 125 mcg, oral, Daily       TOBACCO USE  Social History     Tobacco Use   Smoking Status Never   Smokeless Tobacco Never       DEPRESSION SCREEN  Over the past 2 weeks, how often have you been bothered by any of the following problems?  Little interest or pleasure in doing things: Not at all  Feeling down, depressed, or hopeless: Not at all    SURGICAL HISTORY  Past Surgical History:  12/18/2023: BI STEREOTACTIC GUIDED BREAST RIGHT LOCALIZATION AND   BIOPSY; Right      Comment:  BI STEREOTACTIC GUIDED BREAST RIGHT LOCALIZATION AND                BIOPSY 12/18/2023 Cori Villarreal MD [facility]  No date: BREAST BIOPSY; Right      Comment:  Excisional  11/03/2022: OTHER SURGICAL HISTORY      Comment:  Cholecystectomy  11/03/2022: OTHER SURGICAL HISTORY      Comment:  Tonsillectomy  11/03/2022: OTHER SURGICAL HISTORY      Comment:  Lower leg fracture repair  11/03/2022: OTHER SURGICAL HISTORY      Comment:  Thyroidectomy  11/03/2022: OTHER SURGICAL HISTORY      Comment:  Lithotripsy  11/03/2022: OTHER SURGICAL HISTORY      Comment:  Hip labral tear repair  11/03/2022: OTHER SURGICAL HISTORY      Comment:  Lumpectomy       OBJECTIVE    BP (!) 145/99   Pulse 72   Temp 36.3 °C (97.3 °F)   Ht 1.651 m (5' 5\")   Wt 86.3 kg (190 lb 4.8 oz)   BMI 31.67 kg/m²    BMI: Estimated body mass index is 31.67 kg/m² as calculated from the following:    Height as of this encounter: 1.651 m (5' 5\").    Weight as of this encounter: 86.3 kg (190 lb 4.8 oz).    BP Readings from Last 3 Encounters: "   12/02/24 (!) 145/99   11/14/24 180/90   09/30/24 148/90      Wt Readings from Last 3 Encounters:   12/02/24 86.3 kg (190 lb 4.8 oz)   11/14/24 85.5 kg (188 lb 9.6 oz)   09/30/24 87.1 kg (192 lb)        PHYSICAL EXAM  Physical Exam  Vitals reviewed.   Constitutional:       Appearance: Normal appearance.   HENT:      Head: Normocephalic and atraumatic.      Mouth/Throat:      Mouth: Mucous membranes are moist.      Pharynx: Oropharynx is clear.   Eyes:      Extraocular Movements: Extraocular movements intact.      Conjunctiva/sclera: Conjunctivae normal.      Pupils: Pupils are equal, round, and reactive to light.      Comments: glasses   Cardiovascular:      Rate and Rhythm: Normal rate and regular rhythm.      Heart sounds: Murmur heard.      Comments: Bilateral radial pulse 1+  Hands are warm, however fingertips are cool  Pulmonary:      Effort: Pulmonary effort is normal. No respiratory distress.      Breath sounds: Normal breath sounds. No wheezing.   Abdominal:      General: Bowel sounds are normal. There is no distension.      Palpations: Abdomen is soft.      Tenderness: There is no abdominal tenderness.   Musculoskeletal:      Right lower leg: No edema.      Left lower leg: No edema.   Skin:     General: Skin is warm and dry.   Neurological:      General: No focal deficit present.      Mental Status: She is alert and oriented to person, place, and time. Mental status is at baseline.   Psychiatric:         Mood and Affect: Mood normal.         Behavior: Behavior normal.         Thought Content: Thought content normal.         Judgment: Judgment normal.          ASSESSMENT AND PLAN  Assessment/Plan   Problem List Items Addressed This Visit       Type 2 diabetes mellitus, without long-term current use of insulin (Multi)    Overview     - previously on insulin, had severe hyperglycemia when on steroids for GCA         Relevant Orders    Hemoglobin A1c    Benign essential hypertension - Primary    Overview      Managed by cardiologist           Acquired hypothyroidism    Relevant Orders    Thyroid Stimulating Hormone    Hypercholesterolemia    Peripheral vascular disease, unspecified (CMS-HCC)    Overview     Dr. Campo         Adverse reaction to antihyperlipidemic drug       DM2 - check A1c.  Well controlled per reported home monitoring.  - on ARB.  - does not tolerate statins    Hypertension - difficult to control, side effects with several meds.  Will need follow-up after revascularization of upper extremities.    Hypothyroidism - check TSH, adjust levothyroxine as necessary.     Follow-up 6 months.

## 2024-12-05 ENCOUNTER — LAB (OUTPATIENT)
Dept: LAB | Facility: LAB | Age: 64
End: 2024-12-05
Payer: COMMERCIAL

## 2024-12-05 ENCOUNTER — APPOINTMENT (OUTPATIENT)
Dept: CARDIOLOGY | Facility: CLINIC | Age: 64
End: 2024-12-05
Payer: COMMERCIAL

## 2024-12-05 ENCOUNTER — APPOINTMENT (OUTPATIENT)
Dept: LAB | Facility: LAB | Age: 64
End: 2024-12-05
Payer: COMMERCIAL

## 2024-12-05 DIAGNOSIS — E03.9 ACQUIRED HYPOTHYROIDISM: ICD-10-CM

## 2024-12-05 DIAGNOSIS — E11.65 TYPE 2 DIABETES MELLITUS WITH HYPERGLYCEMIA, WITHOUT LONG-TERM CURRENT USE OF INSULIN: ICD-10-CM

## 2024-12-05 PROCEDURE — 36415 COLL VENOUS BLD VENIPUNCTURE: CPT

## 2024-12-05 PROCEDURE — 83036 HEMOGLOBIN GLYCOSYLATED A1C: CPT

## 2024-12-05 PROCEDURE — 84443 ASSAY THYROID STIM HORMONE: CPT

## 2024-12-06 DIAGNOSIS — E03.9 HYPOTHYROIDISM, UNSPECIFIED TYPE: ICD-10-CM

## 2024-12-06 LAB
EST. AVERAGE GLUCOSE BLD GHB EST-MCNC: 120 MG/DL
HBA1C MFR BLD: 5.8 %
TSH SERPL-ACNC: 0.07 MIU/L (ref 0.44–3.98)

## 2024-12-06 RX ORDER — LEVOTHYROXINE SODIUM 112 UG/1
112 TABLET ORAL DAILY
Qty: 90 TABLET | Refills: 0 | Status: SHIPPED | OUTPATIENT
Start: 2024-12-06 | End: 2025-03-06

## 2024-12-09 ENCOUNTER — APPOINTMENT (OUTPATIENT)
Dept: CARDIOLOGY | Facility: HOSPITAL | Age: 64
End: 2024-12-09
Payer: COMMERCIAL

## 2024-12-09 ENCOUNTER — HOSPITAL ENCOUNTER (OUTPATIENT)
Facility: HOSPITAL | Age: 64
Setting detail: OUTPATIENT SURGERY
Discharge: HOME | End: 2024-12-09
Attending: INTERNAL MEDICINE | Admitting: INTERNAL MEDICINE
Payer: COMMERCIAL

## 2024-12-09 ENCOUNTER — PREP FOR PROCEDURE (OUTPATIENT)
Dept: CARDIOLOGY | Facility: CLINIC | Age: 64
End: 2024-12-09
Payer: COMMERCIAL

## 2024-12-09 VITALS
OXYGEN SATURATION: 100 % | WEIGHT: 182.98 LBS | BODY MASS INDEX: 30.49 KG/M2 | SYSTOLIC BLOOD PRESSURE: 215 MMHG | DIASTOLIC BLOOD PRESSURE: 84 MMHG | HEIGHT: 65 IN | TEMPERATURE: 98.2 F | RESPIRATION RATE: 16 BRPM | HEART RATE: 86 BPM

## 2024-12-09 DIAGNOSIS — I73.9 PERIPHERAL VASCULAR DISEASE, UNSPECIFIED (CMS-HCC): ICD-10-CM

## 2024-12-09 DIAGNOSIS — G43.909 MIGRAINE HEADACHE: ICD-10-CM

## 2024-12-09 DIAGNOSIS — Z01.89 ENCOUNTER FOR OTHER SPECIFIED SPECIAL EXAMINATIONS: ICD-10-CM

## 2024-12-09 DIAGNOSIS — I70.218 ATHEROSCLEROSIS OF NATIVE ARTERIES OF EXTREMITIES WITH INTERMITTENT CLAUDICATION, OTHER EXTREMITY (CMS-HCC): ICD-10-CM

## 2024-12-09 DIAGNOSIS — I73.9 PERIPHERAL VASCULAR DISEASE, UNSPECIFIED (CMS-HCC): Primary | ICD-10-CM

## 2024-12-09 DIAGNOSIS — I77.1 SUBCLAVIAN ARTERIAL STENOSIS: Primary | ICD-10-CM

## 2024-12-09 DIAGNOSIS — Z98.62 S/P PERIPHERAL ARTERY ANGIOPLASTY: ICD-10-CM

## 2024-12-09 LAB
EJECTION FRACTION APICAL 4 CHAMBER: 61.5
EJECTION FRACTION: 63 %
INR PPP: 0.9 (ref 0.9–1.1)
PROTHROMBIN TIME: 10.1 SECONDS (ref 9.8–12.8)

## 2024-12-09 PROCEDURE — 7100000009 HC PHASE TWO TIME - INITIAL BASE CHARGE: Performed by: INTERNAL MEDICINE

## 2024-12-09 PROCEDURE — 36415 COLL VENOUS BLD VENIPUNCTURE: CPT | Performed by: INTERNAL MEDICINE

## 2024-12-09 PROCEDURE — 99153 MOD SED SAME PHYS/QHP EA: CPT | Performed by: INTERNAL MEDICINE

## 2024-12-09 PROCEDURE — 93321 DOPPLER ECHO F-UP/LMTD STD: CPT | Performed by: STUDENT IN AN ORGANIZED HEALTH CARE EDUCATION/TRAINING PROGRAM

## 2024-12-09 PROCEDURE — 2500000004 HC RX 250 GENERAL PHARMACY W/ HCPCS (ALT 636 FOR OP/ED): Performed by: INTERNAL MEDICINE

## 2024-12-09 PROCEDURE — 75710 ARTERY X-RAYS ARM/LEG: CPT | Mod: 59 | Performed by: INTERNAL MEDICINE

## 2024-12-09 PROCEDURE — 99152 MOD SED SAME PHYS/QHP 5/>YRS: CPT | Performed by: INTERNAL MEDICINE

## 2024-12-09 PROCEDURE — 88304 TISSUE EXAM BY PATHOLOGIST: CPT | Mod: TC,PARLAB | Performed by: NURSE PRACTITIONER

## 2024-12-09 PROCEDURE — 37252 INTRVASC US NONCORONARY 1ST: CPT | Performed by: INTERNAL MEDICINE

## 2024-12-09 PROCEDURE — 88304 TISSUE EXAM BY PATHOLOGIST: CPT | Performed by: PATHOLOGY

## 2024-12-09 PROCEDURE — 37253 INTRVASC US NONCORONARY ADDL: CPT | Performed by: INTERNAL MEDICINE

## 2024-12-09 PROCEDURE — 2500000005 HC RX 250 GENERAL PHARMACY W/O HCPCS: Performed by: INTERNAL MEDICINE

## 2024-12-09 PROCEDURE — C2623 CATH, TRANSLUMIN, DRUG-COAT: HCPCS | Performed by: INTERNAL MEDICINE

## 2024-12-09 PROCEDURE — 7100000010 HC PHASE TWO TIME - EACH INCREMENTAL 1 MINUTE: Performed by: INTERNAL MEDICINE

## 2024-12-09 PROCEDURE — 2720000007 HC OR 272 NO HCPCS: Performed by: INTERNAL MEDICINE

## 2024-12-09 PROCEDURE — C1769 GUIDE WIRE: HCPCS | Performed by: INTERNAL MEDICINE

## 2024-12-09 PROCEDURE — C1887 CATHETER, GUIDING: HCPCS | Performed by: INTERNAL MEDICINE

## 2024-12-09 PROCEDURE — C1729 CATH, DRAINAGE: HCPCS | Performed by: INTERNAL MEDICINE

## 2024-12-09 PROCEDURE — 37246 TRLUML BALO ANGIOP 1ST ART: CPT | Performed by: INTERNAL MEDICINE

## 2024-12-09 PROCEDURE — C1760 CLOSURE DEV, VASC: HCPCS | Performed by: INTERNAL MEDICINE

## 2024-12-09 PROCEDURE — 2780000003 HC OR 278 NO HCPCS: Performed by: INTERNAL MEDICINE

## 2024-12-09 PROCEDURE — 93325 DOPPLER ECHO COLOR FLOW MAPG: CPT | Performed by: STUDENT IN AN ORGANIZED HEALTH CARE EDUCATION/TRAINING PROGRAM

## 2024-12-09 PROCEDURE — 0237T TRLUML PERIP ATHRC BRCHIOCPH: CPT | Mod: RT | Performed by: INTERNAL MEDICINE

## 2024-12-09 PROCEDURE — C1725 CATH, TRANSLUMIN NON-LASER: HCPCS | Performed by: INTERNAL MEDICINE

## 2024-12-09 PROCEDURE — C1894 INTRO/SHEATH, NON-LASER: HCPCS | Performed by: INTERNAL MEDICINE

## 2024-12-09 PROCEDURE — 88313 SPECIAL STAINS GROUP 2: CPT | Performed by: PATHOLOGY

## 2024-12-09 PROCEDURE — 2550000001 HC RX 255 CONTRASTS: Performed by: INTERNAL MEDICINE

## 2024-12-09 PROCEDURE — 75710 ARTERY X-RAYS ARM/LEG: CPT | Performed by: INTERNAL MEDICINE

## 2024-12-09 PROCEDURE — C1714 CATH, TRANS ATHERECTOMY, DIR: HCPCS | Performed by: INTERNAL MEDICINE

## 2024-12-09 PROCEDURE — 37247 TRLUML BALO ANGIOP ADDL ART: CPT | Performed by: INTERNAL MEDICINE

## 2024-12-09 PROCEDURE — 85610 PROTHROMBIN TIME: CPT | Performed by: INTERNAL MEDICINE

## 2024-12-09 PROCEDURE — 93308 TTE F-UP OR LMTD: CPT | Performed by: STUDENT IN AN ORGANIZED HEALTH CARE EDUCATION/TRAINING PROGRAM

## 2024-12-09 PROCEDURE — G0269 OCCLUSIVE DEVICE IN VEIN ART: HCPCS | Performed by: INTERNAL MEDICINE

## 2024-12-09 PROCEDURE — 93325 DOPPLER ECHO COLOR FLOW MAPG: CPT

## 2024-12-09 PROCEDURE — 96373 THER/PROPH/DIAG INJ IA: CPT | Performed by: INTERNAL MEDICINE

## 2024-12-09 PROCEDURE — 37247 TRLUML BALO ANGIOP ADDL ART: CPT | Mod: RT | Performed by: INTERNAL MEDICINE

## 2024-12-09 PROCEDURE — 37246 TRLUML BALO ANGIOP 1ST ART: CPT | Mod: RT | Performed by: INTERNAL MEDICINE

## 2024-12-09 PROCEDURE — C1753 CATH, INTRAVAS ULTRASOUND: HCPCS | Performed by: INTERNAL MEDICINE

## 2024-12-09 PROCEDURE — C1884 EMBOLIZATION PROTECT SYST: HCPCS | Performed by: INTERNAL MEDICINE

## 2024-12-09 PROCEDURE — 2500000001 HC RX 250 WO HCPCS SELF ADMINISTERED DRUGS (ALT 637 FOR MEDICARE OP): Performed by: INTERNAL MEDICINE

## 2024-12-09 RX ORDER — METFORMIN HYDROCHLORIDE 500 MG/1
500 TABLET ORAL
COMMUNITY
End: 2024-12-27 | Stop reason: WASHOUT

## 2024-12-09 RX ORDER — ASPIRIN 325 MG
325 TABLET ORAL ONCE
Status: COMPLETED | OUTPATIENT
Start: 2024-12-09 | End: 2024-12-09

## 2024-12-09 RX ORDER — FENTANYL CITRATE 50 UG/ML
INJECTION, SOLUTION INTRAMUSCULAR; INTRAVENOUS AS NEEDED
Status: DISCONTINUED | OUTPATIENT
Start: 2024-12-09 | End: 2024-12-09 | Stop reason: HOSPADM

## 2024-12-09 RX ORDER — LIDOCAINE HYDROCHLORIDE 20 MG/ML
INJECTION, SOLUTION INFILTRATION; PERINEURAL AS NEEDED
Status: DISCONTINUED | OUTPATIENT
Start: 2024-12-09 | End: 2024-12-09 | Stop reason: HOSPADM

## 2024-12-09 RX ORDER — ASPIRIN 325 MG
325 TABLET ORAL ONCE
OUTPATIENT
Start: 2024-12-09 | End: 2024-12-09

## 2024-12-09 RX ORDER — SODIUM NITROPRUSSIDE 25 MG/ML
INJECTION INTRAVENOUS CONTINUOUS PRN
Status: COMPLETED | OUTPATIENT
Start: 2024-12-09 | End: 2024-12-09

## 2024-12-09 RX ORDER — MIDAZOLAM HYDROCHLORIDE 1 MG/ML
INJECTION, SOLUTION INTRAMUSCULAR; INTRAVENOUS AS NEEDED
Status: DISCONTINUED | OUTPATIENT
Start: 2024-12-09 | End: 2024-12-09 | Stop reason: HOSPADM

## 2024-12-09 RX ORDER — CLOPIDOGREL BISULFATE 75 MG/1
300 TABLET ORAL ONCE
OUTPATIENT
Start: 2024-12-09 | End: 2024-12-09

## 2024-12-09 RX ORDER — ACETAMINOPHEN 325 MG/1
650 TABLET ORAL EVERY 6 HOURS PRN
Status: DISCONTINUED | OUTPATIENT
Start: 2024-12-09 | End: 2024-12-09 | Stop reason: HOSPADM

## 2024-12-09 RX ORDER — CLOPIDOGREL BISULFATE 300 MG/1
300 TABLET, FILM COATED ORAL ONCE
Status: COMPLETED | OUTPATIENT
Start: 2024-12-09 | End: 2024-12-09

## 2024-12-09 RX ORDER — HEPARIN SODIUM 1000 [USP'U]/ML
INJECTION, SOLUTION INTRAVENOUS; SUBCUTANEOUS AS NEEDED
Status: DISCONTINUED | OUTPATIENT
Start: 2024-12-09 | End: 2024-12-09 | Stop reason: HOSPADM

## 2024-12-09 RX ORDER — ASPIRIN 81 MG/1
81 TABLET ORAL DAILY
Qty: 90 TABLET | Refills: 1 | Status: SHIPPED | OUTPATIENT
Start: 2024-12-09 | End: 2025-06-07

## 2024-12-09 RX ADMIN — CLOPIDOGREL BISULFATE 300 MG: 300 TABLET, FILM COATED ORAL at 08:48

## 2024-12-09 RX ADMIN — ASPIRIN 325 MG ORAL TABLET 325 MG: 325 PILL ORAL at 08:48

## 2024-12-09 ASSESSMENT — PAIN SCALES - GENERAL
PAINLEVEL_OUTOF10: 0 - NO PAIN
PAINLEVEL_OUTOF10: 6
PAINLEVEL_OUTOF10: 0 - NO PAIN

## 2024-12-09 ASSESSMENT — PAIN - FUNCTIONAL ASSESSMENT
PAIN_FUNCTIONAL_ASSESSMENT: 0-10

## 2024-12-09 NOTE — Clinical Note
Vessel(s): right axillary artery, right brachial artery, right antecubital artery and right subclavian artery. Injected with hand injections. Single view taken.

## 2024-12-09 NOTE — DISCHARGE INSTRUCTIONS
PERIPHERAL ANGIOGRAPHY DISCHARGE INSTRUCTIONS    FOR SUDDEN AND SEVERE CHEST PAIN, SHORTNESS OF BREATH, EXCESSIVE BLEEDING, SIGNS OF STROKE, OR CHANGES IN MENTAL STATUS YOU SHOULD CALL 911 IMMEDIATELY.     If your provider has prescribed aspirin and/or clopidogrel (Plavix), or prasugrel (Effient), or ticagrelor (Brilinta), DO NOT STOP THESE MEDICATIONS for any reason without talking to your cardiologist first. If any of these were prescribed, you must take them every day without missing a single dose. If you are getting low on these medications, contact your provider immediately for a refill.     FOR NEXT 24 HOURS  - Upon discharge, you should return home and rest for the remainder of the day and evening. You do not have to stay on bed rest but should not be very active.  It is recommended a responsible adult be with you for the first 24 hours after the procedure.    - No driving for 24 hours after procedure. Please arrange for someone to drive you home from the hospital today.     - Do not drive, operate machinery, or use power tools for 24 hours after your procedure.     - Do not make any legal decisions for 24 hours after your procedure.     - Do not drink alcoholic beverages for 24 hours after your procedure.    WOUND CARE   *FOR FEMORAL (LEG) ACCESS*  ·      Avoid heavy lifting (over 10 pounds) for 7 days, squatting or excessive bending for 7 days, and strenuous exercise for 7 days.  ·      No submerged bathing, swimming, or hot tubs for the next 7 days, or until fully healed.  ·      Avoid sexual activity for 3-4 days until any groin discomfort has ceased.    - The transparent dressing should be removed from the site 24 hours after the procedure.  Wash the site gently with soap and water. Rinse well and pat dry. Keep the area clean and dry. You may apply a Band-Aid to the site. Avoid lotions, ointments, or powders until fully healed.     - You may shower the day after your procedure.      - It is normal  to notice a small bruise around the puncture site and/or a small grape sized or smaller lump. Any large bruising or large lump warrants a call to the office.     - If bleeding should occur, lay down and apply pressure to the affected area for 10 minutes.  If the bleeding stops notify your physician.  If there is a large amount of bleeding or spurting of blood CALL 911 immediately.  DO NOT drive yourself to the hospital.    - You may experience some tenderness, bruising or minimal inflammation.  If you have any concerns, you may contact the Cath Lab or if any of these symptoms become excessive, contact your cardiologist or go to the emergency room.     OTHER INSTRUCTIONS  - You may take acetaminophen (Tylenol) as directed for discomfort.  If pain is not relieved with acetaminophen (Tylenol), contact your doctor.    - It can be normal to have slight swelling in the leg the procedure was performed on (not the puncture site leg) post-procedure. Elevate the leg as much as possible above the level of your heart. Any excessive swelling, warmth or redness to the leg warrants a call to the office.    - If you notice or experience any of the following, you should notify your doctor or seek medical attention  Chest pain or discomfort  Change in mental status or weakness in extremities.  Dizziness, light headedness, or feeling faint.  Change in the site where the procedure was performed, such as bleeding or an increased area of bruising or swelling.  Tingling, numbness, pain, or coolness in the leg/arm beyond the site where the procedure was performed.  Signs of infection (i.e. shaking chills, temperature > 100 degrees Fahrenheit, warmth, redness) in the leg/arm area where the procedure was performed.  Changes in urination   Bloody or black stools  Vomiting blood  Severe nose bleeds    - Please hold Metformin for 72 hours post procedure, resume 12/12/24.    - Return for procedure of left arm with Dr. Campo 12/23/24 as scheduled,  [phone].

## 2024-12-09 NOTE — H&P
History and Physical   Pre Surgical Review (< 30 days)      History & Physical Reviewed    I have reviewed the History and Physical dated:  11/14/24   History and Physical reviewed and relevant findings noted. Patient examined to review pertinent physical  findings.: No significant changes   Home Medications Reviewed: see medication list below   Allergies Reviewed: no changes noted      Home Medications  Current Outpatient Medications   Medication Instructions    Actemra ACTPen 162 mg, subcutaneous, Every 7 days    amLODIPine (NORVASC) 10 mg, oral, Daily    blood-glucose sensor (FreeStyle Chava 3 Sensor) device APPLY 1 SENSOR TO THE BACK OF THE ARM FOR CONTINUOUS BLOOD SUGAR MONITORING. REMOVE SENSOR AND REPLACE WITH NEW SENSOR EVERY 14 DAYS.    chlorthalidone (HYGROTON) 25 mg, Daily RT    clopidogrel (PLAVIX) 75 mg, oral, Daily    ezetimibe (ZETIA) 10 mg, oral, Daily    losartan (Cozaar) 100 mg tablet 1 tablet, Daily (0630)    metFORMIN (GLUCOPHAGE) 500 mg, 2 times daily (morning and late afternoon)    metFORMIN XR (GLUCOPHAGE-XR) 500 mg, oral, 2 times daily (morning and late afternoon)    Unithroid 112 mcg, oral, Daily, Take on an empty stomach at the same time each day, either 30 to 60 minutes prior to breakfast        Allergies  Allergies   Allergen Reactions    Meloxicam Rash and Shortness of breath    Salsalate Rash and Shortness of breath    Glimepiride Other     Patient noted swelling around her lips when taking     Hydralazine Swelling    Labetalol Headache and Nausea/vomiting    Lisinopril Cough    Statins-Hmg-Coa Reductase Inhibitors GI bleeding     Body ache  Bleeding rectally     Adhesive Rash    Adhesive Tape-Silicones Rash     Includes bandaids    Latex Rash       Physical Exam  Physical Exam  Constitutional:       General: She is not in acute distress.  Cardiovascular:      Rate and Rhythm: Normal rate and regular rhythm.      Comments: + pulse all extremities.  Pulmonary:      Effort:  Pulmonary effort is normal. No respiratory distress.      Comments: Clear bilaterally.  Abdominal:      General: Bowel sounds are normal.   Skin:     General: Skin is warm and dry.   Neurological:      General: No focal deficit present.      Mental Status: She is alert and oriented to person, place, and time.   Psychiatric:         Mood and Affect: Mood normal.         Behavior: Behavior normal.          Airway/Sedation Assessment     Assessment by anesthesia N/A     ·  Mouth Opening OK yes      Neck Flexibility OK yes      Loose Teeth No   ·  Oropharyngeal Classification Grade III   ·  ASA PS Classification ASA III - Patient with severe systemic disease       Sedation Plan Moderate     Risks, benefits, and alternatives discussed with patient.     ERAS (Enhanced Recovery After Surgery):  ·  ERAS Patient: No      Consent:   COVID-19 Consent:  ·  COVID-19 Risk Consent Surgeon has reviewed key risks related to the risk of andry COVID-19 and if they contract COVID-19 what the risks are.     Tresa Ku, APRN-CNP

## 2024-12-09 NOTE — Clinical Note
Vessel(s): right axillary artery, right brachial artery, right antecubital artery, right radial artery and right ulnar artery. Injected with hand injections. Single view taken.

## 2024-12-09 NOTE — NURSING NOTE
Discharge instructions reviewed with patient and family both verbalized understanding. IV access removed. Belongings returned. Site stable with no bleeding or hematoma observed. No complaint of nausea or pain. Patient taken to lobby in wheelchair.

## 2024-12-09 NOTE — Clinical Note
Sheath was exchanged in the right femoral artery with SHEATH, PINSHERLY, W/.038 GW 10CM, 5FR INTRODUCER, 2.5 CM DIALATOR.

## 2024-12-09 NOTE — POST-PROCEDURE NOTE
Physician Transition of Care Summary  Invasive Cardiovascular Lab    Procedure Date: 12/9/2024  Attending:    Emile Campo - Harsh  Resident/Fellow/Other Assistant: Surgeons and Role:  * No surgeons found with a matching role *    Indications:   Pre-op Diagnosis      * Peripheral vascular disease, unspecified (CMS-HCC) [I73.9]    Post-procedure diagnosis:   Post-op Diagnosis     * Peripheral vascular disease, unspecified (CMS-HCC) [I73.9]    Procedure(s):   Upper Extremity Angiogram  64939 - [place] [facility] CATH SUBCLAVIAN ART ANGIO VERTEBRAL ARTERY        Procedure Findings:   S/p Right Upper extremity angiogram with intervention.   IVUS guided, aterectomy and DCB.     Description of the Procedure:   Right CFA 6Fr --> Preclose     Complications:   None     Stents/Implants:   Implants       No implant documentation for this case.            Anticoagulation/Antiplatelet Plan:   DAPT for at least 6 months     Estimated Blood Loss:   * No values recorded between 12/9/2024  9:46 AM and 12/9/2024 12:10 PM *    Anesthesia: Moderate Sedation Anesthesia Staff: No anesthesia staff entered.    Any Specimen(s) Removed:   Order Name Source Comment Collection Info Order Time   PROTIME-INR Blood, Venous  Collected By: Jeffrey Gonzalez RN 12/9/2024  8:38 AM     Release result to [facility]   Immediate        SURGICAL PATHOLOGY EXAM ARTERY BIOPSY  Collected By: Sanam Martinez RN 12/9/2024 11:25 AM     How many specimens will you need? (If more than 16 start a new case)   1          Source of Specimen A:   ARTERY BIOPSY          Procedure:   Right upper extremity intervention          Specify Site:   Right brachial artery          Fixative:   Fresh          Clinical History/Special Request:   Right brachial artery specimen        SURGICAL PATHOLOGY EXAM ARTERY BIOPSY  Collected By: Sanam Martinez RN 12/9/2024 11:25 AM     Source of Specimen A:   ARTERY BIOPSY   right brachial       Fixative:   Fresh          Clinical History/Special Request:    Right UE intervention, right brachial artery specimen          [facility] result to [facility]   Immediate            Disposition:   Home later today.         Electronically signed by: Sara Hernandez MD, 12/9/2024 12:10 PM

## 2024-12-09 NOTE — Clinical Note
Vessel(s): right brachial artery, right radial artery and right ulnar artery. Injected with hand injections.

## 2024-12-09 NOTE — Clinical Note
Chart reviewed.   Immunizations: Triggered Imm Registry     Orders placed: n/a  Upcoming appts to satisfy MIKE topics: n/a       Vessel(s): right brachial artery. Balloon inserted.

## 2024-12-16 ENCOUNTER — APPOINTMENT (OUTPATIENT)
Dept: SURGICAL ONCOLOGY | Facility: HOSPITAL | Age: 64
End: 2024-12-16
Payer: COMMERCIAL

## 2024-12-16 ENCOUNTER — APPOINTMENT (OUTPATIENT)
Dept: RADIOLOGY | Facility: HOSPITAL | Age: 64
End: 2024-12-16
Payer: COMMERCIAL

## 2024-12-17 ENCOUNTER — LAB (OUTPATIENT)
Dept: LAB | Facility: LAB | Age: 64
End: 2024-12-17
Payer: COMMERCIAL

## 2024-12-17 ENCOUNTER — APPOINTMENT (OUTPATIENT)
Dept: RHEUMATOLOGY | Facility: CLINIC | Age: 64
End: 2024-12-17
Payer: COMMERCIAL

## 2024-12-17 VITALS
SYSTOLIC BLOOD PRESSURE: 134 MMHG | TEMPERATURE: 97.7 F | OXYGEN SATURATION: 96 % | WEIGHT: 185.2 LBS | RESPIRATION RATE: 16 BRPM | BODY MASS INDEX: 30.86 KG/M2 | HEIGHT: 65 IN | DIASTOLIC BLOOD PRESSURE: 86 MMHG | HEART RATE: 94 BPM

## 2024-12-17 DIAGNOSIS — Z79.899 HIGH RISK MEDICATION USE: ICD-10-CM

## 2024-12-17 DIAGNOSIS — M31.6 GIANT CELL ARTERITIS (MULTI): ICD-10-CM

## 2024-12-17 DIAGNOSIS — Z98.62 S/P PERIPHERAL ARTERY ANGIOPLASTY: Primary | ICD-10-CM

## 2024-12-17 PROCEDURE — 3074F SYST BP LT 130 MM HG: CPT | Performed by: INTERNAL MEDICINE

## 2024-12-17 PROCEDURE — 1036F TOBACCO NON-USER: CPT | Performed by: INTERNAL MEDICINE

## 2024-12-17 PROCEDURE — 99214 OFFICE O/P EST MOD 30 MIN: CPT | Performed by: INTERNAL MEDICINE

## 2024-12-17 PROCEDURE — 3050F LDL-C >= 130 MG/DL: CPT | Performed by: INTERNAL MEDICINE

## 2024-12-17 PROCEDURE — 36415 COLL VENOUS BLD VENIPUNCTURE: CPT

## 2024-12-17 PROCEDURE — 4010F ACE/ARB THERAPY RXD/TAKEN: CPT | Performed by: INTERNAL MEDICINE

## 2024-12-17 PROCEDURE — 3078F DIAST BP <80 MM HG: CPT | Performed by: INTERNAL MEDICINE

## 2024-12-17 PROCEDURE — 80053 COMPREHEN METABOLIC PANEL: CPT

## 2024-12-17 PROCEDURE — 3044F HG A1C LEVEL LT 7.0%: CPT | Performed by: INTERNAL MEDICINE

## 2024-12-17 PROCEDURE — 3008F BODY MASS INDEX DOCD: CPT | Performed by: INTERNAL MEDICINE

## 2024-12-17 PROCEDURE — 85025 COMPLETE CBC W/AUTO DIFF WBC: CPT

## 2024-12-17 ASSESSMENT — PAIN SCALES - GENERAL: PAINLEVEL_OUTOF10: 4

## 2024-12-17 NOTE — PROGRESS NOTES
Subjective   Patient ID: Kimberley Murillo is a 64 y.o. female who presents for follow up regarding GCA and large vessel vasculitis.    HPI 64-year-old female here for follow-up regarding GCA and large vessel vasculitis.     The patient developed more joint pain following surgery for left hip labral tear 9/22..  She complained of some pain in her shoulders, neck, also fingers and toes..  Her shoulders, neck and hips hurt first thing in the morning.  She was stiff for 30 to 45 minutes in the morning.  Initial labs 1/24 were remarkable for CRP 6.11 (normal less than 1) and ESR 39.     The patient also complains of significant fatigue which started summer 2023.     In December 2023,  she started having difficulty with her right hand going numb and her fingers turning white when she is doing activities with her hands overhead, such as washing her hair.  She also notes that her arms got fatigued very quickly in this position, and she developed pain in her right proximal arm and right forearm.  She was having difficulty since December 2023 doing activities such as carrying a pot of spaghetti, as she notes that her right arm started hurting in her arms fatigue easily.  Her  thinks that she has lost muscle mass in her proximal arms.  She notes that her fingers cramp if she is trying to use her right hand.  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     The patient has a history of migraine headaches for several years.  She was to get a migraine every 2 weeks for several years.  Over the last several months, she gets 1-2 migraines per week.  She describes her migraines as starting in her neck, radiate over the entire head.  The headaches are associated with photophobia.  She gets nausea but no vomiting.  She uses Excedrin Migraine for the headaches.     Dr. Tesfaye saw the patient January 25, 2024 for evaluation of upper extremity arterial insufficiency.  Physical  exam noted by Dr. Tesfaye 1/25/24   included nonpalpable right brachial, right radial, and right ulnar pulses.  She was noted to have a weak left brachial and radial pulse.  She had palpable femoral and popliteal pulses that seemed normal.     CTA of chest 1/27/24  shows multifocal disease within the right subclavian and axillary artery with diffuse fusiform narrowing of the distal subclavian artery with proximal occlusion of the axillary artery.  The appearance of the multifocal narrowing and vessel thickening raises the question of a nonatherosclerotic vasculitis/arteritis.  She also has mild to moderate left subclavian and axillary artery disease with smooth fusiform narrowing of the distal subclavian and proximal axillary artery.    She had bilateral temporal artery biopsies done February 5, 2024 with concerns for giant cell arteritis.  The biopsies did not show evidence of vasculitis.    Prednisone 30 mg twice daily was started 2/02/24.  Blood sugar started running as high as 500-600 after starting prednisone.  She required temporary insulin which has been stopped.    She is now on metformin  mg with dinner.  Patient also got a Dexcom device.  She was able to stop insulin.  Our pharmacist has been assisting with glucose management.  Blood sugar is now in range over 80% of time.  HbA1C Dec 2024 is 5.8.    She started Actemra 162 mg subcutaneous weekly 4/24.  Prednisone weaned off prednisone 8/12/24.    She had 2 ER visits with complaints of headaches and elevated blood pressure.  The first ER visit was June 4, 2024.  Blood pressure in the ER was 229/85, which was taken with a thigh cuff on her leg.  They were unable to obtain upper extremity blood pressures.  She had a second ER visit June 5, 2024, again with headaches and elevated blood pressure.  Current antihypertensives are amlodipine 10 mg daily, losartan 100 mg daily, and chlorthalidone 25 mg daily.    Because of persistent claudication symptoms and  her right upper extremity, she had procedure December 9, 2024 by Dr. Campo.  Procedure was right upper extremity angiogram with intravascular ultrasound guided atherectomy and balloon angioplasty.  She notes that she had immediate relief of her right upper extremity symptoms.  Numbness that she was having in her hand resolved, and her skin color became more pink.    She was advised to be on DAPT for at least 6 months.  She also started Zetia 10 mg daily.    Plans are to do left subclavian angiogram with intravascular ultrasound-guided intervention December 23, because she does have some persistent claudication symptoms in the left arm as well.    Pathology report from the first procedure is still pending.    She still gets episodic headaches, but is known to have migraines.  She continues to deny jaw claudication.  She feels as though her vision intermittently worse but this has been a compliant for the last year.  Last eye exam showed no major issue.    She tried alendronate for prevention of glucocorticoid induced osteoporosis, but needed to stop it due to dyspepsia.    Labs 7/22: RF 37, ESR 21  Labs 11/22: JASSI negative, JASSI panel negative, ESR 13, CRP 1.7 (normal less than 1), CCP negative,   Labs July 2024: CMP normal except glucose 106 and calcium 10.7, CBC normal, ESR 3, CRP 0.21 (less than 1)  Labs August 2024: ESR less than 1, CRP less than 0.1 (less than 1), CBC normal, CMP normal except glucose 132, cholesterol 259, HDL 60, , triglycerides 196  Labs 9/24: BMP normal except glucose 139, ESR less than 1, CRP 0.18 (normal less than 1)  Labs November 2024: ESR less than 1, high-sensitivity CRP 0.18  Labs December 2024: Hemoglobin A1c 5.8     CXR 12/22: Heart is not enlarged, lungs are clear.  Multilevel degenerative changes noted of the thoracic spine.     Cardiac calcium score August 2020: 0    DEXA April 2024: T-score -2.2 left femoral neck, T-score -1.5 left total hip, T-score normal lumbar  "spine.  Estimated 10-year fracture risk by FRAX is 3.1% for hip fracture and 17% for major osteoporotic fracture.     Medical problem list:  Type 2 diabetes-currently managed with metformin 500 mg twice daily . Hemoglobin A1c was 7.8 December 2023.  Hypothyroidism  Hypertension  Giant cell arteritis with large vessel vasculitis     Medication: Reviewed as documented  Allergies: Reviewed as documented     Surgeries:   Right breast biopsy  Cholecystectomy   Tonsillectomy  Thyroidectomy- for goiter  Lithotripsy  Left hip labral tear repair  Bilateral temporal artery biopsies 2/24 (negative)     Social history: .  Denies use of tobacco. Denies use of alcohol.  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     Family history:  Father- passed away from CAD, RA  Mother- diabetes, CAD  Paternal grandmother- RA    ROS:  General: Denies fevers or chills.  CV: Denies chest pain or palpitations.  Denies leg edema.  Lungs: Denies coughing or shortness of breath.  Skin: Denies rashes or nodules.  MS: Still gets some arm claudication, especially right arm.  She still gets color changes in right hand, especially with holding her arm overhead..  She notes easy arm fatigue when doing activities.    Objective   Visit Vitals  /69 (BP Location: Left arm, Patient Position: Sitting, BP Cuff Size: Adult)   Pulse 94   Temp 36.5 °C (97.7 °F) (Skin)   Resp 16   Ht 1.651 m (5' 5\")   Wt 84 kg (185 lb 3.2 oz)   SpO2 96%   BMI 30.82 kg/m²   OB Status Postmenopausal   Smoking Status Never   BSA 1.96 m²     I could not manually hear blood pressure in either arm.  Thigh blood pressure is 148/90.    Physical Exam  HEENT: PERRL, EOMI. S/p bilateral temporary biopsies.  Neck: Supple, no nodes.  CV: RRR, no MGR. no bruits heard over neck or upper chest.  Lungs: Clear, no rales or wheezes.  Abdomen: Soft, nontender. No hepatosplenomegaly.  Extremities:  No cyanosis, clubbing, or edema.  MS: No " synovitis.  Skin: No rashes or nodules.  Pulses: Faint left radial pulse noted, could not palpate right radial pulse.     Hands are warm with good capillary refill.    Assessment/Plan   Problem List Items Addressed This Visit             ICD-10-CM    Giant cell arteritis (Multi) M31.6    Relevant Orders    Comprehensive Metabolic Panel    CBC and Auto Differential    S/P peripheral artery angioplasty - Primary Z98.62     Other Visit Diagnoses         Codes    High risk medication use     Z79.899    Relevant Orders    Comprehensive Metabolic Panel    CBC and Auto Differential              GCA-has large vessel involvement without cranial arteritis (had negative bilateral temporary biopsies).  Presented with complaints of joint aching involving shoulders, neck, and  hips (less so hands and feet) since 9/22. (Likely PMR)  Complained of significant fatigue with 30 to 45 minutes of morning stiffness since summer 2023.  Also had right arm claudication since 12/23.  Initial labs remarkable for ESR of 39 and CRP 6.11 (normal less than 1).  Inflammatory markers are currently normal.    Claudication symptoms recurred when prednisone was dropped to 5 mg daily.  Right arm claudication resolved with procedure 12/09/24, which consisted of right upper extremity angiogram with IVUS guided atherectomy and DCB,  Plan is to do left upper extremity angiogram with intervention December 23, 2020 2024.  She was advised to remain on DAPT for at least 6 months.  Zetia was added.  Pathology report from first procedure is still pending.    Diabetes-hemoglobin A1c is now 5.8 (off prednisone since 8/12/24).    Osteopenia- unfortunately she is getting-abdominal pain after taking the alendronate (has happened after 3 doses).  I advised her to stop this for now.    Hyperlipidemia-she has a history of statin intolerance.  She tried 2 different statins previously which caused myalgias.  She started Zetia 10 mg daily.  Repatha is being  considered.    Plan:  Check labs: CBC with diff, CMP.  Follow up in 4-6 weeks.

## 2024-12-18 LAB
ALBUMIN SERPL BCP-MCNC: 4.6 G/DL (ref 3.4–5)
ALP SERPL-CCNC: 68 U/L (ref 33–136)
ALT SERPL W P-5'-P-CCNC: 27 U/L (ref 7–45)
ANION GAP SERPL CALC-SCNC: 12 MMOL/L (ref 10–20)
AST SERPL W P-5'-P-CCNC: 22 U/L (ref 9–39)
BASOPHILS # BLD AUTO: 0.1 X10*3/UL (ref 0–0.1)
BASOPHILS NFR BLD AUTO: 1 %
BILIRUB SERPL-MCNC: 0.7 MG/DL (ref 0–1.2)
BUN SERPL-MCNC: 16 MG/DL (ref 6–23)
CALCIUM SERPL-MCNC: 10.8 MG/DL (ref 8.6–10.6)
CHLORIDE SERPL-SCNC: 101 MMOL/L (ref 98–107)
CO2 SERPL-SCNC: 29 MMOL/L (ref 21–32)
CREAT SERPL-MCNC: 1.07 MG/DL (ref 0.5–1.05)
EGFRCR SERPLBLD CKD-EPI 2021: 59 ML/MIN/1.73M*2
EOSINOPHIL # BLD AUTO: 0.15 X10*3/UL (ref 0–0.7)
EOSINOPHIL NFR BLD AUTO: 1.5 %
ERYTHROCYTE [DISTWIDTH] IN BLOOD BY AUTOMATED COUNT: 12.1 % (ref 11.5–14.5)
GLUCOSE SERPL-MCNC: 129 MG/DL (ref 74–99)
HCT VFR BLD AUTO: 35.9 % (ref 36–46)
HGB BLD-MCNC: 12.4 G/DL (ref 12–16)
IMM GRANULOCYTES # BLD AUTO: 0.02 X10*3/UL (ref 0–0.7)
IMM GRANULOCYTES NFR BLD AUTO: 0.2 % (ref 0–0.9)
LYMPHOCYTES # BLD AUTO: 1.84 X10*3/UL (ref 1.2–4.8)
LYMPHOCYTES NFR BLD AUTO: 18.3 %
MCH RBC QN AUTO: 29.7 PG (ref 26–34)
MCHC RBC AUTO-ENTMCNC: 34.5 G/DL (ref 32–36)
MCV RBC AUTO: 86 FL (ref 80–100)
MONOCYTES # BLD AUTO: 0.89 X10*3/UL (ref 0.1–1)
MONOCYTES NFR BLD AUTO: 8.9 %
NEUTROPHILS # BLD AUTO: 7.04 X10*3/UL (ref 1.2–7.7)
NEUTROPHILS NFR BLD AUTO: 70.1 %
NRBC BLD-RTO: 0 /100 WBCS (ref 0–0)
PLATELET # BLD AUTO: 264 X10*3/UL (ref 150–450)
POTASSIUM SERPL-SCNC: 3.9 MMOL/L (ref 3.5–5.3)
PROT SERPL-MCNC: 6.7 G/DL (ref 6.4–8.2)
RBC # BLD AUTO: 4.18 X10*6/UL (ref 4–5.2)
SODIUM SERPL-SCNC: 138 MMOL/L (ref 136–145)
WBC # BLD AUTO: 10 X10*3/UL (ref 4.4–11.3)

## 2024-12-23 ENCOUNTER — APPOINTMENT (OUTPATIENT)
Dept: RHEUMATOLOGY | Facility: CLINIC | Age: 64
End: 2024-12-23
Payer: COMMERCIAL

## 2024-12-23 ENCOUNTER — HOSPITAL ENCOUNTER (OUTPATIENT)
Facility: HOSPITAL | Age: 64
Setting detail: OUTPATIENT SURGERY
Discharge: HOME | End: 2024-12-23
Attending: INTERNAL MEDICINE | Admitting: INTERNAL MEDICINE
Payer: COMMERCIAL

## 2024-12-23 VITALS
SYSTOLIC BLOOD PRESSURE: 143 MMHG | OXYGEN SATURATION: 100 % | DIASTOLIC BLOOD PRESSURE: 77 MMHG | BODY MASS INDEX: 30.89 KG/M2 | HEIGHT: 65 IN | RESPIRATION RATE: 16 BRPM | WEIGHT: 185.41 LBS | TEMPERATURE: 96.8 F | HEART RATE: 86 BPM

## 2024-12-23 DIAGNOSIS — Z98.62 S/P PERIPHERAL ARTERY ANGIOPLASTY: ICD-10-CM

## 2024-12-23 DIAGNOSIS — I73.9 PERIPHERAL VASCULAR DISEASE, UNSPECIFIED (CMS-HCC): Primary | ICD-10-CM

## 2024-12-23 DIAGNOSIS — I70.218 ATHEROSCLEROSIS OF NATIVE ARTERIES OF EXTREMITIES WITH INTERMITTENT CLAUDICATION, OTHER EXTREMITY (CMS-HCC): ICD-10-CM

## 2024-12-23 LAB — ACT BLD: 242 SEC (ref 83–199)

## 2024-12-23 PROCEDURE — 37246 TRLUML BALO ANGIOP 1ST ART: CPT | Mod: LT | Performed by: INTERNAL MEDICINE

## 2024-12-23 PROCEDURE — 2720000007 HC OR 272 NO HCPCS: Performed by: INTERNAL MEDICINE

## 2024-12-23 PROCEDURE — 2500000005 HC RX 250 GENERAL PHARMACY W/O HCPCS: Performed by: INTERNAL MEDICINE

## 2024-12-23 PROCEDURE — 37246 TRLUML BALO ANGIOP 1ST ART: CPT | Performed by: INTERNAL MEDICINE

## 2024-12-23 PROCEDURE — C2623 CATH, TRANSLUMIN, DRUG-COAT: HCPCS | Performed by: INTERNAL MEDICINE

## 2024-12-23 PROCEDURE — C1769 GUIDE WIRE: HCPCS | Performed by: INTERNAL MEDICINE

## 2024-12-23 PROCEDURE — 96373 THER/PROPH/DIAG INJ IA: CPT | Performed by: INTERNAL MEDICINE

## 2024-12-23 PROCEDURE — 2550000001 HC RX 255 CONTRASTS: Performed by: INTERNAL MEDICINE

## 2024-12-23 PROCEDURE — C1725 CATH, TRANSLUMIN NON-LASER: HCPCS | Performed by: INTERNAL MEDICINE

## 2024-12-23 PROCEDURE — 7100000009 HC PHASE TWO TIME - INITIAL BASE CHARGE: Performed by: INTERNAL MEDICINE

## 2024-12-23 PROCEDURE — 99153 MOD SED SAME PHYS/QHP EA: CPT | Performed by: INTERNAL MEDICINE

## 2024-12-23 PROCEDURE — 2700000047 HC OR 270 NO HCPCS: Performed by: INTERNAL MEDICINE

## 2024-12-23 PROCEDURE — C1887 CATHETER, GUIDING: HCPCS | Performed by: INTERNAL MEDICINE

## 2024-12-23 PROCEDURE — C1753 CATH, INTRAVAS ULTRASOUND: HCPCS | Performed by: INTERNAL MEDICINE

## 2024-12-23 PROCEDURE — C1894 INTRO/SHEATH, NON-LASER: HCPCS | Performed by: INTERNAL MEDICINE

## 2024-12-23 PROCEDURE — 99152 MOD SED SAME PHYS/QHP 5/>YRS: CPT | Performed by: INTERNAL MEDICINE

## 2024-12-23 PROCEDURE — 85347 COAGULATION TIME ACTIVATED: CPT

## 2024-12-23 PROCEDURE — 2500000001 HC RX 250 WO HCPCS SELF ADMINISTERED DRUGS (ALT 637 FOR MEDICARE OP): Performed by: NURSE PRACTITIONER

## 2024-12-23 PROCEDURE — 37252 INTRVASC US NONCORONARY 1ST: CPT | Performed by: INTERNAL MEDICINE

## 2024-12-23 PROCEDURE — C1760 CLOSURE DEV, VASC: HCPCS | Performed by: INTERNAL MEDICINE

## 2024-12-23 PROCEDURE — 2500000004 HC RX 250 GENERAL PHARMACY W/ HCPCS (ALT 636 FOR OP/ED): Performed by: INTERNAL MEDICINE

## 2024-12-23 PROCEDURE — 37252 INTRVASC US NONCORONARY 1ST: CPT | Mod: LT | Performed by: INTERNAL MEDICINE

## 2024-12-23 PROCEDURE — 7100000010 HC PHASE TWO TIME - EACH INCREMENTAL 1 MINUTE: Performed by: INTERNAL MEDICINE

## 2024-12-23 RX ORDER — MIDAZOLAM HYDROCHLORIDE 5 MG/ML
INJECTION, SOLUTION INTRAMUSCULAR; INTRAVENOUS AS NEEDED
Status: DISCONTINUED | OUTPATIENT
Start: 2024-12-23 | End: 2024-12-23 | Stop reason: HOSPADM

## 2024-12-23 RX ORDER — IODIXANOL 320 MG/ML
INJECTION, SOLUTION INTRAVASCULAR AS NEEDED
Status: DISCONTINUED | OUTPATIENT
Start: 2024-12-23 | End: 2024-12-23 | Stop reason: HOSPADM

## 2024-12-23 RX ORDER — CLOPIDOGREL BISULFATE 75 MG/1
75 TABLET ORAL DAILY
Status: DISCONTINUED | OUTPATIENT
Start: 2024-12-23 | End: 2024-12-23 | Stop reason: HOSPADM

## 2024-12-23 RX ORDER — ASPIRIN 325 MG
325 TABLET ORAL ONCE
Status: DISCONTINUED | OUTPATIENT
Start: 2024-12-23 | End: 2024-12-23 | Stop reason: HOSPADM

## 2024-12-23 RX ORDER — LIDOCAINE HYDROCHLORIDE 10 MG/ML
10 INJECTION, SOLUTION EPIDURAL; INFILTRATION; INTRACAUDAL; PERINEURAL ONCE
Status: DISCONTINUED | OUTPATIENT
Start: 2024-12-23 | End: 2024-12-23 | Stop reason: HOSPADM

## 2024-12-23 RX ORDER — FENTANYL CITRATE 50 UG/ML
INJECTION, SOLUTION INTRAMUSCULAR; INTRAVENOUS AS NEEDED
Status: DISCONTINUED | OUTPATIENT
Start: 2024-12-23 | End: 2024-12-23 | Stop reason: HOSPADM

## 2024-12-23 RX ORDER — ACETAMINOPHEN 325 MG/1
650 TABLET ORAL EVERY 6 HOURS PRN
Status: DISCONTINUED | OUTPATIENT
Start: 2024-12-23 | End: 2024-12-23 | Stop reason: HOSPADM

## 2024-12-23 RX ORDER — CLOPIDOGREL BISULFATE 300 MG/1
300 TABLET, FILM COATED ORAL ONCE
Status: DISCONTINUED | OUTPATIENT
Start: 2024-12-23 | End: 2024-12-23

## 2024-12-23 RX ORDER — DIPHENHYDRAMINE HYDROCHLORIDE 50 MG/ML
INJECTION INTRAMUSCULAR; INTRAVENOUS AS NEEDED
Status: DISCONTINUED | OUTPATIENT
Start: 2024-12-23 | End: 2024-12-23 | Stop reason: HOSPADM

## 2024-12-23 RX ORDER — LIDOCAINE HYDROCHLORIDE 20 MG/ML
INJECTION, SOLUTION INFILTRATION; PERINEURAL AS NEEDED
Status: DISCONTINUED | OUTPATIENT
Start: 2024-12-23 | End: 2024-12-23 | Stop reason: HOSPADM

## 2024-12-23 RX ORDER — NITROGLYCERIN 40 MG/100ML
INJECTION INTRAVENOUS AS NEEDED
Status: DISCONTINUED | OUTPATIENT
Start: 2024-12-23 | End: 2024-12-23 | Stop reason: HOSPADM

## 2024-12-23 RX ORDER — HEPARIN SODIUM 1000 [USP'U]/ML
INJECTION, SOLUTION INTRAVENOUS; SUBCUTANEOUS AS NEEDED
Status: DISCONTINUED | OUTPATIENT
Start: 2024-12-23 | End: 2024-12-23 | Stop reason: HOSPADM

## 2024-12-23 RX ADMIN — ACETAMINOPHEN 650 MG: 325 TABLET, FILM COATED ORAL at 12:35

## 2024-12-23 RX ADMIN — CLOPIDOGREL BISULFATE 75 MG: 75 TABLET ORAL at 08:10

## 2024-12-23 ASSESSMENT — PAIN - FUNCTIONAL ASSESSMENT
PAIN_FUNCTIONAL_ASSESSMENT: 0-10

## 2024-12-23 ASSESSMENT — PAIN SCALES - GENERAL
PAINLEVEL_OUTOF10: 0 - NO PAIN
PAINLEVEL_OUTOF10: 3
PAINLEVEL_OUTOF10: 0 - NO PAIN

## 2024-12-23 ASSESSMENT — PAIN DESCRIPTION - LOCATION: LOCATION: OTHER (COMMENT)

## 2024-12-23 NOTE — NURSING NOTE
Discharge instructions reviewed with patient and family, both verbalized understanding of instructions. IV access removed. Site stable with no bleeding or hematoma observed. Belongings returned. No complaint of nausea or pain. Patient taken to lobby in wheelchair.

## 2024-12-23 NOTE — Clinical Note
Vessel(s): left axillary artery and left subclavian artery. Injected with hand injections. Single view taken.

## 2024-12-23 NOTE — Clinical Note
Patient Clipped and Prepped: left groin and left radial. Prepped with ChloraPrep, a minimum of 3 minute dry time, longer if needed, no pooling noted, patient draped in sterile fashion.

## 2024-12-23 NOTE — DISCHARGE INSTRUCTIONS
PERIPHERAL ANGIOGRAPHY DISCHARGE INSTRUCTIONS    FOR SUDDEN AND SEVERE CHEST PAIN, SHORTNESS OF BREATH, EXCESSIVE BLEEDING, SIGNS OF STROKE, OR CHANGES IN MENTAL STATUS YOU SHOULD CALL 911 IMMEDIATELY.     If your provider has prescribed aspirin and/or clopidogrel (Plavix), or prasugrel (Effient), or ticagrelor (Brilinta), DO NOT STOP THESE MEDICATIONS for any reason without talking to your cardiologist first. If any of these were prescribed, you must take them every day without missing a single dose. If you are getting low on these medications, contact your provider immediately for a refill.     FOR NEXT 24 HOURS  - Upon discharge, you should return home and rest for the remainder of the day and evening. You do not have to stay on bed rest but should not be very active.  It is recommended a responsible adult be with you for the first 24 hours after the procedure.    - No driving for 24 hours after procedure. Please arrange for someone to drive you home from the hospital today.     - Do not drive, operate machinery, or use power tools for 24 hours after your procedure.     - Do not make any legal decisions for 24 hours after your procedure.     - Do not drink alcoholic beverages for 24 hours after your procedure.    WOUND CARE   *FOR RADIAL (WRIST) ACCESS*  ·      No lifting anything with affected arm, no bending or flexing affected wrist for 24 hours - for example, treat your wrist as if it is sprained.        ·      No lifting greater that 5 pounds with your affected arm for 5 days.  ·      Do not engage in vigorous activities (tennis, golf, bowling, weights) for at least 5 days after the procedure.  ·      Do not submerge the wrist in water for 7 days after the procedure.  ·      You should expect mild tingling in your hand and tenderness at the puncture site for up to 3 days.    - The transparent dressing should be removed from the site 24 hours after the procedure.  Wash the site gently with soap and  water. Rinse well and pat dry. Keep the area clean and dry. You may apply a Band-Aid to the site. Avoid lotions, ointments, or powders until fully healed.     - You may shower the day after your procedure.      - It is normal to notice a small bruise around the puncture site and/or a small grape sized or smaller lump. Any large bruising or large lump warrants a call to the office.     - If bleeding should occur, lay down and apply pressure to the affected area for 10 minutes.  If the bleeding stops notify your physician.  If there is a large amount of bleeding or spurting of blood CALL 911 immediately.  DO NOT drive yourself to the hospital.    - You may experience some tenderness, bruising or minimal inflammation.  If you have any concerns, you may contact the Cath Lab or if any of these symptoms become excessive, contact your cardiologist or go to the emergency room.     OTHER INSTRUCTIONS  - You may take acetaminophen (Tylenol) as directed for discomfort.  If pain is not relieved with acetaminophen (Tylenol), contact your doctor.    - It can be normal to have slight swelling in the leg the procedure was performed on (not the puncture site leg) post-procedure. Elevate the leg as much as possible above the level of your heart. Any excessive swelling, warmth or redness to the leg warrants a call to the office.    - If you notice or experience any of the following, you should notify your doctor or seek medical attention  Chest pain or discomfort  Change in mental status or weakness in extremities.  Dizziness, light headedness, or feeling faint.  Change in the site where the procedure was performed, such as bleeding or an increased area of bruising or swelling.  Tingling, numbness, pain, or coolness in the leg/arm beyond the site where the procedure was performed.  Signs of infection (i.e. shaking chills, temperature > 100 degrees Fahrenheit, warmth, redness) in the leg/arm area where the procedure was  performed.  Changes in urination   Bloody or black stools  Vomiting blood  Severe nose bleeds    - Please have ARIAN testing completed and follow up with Cristiana at Dr. Campo's office in 3 months as scheduled, [phone].    - Please hold Metformin for 72 hours, resume 12/26/24.

## 2024-12-23 NOTE — H&P
History and Physical   Pre Surgical Review (< 30 days)      History & Physical Reviewed    I have reviewed the History and Physical dated:  12/17/24   History and Physical reviewed and relevant findings noted. Patient examined to review pertinent physical  findings.: No significant changes   Home Medications Reviewed: see medication list below   Allergies Reviewed: no changes noted      Home Medications  Current Outpatient Medications   Medication Instructions    Actemra ACTPen 162 mg, subcutaneous, Every 7 days    amLODIPine (NORVASC) 10 mg, oral, Daily    aspirin 81 mg, oral, Daily    blood-glucose sensor (FreeStyle Chava 3 Sensor) device APPLY 1 SENSOR TO THE BACK OF THE ARM FOR CONTINUOUS BLOOD SUGAR MONITORING. REMOVE SENSOR AND REPLACE WITH NEW SENSOR EVERY 14 DAYS.    chlorthalidone (HYGROTON) 25 mg, Daily RT    clopidogrel (PLAVIX) 75 mg, oral, Daily    ezetimibe (ZETIA) 10 mg, oral, Daily    losartan (Cozaar) 100 mg tablet 1 tablet, Daily (0630)    metFORMIN XR (GLUCOPHAGE-XR) 500 mg, oral, 2 times daily (morning and late afternoon)    Unithroid 112 mcg, oral, Daily, Take on an empty stomach at the same time each day, either 30 to 60 minutes prior to breakfast        Allergies  Allergies   Allergen Reactions    Meloxicam Rash and Shortness of breath    Salsalate Rash and Shortness of breath    Glimepiride Other     Patient noted swelling around her lips when taking     Hydralazine Swelling    Labetalol Headache and Nausea/vomiting    Lisinopril Cough    Statins-Hmg-Coa Reductase Inhibitors GI bleeding     Body ache  Bleeding rectally     Adhesive Rash    Adhesive Tape-Silicones Rash     Includes bandaids    Latex Rash       Physical Exam  Physical Exam  Constitutional:       General: She is not in acute distress.  Cardiovascular:      Rate and Rhythm: Normal rate and regular rhythm.      Comments: + pulses all extremities.  Pulmonary:      Effort: Pulmonary effort is normal. No respiratory  distress.      Comments: Clear bilaterally.  Abdominal:      General: Bowel sounds are normal.   Skin:     Comments: Right groin ecchymotic/soft with palpation.   Neurological:      General: No focal deficit present.      Mental Status: She is alert and oriented to person, place, and time.   Psychiatric:         Mood and Affect: Mood normal.         Behavior: Behavior normal.          Airway/Sedation Assessment     Assessment by anesthesia N/A     ·  Mouth Opening OK yes      Neck Flexibility OK yes      Loose Teeth No   ·  Oropharyngeal Classification Grade III   ·  ASA PS Classification ASA III - Patient with severe systemic disease       Sedation Plan Moderate     Risks, benefits, and alternatives discussed with patient.     ERAS (Enhanced Recovery After Surgery):  ·  ERAS Patient: No      Consent:   COVID-19 Consent:  ·  COVID-19 Risk Consent Surgeon has reviewed key risks related to the risk of andry COVID-19 and if they contract COVID-19 what the risks are.     Tresa Ku, APRN-CNP

## 2024-12-23 NOTE — Clinical Note
Vessel(s): left brachial artery and left radial artery. Injected with hand injections. Single view taken.

## 2024-12-23 NOTE — POST-PROCEDURE NOTE
Physician Transition of Care Summary  Invasive Cardiovascular Lab    Procedure Date: 12/23/2024  Attending:    Emile Campo - Primary  Resident/Fellow/Other Assistant: Surgeons and Role:  * No surgeons found with a matching role *    Indications:   Pre-op Diagnosis      * Peripheral vascular disease, unspecified (CMS-[facility]) [I73.9]    Post-procedure diagnosis:   Post-op Diagnosis     * Peripheral vascular disease, unspecified (CMS-[facility]) [I73.9]    Procedure(s):   Upper Extremity Intervention  41241 - TX TRLML BALO ANGIOP OPEN/PERQ IMG S&I 1ST ART        Procedure Findings:   Severe disease of Left Axillary artery S/p Successful PTA/DCB of L axillary artery    Access/Closure:  Left radial artery/TR band      Complications:   None    Stents/Implants:   Implants       No implant documentation for this case.            Anticoagulation/Antiplatelet Plan:   Aspirin/Plavix    Estimated Blood Loss:   10 mL    Anesthesia: Moderate Sedation Anesthesia Staff: No anesthesia staff entered.    Any Specimen(s) Removed:   No specimens collected during this procedure.    Disposition:   Home today.      Electronically signed by: Silvia Gavin MD, 12/23/2024 9:08 AM

## 2024-12-24 ENCOUNTER — TELEPHONE (OUTPATIENT)
Dept: CARDIOLOGY | Facility: CLINIC | Age: 64
End: 2024-12-24
Payer: COMMERCIAL

## 2024-12-24 NOTE — TELEPHONE ENCOUNTER
"Pt contacted cath lab with concerns of hand swelling, she recently had procedure with Dr Campo and the left wrist was accessed. She denies pain, describes left hand as \"sausage fingers\", she is able to move her hand and fingers. She still has a pressure dressing on the site. Dr Campo requested a picture, picture sent to Dr Campo. Doctor reviewed the pic and thinks she should remove all dressings and elevate.    Pt notified. Pt believes that the swelling is a result of the dressing. Pt removed dressing.   "

## 2024-12-26 DIAGNOSIS — E11.9 TYPE 2 DIABETES MELLITUS WITHOUT COMPLICATION, WITHOUT LONG-TERM CURRENT USE OF INSULIN (MULTI): ICD-10-CM

## 2024-12-26 RX ORDER — BLOOD-GLUCOSE SENSOR
EACH MISCELLANEOUS
Qty: 2 EACH | Refills: 0 | Status: SHIPPED | OUTPATIENT
Start: 2024-12-26

## 2024-12-26 NOTE — TELEPHONE ENCOUNTER
Patient is requesting a refill on blood-glucose sensor (FreeStyle Chava 3 Sensor) device     Last office visit: 12/17/2024

## 2024-12-26 NOTE — TREATMENT PLAN
Patient reported edema on day after procedure - I have called patient, this edema improved after removal of immobilization brace.    No change in plans.

## 2024-12-27 ENCOUNTER — HOSPITAL ENCOUNTER (OUTPATIENT)
Dept: RADIOLOGY | Facility: HOSPITAL | Age: 64
Discharge: HOME | End: 2024-12-27
Payer: COMMERCIAL

## 2024-12-27 ENCOUNTER — OFFICE VISIT (OUTPATIENT)
Dept: SURGICAL ONCOLOGY | Facility: HOSPITAL | Age: 64
End: 2024-12-27
Payer: COMMERCIAL

## 2024-12-27 VITALS
HEART RATE: 73 BPM | BODY MASS INDEX: 31.12 KG/M2 | DIASTOLIC BLOOD PRESSURE: 72 MMHG | WEIGHT: 187 LBS | SYSTOLIC BLOOD PRESSURE: 130 MMHG | TEMPERATURE: 97 F

## 2024-12-27 DIAGNOSIS — R92.8 ABNORMAL FINDING ON BREAST IMAGING: ICD-10-CM

## 2024-12-27 PROCEDURE — 3075F SYST BP GE 130 - 139MM HG: CPT

## 2024-12-27 PROCEDURE — 1036F TOBACCO NON-USER: CPT

## 2024-12-27 PROCEDURE — 77066 DX MAMMO INCL CAD BI: CPT | Performed by: RADIOLOGY

## 2024-12-27 PROCEDURE — 99214 OFFICE O/P EST MOD 30 MIN: CPT

## 2024-12-27 PROCEDURE — 77062 BREAST TOMOSYNTHESIS BI: CPT

## 2024-12-27 PROCEDURE — 76642 ULTRASOUND BREAST LIMITED: CPT | Mod: RT

## 2024-12-27 PROCEDURE — 4010F ACE/ARB THERAPY RXD/TAKEN: CPT

## 2024-12-27 PROCEDURE — 76642 ULTRASOUND BREAST LIMITED: CPT | Performed by: RADIOLOGY

## 2024-12-27 PROCEDURE — 3078F DIAST BP <80 MM HG: CPT

## 2024-12-27 PROCEDURE — 76982 USE 1ST TARGET LESION: CPT | Mod: RT

## 2024-12-27 PROCEDURE — 77062 BREAST TOMOSYNTHESIS BI: CPT | Performed by: RADIOLOGY

## 2024-12-27 PROCEDURE — 3044F HG A1C LEVEL LT 7.0%: CPT

## 2024-12-27 PROCEDURE — 3050F LDL-C >= 130 MG/DL: CPT

## 2024-12-27 ASSESSMENT — PAIN SCALES - GENERAL: PAINLEVEL_OUTOF10: 0-NO PAIN

## 2024-12-27 NOTE — PROGRESS NOTES
Kimberley Murillo female   1960 64 y.o.   [id number]      Chief Complaint  Abnormal breast imaging    History Of Present Illness  Kimberley Murillo is a 64 y.o. female followed in the breast center for probably benign right breast findings. She has a history of bilateral benign core biopsies at least five total and excision. She has two paternal aunts with breast cancer, 40's. Today she denies new breast masses or nodules, skin or nipple changes, nipple discharge, or lymphadenopathy bilaterally.       BREAST IMAGIN2024 Right breast diagnostic mammogram with ultrasound, indicates BI-RADS Category 3. Stable biopsy proven benign right breast architectural distortion. Given the history of a remote benign right breast excisional biopsy, this is consistent with a benign postsurgical scar. No additional imaging follow-up for this finding is required. Stable probably benign right breast mass at the 9 o'clock position documenting 18 months of stability. A final six-month follow-up ultrasound coinciding with the patient's next annual bilateral mammogram is recommended to document stability. The right breast mass at 10:00 is now considered benign given the interval decrease in size. No additional imaging follow-up for this finding is required. 2024 Bilateral diagnostic mammogram BI-RADS Category 2. No change in the parenchymal pattern likely related to excisional changes. In addition, no change in the probably benign mass at the 9 o'clock position. Stability of these findings is compatible with a benign process and no further follow-up is needed.    REPRODUCTIVE HISTORY: menarche age 17, , first birth age 30, did not breastfeed, OCP's x 3 years, natural menopause age 60, no HRT, scattered fibroglandular tissue                                   FAMILY CANCER HISTORY:   Paternal Aunt (1): Breast cancer, 40's  Paternal Aunt (2): Breast cancer, 40's     Surgical History  She has a past surgical history that  includes Other surgical history (11/03/2022); Other surgical history (11/03/2022); Other surgical history (11/03/2022); Other surgical history (11/03/2022); Other surgical history (11/03/2022); Other surgical history (11/03/2022); Other surgical history (11/03/2022); BI stereotactic guided breast right localization and biopsy (Right, 12/18/2023); Breast biopsy (Right); Invasive Vascular Procedure (Bilateral, 12/09/2024); Invasive Vascular Procedure (N/A, 12/09/2024); Invasive Vascular Procedure (N/A, 12/09/2024); and Breast cyst excision.     Social History  She reports that she has never smoked. She has never used smokeless tobacco. She reports that she does not drink alcohol and does not use drugs.    Family History  Family History   Problem Relation Name Age of Onset    Arthritis Mother Kelsey Zhong     Asthma Mother Kelsey Zhong     COPD Mother Kelsey Zhong     Diabetes Mother Kelsey Zhong     Hypertension Mother Kelsey Zhong     Kidney disease Mother Kelsey Zhong     Vision loss Mother Kelsey Zhong     Arthritis Father Nima Adilene Sr.     Diabetes Father Nima Adilene Sr.     Heart disease Father Nima Adilene Sr.     Hypertension Father Nima Adilene Sr.     Vision loss Father Nima Adilene Sr.     Birth defects Maternal Grandmother Fathers mother     Breast cancer Father's Sister Both Sisters     Learning disabilities Father's Brother Laith Head         Allergies  Meloxicam, Salsalate, Glimepiride, Hydralazine, Labetalol, Lisinopril, Statins-hmg-coa reductase inhibitors, Adhesive, Adhesive tape-silicones, and Latex    Medications  Current Outpatient Medications   Medication Instructions    Actemra ACTPen 162 mg, subcutaneous, Every 7 days    amLODIPine (NORVASC) 10 mg, oral, Daily    aspirin 81 mg, oral, Daily    chlorthalidone (HYGROTON) 25 mg, Daily RT    clopidogrel (PLAVIX) 75 mg, oral, Daily    ezetimibe (ZETIA) 10 mg, oral, Daily    FreeStyle Chava 3 Sensor device APPLY 1 SENSOR TO THE BACK OF THE ARM FOR  CONTINUOUS BLOOD SUGAR MONITORING. REMOVE SENSOR AND REPLACE WITH NEW SENSOR EVERY 14 DAYS    losartan (Cozaar) 100 mg tablet 1 tablet, Daily (0630)    metFORMIN XR (GLUCOPHAGE-XR) 500 mg, oral, 2 times daily (morning and late afternoon)    Unithroid 112 mcg, oral, Daily, Take on an empty stomach at the same time each day, either 30 to 60 minutes prior to breakfast         REVIEW OF SYSTEMS    Constitutional:  Negative for appetite change, fatigue, fever and unexpected weight change.   HENT:  Negative for ear pain, hearing loss, nosebleeds, sore throat and trouble swallowing.    Eyes:  Negative for discharge, itching and visual disturbance.   Respiratory:  Negative for cough, chest tightness and shortness of breath.    Cardiovascular:  Negative for chest pain, palpitations and leg swelling.   Breast: as indicated in HPI  Gastrointestinal:  Negative for abdominal pain, constipation, diarrhea and nausea.   Endocrine: Negative for cold intolerance and heat intolerance.   Genitourinary:  Negative for dysuria, frequency, hematuria, pelvic pain and vaginal bleeding.   Musculoskeletal:  Negative for arthralgias, back pain, gait problem, joint swelling and myalgias.   Skin:  Negative for color change and rash.   Allergic/Immunologic: Negative for environmental allergies and food allergies.   Neurological:  Negative for dizziness, tremors, speech difficulty, weakness, numbness and headaches.   Hematological:  Does not bruise/bleed easily.   Psychiatric/Behavioral:  Negative for agitation, dysphoric mood and sleep disturbance. The patient is not nervous/anxious.         Past Medical History  She has a past medical history of Delayed emergence from general anesthesia (1987), Fibrocystic breast, Goiter (1987), Headache, unspecified, Hyperparathyroidism (Multi) (1987), Hypertension (2019), Inflammation of arteries (CMS-HCC) (2024), Osteoarthritis (2024), Personal history of diseases of the blood and blood-forming organs and  certain disorders involving the immune mechanism, Personal history of pneumonia (recurrent), PONV (postoperative nausea and vomiting) (1987), Spinal headache (1992), and Thyroid nodule (1987).     Physical Exam  Patient is alert and oriented x3 and in a relaxed and appropriate mood. Her gait is steady and hand grasps are equal. Sclera is clear. The breasts are nearly symmetrical large and pendulous, right larger. The tissue is soft without palpable abnormalities, discrete nodules or masses. The skin and nipples appear normal. There is no cervical, supraclavicular or axillary lymphadenopathy. Heart rate and rhythm normal, S1 and S2 appreciated. The lungs are clear to auscultation bilaterally. Abdomen is soft and non-tender.       Physical Exam     Last Recorded Vitals  Vitals:    12/27/24 0939   BP: 130/72   Pulse: 73   Temp: 36.1 °C (97 °F)       Relevant Results   Time was spent viewing digital images of the radiology testing with the patient. I explained the results in depth, along with suggested explanation for follow up recommendations based on the testing results. BI-RADS Category 2    Imaging  Interpreted By:  [facility] [place],   STUDY:  BI MAMMO BILATERAL DIAGNOSTIC TOMOSYNTHESIS; BI US BREAST LIMITED  RIGHT;  12/27/2024 9:04 am; 12/27/2024 9:30 am      ACCESSION NUMBER(S):  [id number]; [id number]      ORDERING CLINICIAN:  REAGAN SHEFFIELD      INDICATION:  Follow-up for probably benign findings in the right breast. Chronic,  nonfocal pain and itching was also described.      ,R92.8 Other abnormal and inconclusive findings on diagnostic imaging  of breast      COMPARISON:  09/01/2022, 10/10/2022      FINDINGS:  MAMMOGRAPHY: 2D and tomosynthesis images were reviewed at 1 mm slice  thickness.      Density:  There are scattered areas of fibroglandular density.      Patient presents for follow-up of probably benign findings in the  right breast.      Bilateral circumscribed masses are noted again.  Scattered  benign-appearing calcifications are noted. The masses for which  follow-up recommended are better appreciated sonographically. The  distorted appearance in the right breast also remains unchanged.      There are no new suspicious masses, calcifications, or areas of  distortion noted.      ULTRASOUND: Targeted ultrasound was performed of the right breast by  a registered sonographer with elastography.      Sonographic images were obtained of the right breast. At the 9  o'clock position, 9 cm from the nipple the previously described  finding is noted again. It is unchanged. It measures 0.6 x 0.4 x 0.5  cm. This is stable to slightly smaller when compared to the prior  examination. This has been stable for 2 years and is therefore  considered benign.      IMPRESSION:  No change in the parenchymal pattern likely related to excisional  changes. In addition, no change in the probably benign mass at the 9  o'clock position. Stability of these findings is compatible with a  benign process and no further follow-up is needed.      No new mammographic evidence of malignancy bilaterally. Patient can  return to screening.      BI-RADS CATEGORY:  BI-RADS Category:  2 Benign.  Recommendation:  Annual Screening.  Recommended Date:  1 Year.  Laterality:  Bilateral.           Assessment/Plan   stable mammogram- benign right breast mass, right breast benign core biopsy, benign bilateral core biopsies with cyst excisions, family history of breast cancer, scattered fibroglandular tissue       Patient Discussion/Summary  Your clinical examination and imaging are normal. You no longer need to be seen by a breast specialist for an annual physical breast examination. It is important to continue annual screening mammograms and breast exams through your primary care provider. Please return to see me if you have a new breast problem or abnormal mammogram. It has been a pleasure having you as a patient.     You can see your health  information, review clinical summaries from office visits & test results online when you follow your health with MY  Chart, a personal health record. To sign up go to [email or web address]. If you need assistance with signing up or trouble getting into your account call [employer] Patient Line 24/7 at [phone].    My office phone number is [phone] if you need to get in touch with me or have additional questions or concerns. Thank you for choosing [facility] and trusting me as your healthcare provider. I am honored to be a provider on your health care team and I remain dedicated to helping you achieve your health goals.         Vidhi Brown, APRN-CNP

## 2024-12-27 NOTE — PATIENT INSTRUCTIONS
Your clinical examination and imaging are normal. You no longer need to be seen by a breast specialist for an annual physical breast examination. It is important to continue annual screening mammograms and breast exams through your primary care provider. Please return to see me if you have a new breast problem or abnormal mammogram. It has been a pleasure having you as a patient.     You can see your health information, review clinical summaries from office visits & test results online when you follow your health with MY  Chart, a personal health record. To sign up go to [email or web address]. If you need assistance with signing up or trouble getting into your account call [employer] Patient Line 24/7 at [phone].    My office phone number is [phone] if you need to get in touch with me or have additional questions or concerns. Thank you for choosing [facility] and trusting me as your healthcare provider. I am honored to be a provider on your health care team and I remain dedicated to helping you achieve your health goals.

## 2025-01-10 DIAGNOSIS — I73.9 PERIPHERAL VASCULAR DISEASE, UNSPECIFIED (CMS-HCC): ICD-10-CM

## 2025-01-10 DIAGNOSIS — E78.00 HYPERCHOLESTEROLEMIA: ICD-10-CM

## 2025-01-10 RX ORDER — CLOPIDOGREL BISULFATE 75 MG/1
75 TABLET ORAL DAILY
Qty: 30 TABLET | Refills: 11 | Status: SHIPPED | OUTPATIENT
Start: 2025-01-10

## 2025-01-10 RX ORDER — EZETIMIBE 10 MG/1
10 TABLET ORAL DAILY
Qty: 30 TABLET | Refills: 11 | Status: SHIPPED | OUTPATIENT
Start: 2025-01-10

## 2025-01-16 ENCOUNTER — TELEPHONE (OUTPATIENT)
Dept: PRIMARY CARE | Facility: CLINIC | Age: 65
End: 2025-01-16

## 2025-01-16 ENCOUNTER — APPOINTMENT (OUTPATIENT)
Dept: RHEUMATOLOGY | Facility: CLINIC | Age: 65
End: 2025-01-16
Payer: COMMERCIAL

## 2025-01-16 NOTE — TELEPHONE ENCOUNTER
Pt rescheduled appointment today due to weather - reschedule for Feb 10 - wants to know if Dr Elder would want any labs before appt - please advise

## 2025-01-17 DIAGNOSIS — M31.6 GIANT CELL ARTERITIS (MULTI): Primary | ICD-10-CM

## 2025-01-17 DIAGNOSIS — E11.65 TYPE 2 DIABETES MELLITUS WITH HYPERGLYCEMIA, WITHOUT LONG-TERM CURRENT USE OF INSULIN: ICD-10-CM

## 2025-01-17 DIAGNOSIS — E78.00 HYPERCHOLESTEROLEMIA: ICD-10-CM

## 2025-01-19 DIAGNOSIS — E11.9 TYPE 2 DIABETES MELLITUS WITHOUT COMPLICATION, WITHOUT LONG-TERM CURRENT USE OF INSULIN (MULTI): ICD-10-CM

## 2025-01-20 RX ORDER — BLOOD-GLUCOSE SENSOR
EACH MISCELLANEOUS
Qty: 2 EACH | Refills: 0 | Status: SHIPPED | OUTPATIENT
Start: 2025-01-20

## 2025-01-20 NOTE — TELEPHONE ENCOUNTER
Patient is requesting a refill on Freestyle Chava 3 sensor device     Last office visit: 12/17/2024

## 2025-02-04 LAB
ANION GAP SERPL CALCULATED.4IONS-SCNC: 8 MMOL/L (CALC) (ref 7–17)
BUN SERPL-MCNC: 13 MG/DL (ref 7–25)
BUN/CREAT SERPL: ABNORMAL (CALC) (ref 6–22)
CALCIUM SERPL-MCNC: 10.4 MG/DL (ref 8.6–10.4)
CHLORIDE SERPL-SCNC: 106 MMOL/L (ref 98–110)
CHOLEST SERPL-MCNC: 237 MG/DL
CHOLEST/HDLC SERPL: 3.6 (CALC)
CO2 SERPL-SCNC: 26 MMOL/L (ref 20–32)
CREAT SERPL-MCNC: 0.77 MG/DL (ref 0.5–1.05)
CRP SERPL-MCNC: <3 MG/L
EGFRCR SERPLBLD CKD-EPI 2021: 87 ML/MIN/1.73M2
ERYTHROCYTE [SEDIMENTATION RATE] IN BLOOD BY WESTERGREN METHOD: 2 MM/H
GLUCOSE SERPL-MCNC: 117 MG/DL (ref 65–99)
HDLC SERPL-MCNC: 65 MG/DL
LDLC SERPL CALC-MCNC: 148 MG/DL (CALC)
NONHDLC SERPL-MCNC: 172 MG/DL (CALC)
POTASSIUM SERPL-SCNC: 3.9 MMOL/L (ref 3.5–5.3)
SODIUM SERPL-SCNC: 140 MMOL/L (ref 135–146)
TRIGL SERPL-MCNC: 120 MG/DL

## 2025-02-10 ENCOUNTER — APPOINTMENT (OUTPATIENT)
Dept: RHEUMATOLOGY | Facility: CLINIC | Age: 65
End: 2025-02-10
Payer: COMMERCIAL

## 2025-02-10 VITALS
WEIGHT: 185.2 LBS | RESPIRATION RATE: 16 BRPM | DIASTOLIC BLOOD PRESSURE: 88 MMHG | HEIGHT: 65 IN | BODY MASS INDEX: 30.86 KG/M2 | TEMPERATURE: 98.2 F | SYSTOLIC BLOOD PRESSURE: 148 MMHG | HEART RATE: 90 BPM | OXYGEN SATURATION: 97 %

## 2025-02-10 DIAGNOSIS — M31.6 LARGE VESSEL VASCULITIS (MULTI): ICD-10-CM

## 2025-02-10 DIAGNOSIS — M31.6 GIANT CELL ARTERITIS (MULTI): Primary | ICD-10-CM

## 2025-02-10 DIAGNOSIS — I10 PRIMARY HYPERTENSION: ICD-10-CM

## 2025-02-10 PROBLEM — E11.9 TYPE 2 DIABETES MELLITUS, WITHOUT LONG-TERM CURRENT USE OF INSULIN (MULTI): Status: RESOLVED | Noted: 2023-11-21 | Resolved: 2025-02-10

## 2025-02-10 PROCEDURE — 3077F SYST BP >= 140 MM HG: CPT | Performed by: INTERNAL MEDICINE

## 2025-02-10 PROCEDURE — 99214 OFFICE O/P EST MOD 30 MIN: CPT | Performed by: INTERNAL MEDICINE

## 2025-02-10 PROCEDURE — 3008F BODY MASS INDEX DOCD: CPT | Performed by: INTERNAL MEDICINE

## 2025-02-10 PROCEDURE — 3079F DIAST BP 80-89 MM HG: CPT | Performed by: INTERNAL MEDICINE

## 2025-02-10 PROCEDURE — 1036F TOBACCO NON-USER: CPT | Performed by: INTERNAL MEDICINE

## 2025-02-10 RX ORDER — LOSARTAN POTASSIUM 50 MG/1
50 TABLET ORAL DAILY
Qty: 30 TABLET | Refills: 3 | Status: SHIPPED | OUTPATIENT
Start: 2025-02-10 | End: 2026-03-17

## 2025-02-10 ASSESSMENT — PATIENT HEALTH QUESTIONNAIRE - PHQ9
1. LITTLE INTEREST OR PLEASURE IN DOING THINGS: NOT AT ALL
SUM OF ALL RESPONSES TO PHQ9 QUESTIONS 1 AND 2: 0
2. FEELING DOWN, DEPRESSED OR HOPELESS: NOT AT ALL

## 2025-02-10 NOTE — PROGRESS NOTES
Subjective   Patient ID: Kimberley Murillo is a 64 y.o. female who presents for follow up regarding GCA and large vessel vasculitis.    HPI 64-year-old female here for follow-up regarding GCA and large vessel vasculitis.     The patient developed more joint pain following surgery for left hip labral tear 9/22..  She complained of some pain in her shoulders, neck, also fingers and toes..  Her shoulders, neck and hips hurt first thing in the morning.  She was stiff for 30 to 45 minutes in the morning.  Initial labs 1/24 were remarkable for CRP 6.11 (normal less than 1) and ESR 39.     The patient also complains of significant fatigue which started summer 2023.     In December 2023,  she started having difficulty with her right hand going numb and her fingers turning white when she is doing activities with her hands overhead, such as washing her hair.  She also notes that her arms got fatigued very quickly in this position, and she developed pain in her right proximal arm and right forearm.  She was having difficulty since December 2023 doing activities such as carrying a pot of spaghetti, as she notes that her right arm started hurting in her arms fatigue easily.  Her  thinks that she lost muscle mass in her proximal arms.  She notes that her fingers cramp if she is trying to use her right hand.  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     The patient has a history of migraine headaches for several years.  She was to get a migraine every 2 weeks for several years.  Over the last several months, she gets 1-2 migraines per week.  She describes her migraines as starting in her neck, radiate over the entire head.  The headaches are associated with photophobia.  She gets nausea but no vomiting.  She uses Excedrin Migraine for the headaches.     Dr. Tesfaye saw the patient January 25, 2024 for evaluation of upper extremity arterial insufficiency.  Physical exam  noted by Dr. Tesfaye 1/25/24   included nonpalpable right brachial, right radial, and right ulnar pulses.  She was noted to have a weak left brachial and radial pulse.  She had palpable femoral and popliteal pulses that seemed normal.     CTA of chest 1/27/24  shows multifocal disease within the right subclavian and axillary artery with diffuse fusiform narrowing of the distal subclavian artery with proximal occlusion of the axillary artery.  The appearance of the multifocal narrowing and vessel thickening raises the question of a nonatherosclerotic vasculitis/arteritis.  She also has mild to moderate left subclavian and axillary artery disease with smooth fusiform narrowing of the distal subclavian and proximal axillary artery.  Labs 1/24 were remarkable for ESR 39 (normal 0-30) and CRP 6.11 (normal less than 1).    She had bilateral temporal artery biopsies done February 5, 2024 with concerns for giant cell arteritis.  The biopsies did not show evidence of vasculitis.    Prednisone 30 mg twice daily was started 2/02/24.  Blood sugar started running as high as 500-600 after starting prednisone.  She required temporary insulin which has been stopped.    HbA1C Dec 2024 is 5.8.    She started Actemra 162 mg subcutaneous weekly 4/24.  Prednisone weaned off prednisone 8/12/24.    Current antihypertensives are amlodipine 10 mg daily, and chlorthalidone 25 mg daily.  She stopped losartan 100 mg 12/24 because blood pressures were running low and she was feeling lightheaded.  She does note that recent blood pressures have again been mildly elevated when she checks them at home (systolic blood pressures in the 140s).    Because of persistent claudication symptoms and her right upper extremity, she had procedure December 9, 2024 by Dr. Campo.  Procedure was right upper extremity angiogram with intravascular ultrasound guided atherectomy and balloon angioplasty.  She notes that she had immediate relief of her right upper extremity  symptoms.  Numbness that she was having in her hand resolved, and her skin color became more pink.    She had left upper extremity angioplasty of axillary artery 12/23/24.    She was advised to be on DAPT for at least 6 months.  She also started Zetia 10 mg daily.    She currently denies significant headaches.  She continues to deny jaw claudication.  She feels as though her vision intermittently worse but this has been a complaint for over 1 year.  Last eye exam showed no major issue.  She denies PMR symptoms.    She tried alendronate for prevention of glucocorticoid induced osteoporosis, but needed to stop it due to dyspepsia.    Labs 1/24: ESR 39 (0-30), CRP 6.11 (less than 1), JASSI negative, rheumatoid factor 18 (0-15), CCP less than 1, IgG4 normal, ANCA negative,   Labs July 2024: CMP normal except glucose 106 and calcium 10.7, CBC normal, ESR 3, CRP 0.21 (less than 1)  Labs August 2024: ESR less than 1, CRP less than 0.1 (less than 1), CBC normal, CMP normal except glucose 132, cholesterol 259, HDL 60, , triglycerides 196  Labs 9/24: BMP normal except glucose 139, ESR less than 1, CRP 0.18 (normal less than 1)  Labs November 2024: ESR less than 1, high-sensitivity CRP 0.18  Labs December 2024: Hemoglobin A1c 5.8, CMP normal except glucose 129, creatinine 1.07 (GFR 59), calcium 10.8 (normal 8.6-10.6), CBC normal except hematocrit 35.9 (36-46)  Labs February 2025: ESR 2, CRP less than 3 (normal less than 8), cholesterol 237, HDL 65, , triglycerides 120, BMP normal except glucose 117     CXR 12/22: Heart is not enlarged, lungs are clear.  Multilevel degenerative changes noted of the thoracic spine.     Cardiac calcium score August 2020: 0    DEXA April 2024: T-score -2.2 left femoral neck, T-score -1.5 left total hip, T-score normal lumbar spine.  Estimated 10-year fracture risk by FRAX is 3.1% for hip fracture and 17% for major osteoporotic fracture.     Medical problem list:  Type 2  "diabetes-currently managed with metformin 500 mg twice daily . Hemoglobin A1c was 7.8 December 2023.  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  Denies use of tobacco. Denies use of alcohol.  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     Family history:  Father- passed away from CAD, RA  Mother- diabetes, CAD  Paternal grandmother- RA    ROS:  General: Denies fevers or chills.  CV: Denies chest pain or palpitations.  Denies leg edema.  Lungs: Denies coughing or shortness of breath.  Skin: Denies rashes or nodules.  MS: Arm claudication has resolved. Her raynaud's is more pronounced in her hands.    Objective   Visit Vitals  /88 (BP Location: Right arm, Patient Position: Sitting, BP Cuff Size: Large adult)   Pulse 90   Temp 36.8 °C (98.2 °F) (Skin)   Resp 16   Ht 1.651 m (5' 5\")   Wt 84 kg (185 lb 3.2 oz)   SpO2 97%   BMI 30.82 kg/m²   OB Status Postmenopausal   Smoking Status Never   BSA 1.96 m²          Physical Exam  HEENT: PERRL, EOMI. S/p bilateral temporary biopsies.  Neck: Supple, no nodes.  CV: RRR, no MGR. no bruits heard over neck or upper chest.  Lungs: Clear, no rales or wheezes.  Abdomen: Soft, nontender. No hepatosplenomegaly.  Extremities:  No cyanosis, clubbing, or edema.  MS: No synovitis.  Skin: No rashes or nodules.  Pulses: Radial pulses noted bilaterally.  Hands are warm with good capillary refill.    Assessment/Plan     GCA-has large vessel involvement without cranial arteritis (had negative bilateral temporary biopsies).  Presented with complaints of " joint aching involving shoulders, neck, and  hips (less so hands and feet) since 9/22. (Likely PMR)  Complained of significant fatigue with 30 to 45 minutes of morning stiffness since summer 2023.  Also had right arm claudication since 12/23.  Initial labs remarkable for ESR of 39 and CRP 6.11 (normal less than 1).  Inflammatory markers are currently normal.    Claudication symptoms recurred when prednisone was dropped to 5 mg daily.  Right arm claudication resolved with procedure 12/09/24, which consisted of right upper extremity angiogram with IVUS guided atherectomy and DCB,  Also had left upper extremity angiogram with angioplasty of axillary artery December 23, 2024.  She was advised to remain on DAPT for at least 6 months.  Zetia was added.  Pathology report did not show vasculitis, but her initial symptoms of arm claudication, PMR, elevated ESR and CRP are strongly suggestive of giant cell arteritis.    Diabetes-hemoglobin A1c is now 5.8 (off prednisone since 8/12/24).    Osteopenia- unfortunately she is getting-abdominal pain after taking the alendronate (has happened after 3 doses).  I advised her to stop this for now.    Hyperlipidemia-she has a history of statin intolerance.  She tried 2 different statins previously which caused myalgias.  She started Zetia 10 mg daily.  Repatha is being considered.    Hypertension above goal.    Plan:  Resume losartan 50 mg daily.  Check labs 1 month: CBC with diff, CMP.  Check labs 5/25: ESR, CRP.  Stop Actemra end of April.  Follow-up in 3 months.

## 2025-02-10 NOTE — PATIENT INSTRUCTIONS
Resume losartan 50 mg daily.  Check labs 1 month: CBC with diff, CMP.  Check labs 5/25: ESR, CRP.  Stop Actemra end of April.  Follow-up in 3 months.

## 2025-02-22 DIAGNOSIS — E11.9 TYPE 2 DIABETES MELLITUS WITHOUT COMPLICATION, WITHOUT LONG-TERM CURRENT USE OF INSULIN (MULTI): ICD-10-CM

## 2025-02-24 RX ORDER — BLOOD-GLUCOSE SENSOR
EACH MISCELLANEOUS
Qty: 2 EACH | Refills: 5 | Status: SHIPPED | OUTPATIENT
Start: 2025-02-24

## 2025-03-10 DIAGNOSIS — M31.6 GIANT CELL ARTERITIS (MULTI): ICD-10-CM

## 2025-03-11 DIAGNOSIS — E03.9 HYPOTHYROIDISM, UNSPECIFIED TYPE: ICD-10-CM

## 2025-03-12 RX ORDER — LEVOTHYROXINE SODIUM 112 UG/1
112 TABLET ORAL DAILY
Qty: 90 TABLET | Refills: 0 | Status: SHIPPED | OUTPATIENT
Start: 2025-03-12

## 2025-03-24 ENCOUNTER — HOSPITAL ENCOUNTER (OUTPATIENT)
Dept: VASCULAR MEDICINE | Facility: CLINIC | Age: 65
Discharge: HOME | End: 2025-03-24
Payer: COMMERCIAL

## 2025-03-24 ENCOUNTER — OFFICE VISIT (OUTPATIENT)
Dept: CARDIOLOGY | Facility: CLINIC | Age: 65
End: 2025-03-24
Payer: COMMERCIAL

## 2025-03-24 VITALS
DIASTOLIC BLOOD PRESSURE: 74 MMHG | WEIGHT: 185 LBS | SYSTOLIC BLOOD PRESSURE: 134 MMHG | BODY MASS INDEX: 29.73 KG/M2 | OXYGEN SATURATION: 98 % | HEART RATE: 73 BPM | HEIGHT: 66 IN

## 2025-03-24 DIAGNOSIS — M31.6 GIANT CELL ARTERITIS: ICD-10-CM

## 2025-03-24 DIAGNOSIS — I70.218 ATHEROSCLEROSIS OF NATIVE ARTERIES OF EXTREMITIES WITH INTERMITTENT CLAUDICATION, OTHER EXTREMITY: ICD-10-CM

## 2025-03-24 DIAGNOSIS — I24.9 ACS (ACUTE CORONARY SYNDROME) (MULTI): ICD-10-CM

## 2025-03-24 DIAGNOSIS — Z98.62 S/P PERIPHERAL ARTERY ANGIOPLASTY: ICD-10-CM

## 2025-03-24 DIAGNOSIS — Z01.89 ENCOUNTER FOR OTHER SPECIFIED SPECIAL EXAMINATIONS: ICD-10-CM

## 2025-03-24 DIAGNOSIS — G43.809 OTHER MIGRAINE WITHOUT STATUS MIGRAINOSUS, NOT INTRACTABLE: ICD-10-CM

## 2025-03-24 DIAGNOSIS — I73.9 PERIPHERAL VASCULAR DISEASE, UNSPECIFIED (CMS-HCC): ICD-10-CM

## 2025-03-24 DIAGNOSIS — E78.00 HYPERCHOLESTEROLEMIA: Primary | ICD-10-CM

## 2025-03-24 DIAGNOSIS — R01.1 MURMUR: ICD-10-CM

## 2025-03-24 DIAGNOSIS — I77.1 SUBCLAVIAN ARTERIAL STENOSIS: ICD-10-CM

## 2025-03-24 LAB
ALBUMIN SERPL-MCNC: 4.6 G/DL (ref 3.6–5.1)
ALP SERPL-CCNC: 92 U/L (ref 37–153)
ALT SERPL-CCNC: 28 U/L (ref 6–29)
ANION GAP SERPL CALCULATED.4IONS-SCNC: 11 MMOL/L (CALC) (ref 7–17)
AST SERPL-CCNC: 21 U/L (ref 10–35)
BASOPHILS # BLD AUTO: 61 CELLS/UL (ref 0–200)
BASOPHILS NFR BLD AUTO: 1.1 %
BILIRUB SERPL-MCNC: 0.8 MG/DL (ref 0.2–1.2)
BUN SERPL-MCNC: 12 MG/DL (ref 7–25)
CALCIUM SERPL-MCNC: 10.3 MG/DL (ref 8.6–10.4)
CHLORIDE SERPL-SCNC: 105 MMOL/L (ref 98–110)
CO2 SERPL-SCNC: 25 MMOL/L (ref 20–32)
CREAT SERPL-MCNC: 0.72 MG/DL (ref 0.5–1.05)
EGFRCR SERPLBLD CKD-EPI 2021: 94 ML/MIN/1.73M2
EOSINOPHIL # BLD AUTO: 193 CELLS/UL (ref 15–500)
EOSINOPHIL NFR BLD AUTO: 3.5 %
ERYTHROCYTE [DISTWIDTH] IN BLOOD BY AUTOMATED COUNT: 12.2 % (ref 11–15)
GLUCOSE SERPL-MCNC: 120 MG/DL (ref 65–99)
HCT VFR BLD AUTO: 38.7 % (ref 35–45)
HGB BLD-MCNC: 13 G/DL (ref 11.7–15.5)
LYMPHOCYTES # BLD AUTO: 2096 CELLS/UL (ref 850–3900)
LYMPHOCYTES NFR BLD AUTO: 38.1 %
MCH RBC QN AUTO: 29.8 PG (ref 27–33)
MCHC RBC AUTO-ENTMCNC: 33.6 G/DL (ref 32–36)
MCV RBC AUTO: 88.8 FL (ref 80–100)
MONOCYTES # BLD AUTO: 550 CELLS/UL (ref 200–950)
MONOCYTES NFR BLD AUTO: 10 %
NEUTROPHILS # BLD AUTO: 2602 CELLS/UL (ref 1500–7800)
NEUTROPHILS NFR BLD AUTO: 47.3 %
PLATELET # BLD AUTO: 224 THOUSAND/UL (ref 140–400)
PMV BLD REES-ECKER: 9 FL (ref 7.5–12.5)
POTASSIUM SERPL-SCNC: 4 MMOL/L (ref 3.5–5.3)
PROT SERPL-MCNC: 6.4 G/DL (ref 6.1–8.1)
RBC # BLD AUTO: 4.36 MILLION/UL (ref 3.8–5.1)
SODIUM SERPL-SCNC: 141 MMOL/L (ref 135–146)
WBC # BLD AUTO: 5.5 THOUSAND/UL (ref 3.8–10.8)

## 2025-03-24 PROCEDURE — 93930 UPPER EXTREMITY STUDY: CPT | Performed by: INTERNAL MEDICINE

## 2025-03-24 PROCEDURE — 3075F SYST BP GE 130 - 139MM HG: CPT

## 2025-03-24 PROCEDURE — 99215 OFFICE O/P EST HI 40 MIN: CPT | Mod: 25

## 2025-03-24 PROCEDURE — 3008F BODY MASS INDEX DOCD: CPT

## 2025-03-24 PROCEDURE — 93922 UPR/L XTREMITY ART 2 LEVELS: CPT

## 2025-03-24 PROCEDURE — 1036F TOBACCO NON-USER: CPT

## 2025-03-24 PROCEDURE — 3078F DIAST BP <80 MM HG: CPT

## 2025-03-24 PROCEDURE — 93922 UPR/L XTREMITY ART 2 LEVELS: CPT | Performed by: INTERNAL MEDICINE

## 2025-03-24 PROCEDURE — 99205 OFFICE O/P NEW HI 60 MIN: CPT

## 2025-03-24 NOTE — PROGRESS NOTES
PCP: Jaci Hernandez MD  VS: Mera  Rheum: Elder-Jesus    Subjective   Kimberley Murillo is a 64 y.o. female who complains of BUE claudication R>L . Patient underwent temporal artery biopsy without evidence of giant cell arteritis and is s/p steroid treatment with improvement in inflammatory markers and limited improvement of BUE symptoms. She initially reported BUE white/ashy discoloration with pain and inability to perform her typical activities.  Her symptoms are greater on the right side, however she reported symptoms on her left upper extremity have also progressively gotten worse. She is now s/p BUE angiogram with Dr. Campo on 12/9/2024 and 12/23/2024 with RUE IVUS-guided DAART of R axillary into brachial artery  with IVUS-guided DCB of R subclavian artery and LUE severe disease s/p IVUS guided DCB on 12/23/2024.  She reports symptoms have resolved since procedure. She is being followed by rheumatology. She has previously had statin intolerance. She denies any CP, SOB, palpitations, lightheadedness, syncope, orthopnea, PND, lower extremity edema.      Review of Systems:  Otherwise, limited cardiovascular review of systems is negative.    Past Medical History:  She has a past medical history of Delayed emergence from general anesthesia (1987), Fibrocystic breast, Goiter (1987), Headache, unspecified, Hyperparathyroidism (Multi) (1987), Hypertension (2019), Inflammation of arteries (CMS-HCC) (2024), Osteoarthritis (2024), Personal history of diseases of the blood and blood-forming organs and certain disorders involving the immune mechanism, Personal history of pneumonia (recurrent), PONV (postoperative nausea and vomiting) (1987), Spinal headache (1992), and Thyroid nodule (1987).    Surgical History:   She has a past surgical history that includes Other surgical history (11/03/2022); Other surgical history (11/03/2022); Other surgical history (11/03/2022); Other surgical history (11/03/2022); Other surgical  history (11/03/2022); Other surgical history (11/03/2022); Other surgical history (11/03/2022); BI stereotactic guided breast right localization and biopsy (Right, 12/18/2023); Breast biopsy (Right); Invasive Vascular Procedure (Bilateral, 12/09/2024); Invasive Vascular Procedure (N/A, 12/09/2024); Invasive Vascular Procedure (N/A, 12/09/2024); Breast cyst excision; Invasive Vascular Procedure (Left, 12/23/2024); Invasive Vascular Procedure (N/A, 12/23/2024); and Invasive Vascular Procedure (N/A, 12/23/2024).    Family History:   Family History   Problem Relation Name Age of Onset    Arthritis Mother Kelsey Zhong     Asthma Mother Kelsey Zhong     COPD Mother Kelsey Zhong     Diabetes Mother Kelsey Zhong     Hypertension Mother Kelsey Zhong     Kidney disease Mother Kelsey Zhong     Vision loss Mother Kelsey Zhong     Arthritis Father Nima Head Sr.     Diabetes Father Nima Head Sr.     Heart disease Father Nima Adilene Sr.     Hypertension Father Nima Adilene Sr.     Vision loss Father Nima Adilene Sr.     Birth defects Maternal Grandmother Fathers mother     Breast cancer Father's Sister Both Sisters     Learning disabilities Father's Brother Laith Head      Social History:   Tobacco Use: Low Risk  (3/24/2025)    Patient History     Smoking Tobacco Use: Never     Smokeless Tobacco Use: Never     Passive Exposure: Not on file     Outpatient Medications:  Current Outpatient Medications:     amLODIPine (Norvasc) 10 mg tablet, Take 1 tablet (10 mg) by mouth once daily., Disp: 90 tablet, Rfl: 3    aspirin 81 mg EC tablet, Take 1 tablet (81 mg) by mouth once daily., Disp: 90 tablet, Rfl: 1    blood-glucose sensor (FreeStyle Chava 3 Sensor) device, APPLY 1 SENSOR TO THE BACK OF THE ARM FOR CONTINUOUS BLOOD SUGAR MONITORING. REMOVE SENSOR AND REPLACE WITH A NEW SENSOR EVERY 14 DAYS, Disp: 2 each, Rfl: 5    chlorthalidone (Hygroton) 25 mg tablet, Take 1 tablet (25 mg) by mouth once daily., Disp: , Rfl:      "clopidogrel (Plavix) 75 mg tablet, Take 1 tablet by mouth once daily, Disp: 30 tablet, Rfl: 11    ezetimibe (Zetia) 10 mg tablet, Take 1 tablet by mouth once daily, Disp: 30 tablet, Rfl: 11    losartan (Cozaar) 50 mg tablet, Take 1 tablet (50 mg) by mouth once daily., Disp: 30 tablet, Rfl: 3    tocilizumab (Actemra ACTPen) 162 mg/0.9 mL, Inject 0.9 mL (162 mg) under the skin every 7 days., Disp: 12 each, Rfl: 3    Unithroid 112 mcg tablet, Take 1 tablet (112 mcg) by mouth early in the morning.. 30 TO 60 MINUTES BEFORE BREAKFAST. TAKE ON AN EMPTY STOMACH AT THE SAME TIME EACH DAY.  Due for TSH., Disp: 90 tablet, Rfl: 0     Allergies:  Meloxicam, Salsalate, Glimepiride, Hydralazine, Labetalol, Lisinopril, Statins-hmg-coa reductase inhibitors, Adhesive, Adhesive tape-silicones, and Latex     Objective   Vital Signs:  /74 (BP Location: Left arm, Patient Position: Sitting)   Pulse 73   Ht 1.67 m (5' 5.75\")   Wt 83.9 kg (185 lb)   SpO2 98%   BMI 30.09 kg/m²     Physical Exam:  General: no acute distress  HEENT: EOMI, no scleral icterus.  Lungs: Clear to auscultation bilaterally without wheezing, rales, or rhonchi.  Cardiovascular: Regular rate and rhythm, soft ejection murmur.  There are bilateral subclavian bruits  Abdomen: Soft, nontender, nondistended. Bowel sounds present.  Extremities: The upper extremities are warm, decreased radial pulses bilaterally.  There is 2+ PT bilaterally.  Neurologic: Alert and oriented x3.    Pertinent Recent Cardiovascular Studies (personally reviewed):  Vascular studies:  Arterial  duplex upper extremity 3/24/2025:  Normal with normal bilateral wrist brachial index, right WRI-1.22 and left WRI-1.19.    ARIAN/TBI UE 1/24/24:   Dampened waveforms BUE with diminished brachial pressures      Renal duplex 9/16/24: normal velocities and size of kidneys bilaterally    Cardiac studies:  Echocardiogram 12/7/23:  Left and right functions are preserved.  Asc Ao 3.3 cm. No PFO " latoya.    Laboratory values:  CMP:  Recent Labs     02/03/25  1204 12/17/24  1534 11/14/24  1148 08/14/24  0852 07/01/24  1308 06/04/24  1257 05/14/24  0744 03/16/24  0833 08/18/22  1159 07/25/22  0940    138 140 142 139 142 143 138   < > 141   K 3.9 3.9 3.8 3.8 3.8 3.3* 3.8 4.5   < > 3.8    101 101 104 102 106 104 99   < > 102   CO2 26 29 30 32 26 29 30 29   < > 29   ANIONGAP 8 12 13 10 15 10 13 15   < > 14   BUN 13 16 15 11 16 13 15 16   < > 9   CREATININE 0.77 1.07* 0.75 0.82 0.74 0.65 0.65 0.63   < > 0.74   EGFR 87 59* >90 81 >90 >90 >90 >90   < >  --    MG  --   --   --   --   --  2.00  --   --   --  2.09    < > = values in this interval not displayed.     Recent Labs     12/17/24  1534 08/14/24  0852 07/01/24  1308 06/04/24  1257 05/14/24  0744   ALBUMIN 4.6 4.3 4.8 4.6 4.0   ALKPHOS 68 73 59 47 45   ALT 27 35 25 32 35   AST 22 25 16 19 17   BILITOT 0.7 1.0 0.6 0.7 0.6     CBC:  Recent Labs     12/17/24  1534 11/14/24  1148 08/14/24  0852 07/01/24  1308 06/04/24  1257 05/14/24  0744 03/16/24  0833 01/31/24  1613   WBC 10.0 8.4 5.3 10.3 7.3 7.9 13.6* 9.1   HGB 12.4 13.6 12.7 14.2 13.9 12.6 13.0 12.3   HCT 35.9* 41.0 39.0 41.6 41.0 39.7 40.2 39.3    278 217 321 223 209 266 379   MCV 86 88 92 91 95 95 85 86     HEME/ENDO:  Recent Labs     12/05/24  0915 05/20/24  1542 12/29/23  1128 05/22/23  0951 07/25/22  0940 07/25/22  0940 11/09/21  0903   TSH 0.07*  --   --  1.50  --  9.39* 1.78   HGBA1C 5.8* 6.9* 7.8* 6.6*   < > 7.0*  --     < > = values in this interval not displayed.      CARDIAC:   Recent Labs     06/04/24  1440 06/04/24  1257   TROPHS 8 7     Recent Labs     08/14/24  0852 05/14/24  0744 03/16/24  0833 05/22/23  0951 07/25/22  0940 11/09/21  0903   CHOL 259* 256* 223* 211* 217* 249*   LDLF 159* 140* 128* 134* 132* 165*   HDL 60.7 88.9 65.7 56.1 48.3 53.0   TRIG 196* 135 147 107 183* 154*     MICRO:   Recent Labs     02/03/25  1204 09/30/24  1518 08/14/24  0852 07/01/24  1308  05/14/24  0744   CRP <3.0 0.18 <0.10 0.21 <0.10   ESR normalized     I have personally reviewed most recent PCP, cardiology, vascular, and/or podiatry documentation.    Assessment/Plan   64 y.o. female with  BUE sxs R>L with subclavian occlusion on RUE  in the background of dyslipidemia, hypertension, and arteritis (GCA  or Takaysu - temporal artery biopsy negative) .    Plan:  Claudication BUE, patient reports discoloration to bilateral and pain upper extremities limiting her daily activities. She is no s/p upper extremity peripheral angiogram with RUE IVUS-guided DAART of R axillary into brachial artery  with IVUS-guided DCB of R subclavian artery and LUE severe disease s/p IVUS guided DCB on 12/23/2024 with Dr. Campo. She reports after angiogram her symptoms have resolved.  ARIAN/TBI of upper extremity showed dampened waveforms BUE with diminished brachial pressures (1/24/2024).  Arterial duplex of upper extremities completed today showing is normal with normal bilateral wrist brachial index, right WRI-1.22 and left WRI-1.19.  Repeat CRP/ESR normal and renal function normal.  Continue DAPT with aspirin and Plavix x6 months and then aspirin indefinitely.    HLD, 2/3/2025 , HDL 65, triglycerides 120.  She was started on Zetia 10 mg daily and possible initiation of PCSK9 was discussed at previous visit. She is tolerating Zetia without issue, she reported previously had bleeding when she initially tried this medication however this time she has no symptoms. Will repeat fasting labs prior to her next visit and Lp(a).     HTN, stable, /74 today. She is on amlodipine 10 mg daily and losartan 50 mg daily. Goal BP <130/80.     Aortic murmur, will have her undergo echocardiogram to evaluate aortic valve. She does complain of exertional fatigue.     Severe migraine, she did undergo limited echo with bubble study that showed possible pulmonary EV malformation and negative for PFO.    Follow-up with Dr. Campo in 3  months.    Lizy Zuñiga, CNP

## 2025-04-17 ENCOUNTER — TELEPHONE (OUTPATIENT)
Dept: PRIMARY CARE | Facility: CLINIC | Age: 65
End: 2025-04-17
Payer: COMMERCIAL

## 2025-04-17 NOTE — TELEPHONE ENCOUNTER
Pt wants to know when she needs to stop taking the Actemra shots - was it beginning of April or the end of April  - Also has been experiencing worsening leg cramps, especially at night, is this a side effect or Actemra? - please advise

## 2025-05-01 DIAGNOSIS — M31.6 GIANT CELL ARTERITIS: ICD-10-CM

## 2025-05-12 ENCOUNTER — APPOINTMENT (OUTPATIENT)
Dept: RHEUMATOLOGY | Facility: CLINIC | Age: 65
End: 2025-05-12
Payer: COMMERCIAL

## 2025-05-12 VITALS
HEART RATE: 72 BPM | OXYGEN SATURATION: 98 % | WEIGHT: 181 LBS | BODY MASS INDEX: 29.09 KG/M2 | DIASTOLIC BLOOD PRESSURE: 64 MMHG | HEIGHT: 66 IN | SYSTOLIC BLOOD PRESSURE: 120 MMHG | RESPIRATION RATE: 16 BRPM

## 2025-05-12 DIAGNOSIS — M31.6 GIANT CELL ARTERITIS: Primary | ICD-10-CM

## 2025-05-12 DIAGNOSIS — E11.65 TYPE 2 DIABETES MELLITUS WITH HYPERGLYCEMIA, WITHOUT LONG-TERM CURRENT USE OF INSULIN: ICD-10-CM

## 2025-05-12 LAB
CRP SERPL-MCNC: <3 MG/L
ERYTHROCYTE [SEDIMENTATION RATE] IN BLOOD BY WESTERGREN METHOD: 2 MM/H

## 2025-05-12 PROCEDURE — 3008F BODY MASS INDEX DOCD: CPT | Performed by: INTERNAL MEDICINE

## 2025-05-12 PROCEDURE — 4010F ACE/ARB THERAPY RXD/TAKEN: CPT | Performed by: INTERNAL MEDICINE

## 2025-05-12 PROCEDURE — 3074F SYST BP LT 130 MM HG: CPT | Performed by: INTERNAL MEDICINE

## 2025-05-12 PROCEDURE — 99214 OFFICE O/P EST MOD 30 MIN: CPT | Performed by: INTERNAL MEDICINE

## 2025-05-12 PROCEDURE — 3078F DIAST BP <80 MM HG: CPT | Performed by: INTERNAL MEDICINE

## 2025-05-12 ASSESSMENT — PAIN SCALES - GENERAL: PAINLEVEL_OUTOF10: 5

## 2025-05-12 NOTE — PROGRESS NOTES
Subjective   Patient ID: Kimberley Murillo is a 64 y.o. female who presents for follow up regarding GCA and large vessel vasculitis.    HPI 64-year-old female here for follow-up regarding GCA and large vessel vasculitis.    She took prednisone 2/24- 8/24.  She took Actemra 4/24- 4/25.  She currently denies PMR, headaches, or jaw claudication.  Also denies arm claudication.     The patient developed more joint pain following surgery for left hip labral tear 9/22..  She complained of some pain in her shoulders, neck, also fingers and toes..  Her shoulders, neck and hips hurt first thing in the morning.  She was stiff for 30 to 45 minutes in the morning.  Initial labs 1/24 were remarkable for CRP 6.11 (normal less than 1) and ESR 39.     The patient also complains of significant fatigue which started summer 2023.     In December 2023,  she started having difficulty with her right hand going numb and her fingers turning white when she is doing activities with her hands overhead, such as washing her hair.  She also notes that her arms got fatigued very quickly in this position, and she developed pain in her right proximal arm and right forearm.  She was having difficulty since December 2023 doing activities such as carrying a pot of spaghetti, as she notes that her right arm started hurting in her arms fatigue easily.  Her  thinks that she lost muscle mass in her proximal arms.  She notes that her fingers cramp if she is trying to use her right hand.  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     The patient has a history of migraine headaches for several years.  She was to get a migraine every 2 weeks for several years.  She describes her migraines as starting in her neck, radiate over the entire head.  The headaches are associated with photophobia.  She gets nausea but no vomiting.  She uses Excedrin Migraine for the headaches.     Dr. Tesfaye saw the patient  January 25, 2024 for evaluation of upper extremity arterial insufficiency.  Physical exam noted by Dr. Tesfaye 1/25/24   included nonpalpable right brachial, right radial, and right ulnar pulses.  She was noted to have a weak left brachial and radial pulse.  She had palpable femoral and popliteal pulses that seemed normal.     CTA of chest 1/27/24  shows multifocal disease within the right subclavian and axillary artery with diffuse fusiform narrowing of the distal subclavian artery with proximal occlusion of the axillary artery.  The appearance of the multifocal narrowing and vessel thickening raises the question of a nonatherosclerotic vasculitis/arteritis.  She also has mild to moderate left subclavian and axillary artery disease with smooth fusiform narrowing of the distal subclavian and proximal axillary artery.  Labs 1/24 were remarkable for ESR 39 (normal 0-30) and CRP 6.11 (normal less than 1).    She had bilateral temporal artery biopsies done February 5, 2024 with concerns for giant cell arteritis.  The biopsies did not show evidence of vasculitis.    Prednisone 30 mg twice daily was started 2/02/24.  Blood sugar started running as high as 500-600 after starting prednisone.  She required temporary insulin which has been stopped.    HbA1C Dec 2024 is 5.8.    She started Actemra 162 mg subcutaneous weekly 4/24.  Prednisone weaned off prednisone 8/12/24.  She stopped Actemra 4/25.    Current antihypertensives are amlodipine 10 mg daily, and chlorthalidone 25 mg daily.  She stopped losartan 100 mg 12/24 because blood pressures were running low and she was feeling lightheaded.  She does note that recent blood pressures have again been mildly elevated when she checks them at home (systolic blood pressures in the 140s).    Because of persistent claudication symptoms and her right upper extremity, she had procedure December 9, 2024 by Dr. Campo.  Procedure was right upper extremity angiogram with intravascular  ultrasound guided atherectomy and balloon angioplasty.  She notes that she had immediate relief of her right upper extremity symptoms.  Numbness that she was having in her hand resolved, and her skin color became more pink.    She had left upper extremity angioplasty of axillary artery 12/23/24.    She was advised to be on DAPT for at least 6 months.  She also started Zetia 10 mg daily.    She currently denies significant headaches.  She continues to deny jaw claudication.  She feels as though her vision intermittently worsens but this has been a complaint for over 1 year.  Last eye exam showed no major issue.  She denies PMR symptoms.    She tried alendronate for prevention of glucocorticoid induced osteoporosis, but needed to stop it due to dyspepsia.    Labs 1/24: ESR 39 (0-30), CRP 6.11 (less than 1), JASSI negative, rheumatoid factor 18 (0-15), CCP less than 1, IgG4 normal, ANCA negative,   Labs February 2025: ESR 2, CRP less than 3 (normal less than 8), cholesterol 237, HDL 65, , triglycerides 120, BMP normal except glucose 117       CXR 12/22: Heart is not enlarged, lungs are clear.  Multilevel degenerative changes noted of the thoracic spine.     Cardiac calcium score August 2020: 0    DEXA April 2024: T-score -2.2 left femoral neck, T-score -1.5 left total hip, T-score normal lumbar spine.  Estimated 10-year fracture risk by FRAX is 3.1% for hip fracture and 17% for major osteoporotic fracture.     Medical problem list:  Type 2 diabetes-currently managed with metformin 500 mg twice daily . Hemoglobin A1c was 7.8 December 2023.  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  Denies use of tobacco. Denies use of alcohol.  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     Family history:  Father- passed away from CAD, RA  Mother- diabetes, CAD  Paternal grandmother- RA    ROS:  General: Denies fevers or chills.  CV: Denies chest pain or palpitations.  Denies leg edema.  Lungs: Denies coughing or shortness of breath.  Skin: Denies rashes or nodules.  MS: Arm claudication has resolved. Her raynaud's is more pronounced in her hands.    Objective   Visit Vitals  /64   Pulse 72   Resp 16   Ht 1.67 m (5' 5.75\")   Wt 82.1 kg (181 lb)   SpO2 98%   BMI 29.44 kg/m²   OB Status Postmenopausal   Smoking Status Never   BSA 1.95 m²    Right arm manual /62   Left arm manual /64      Physical Exam  HEENT: PERRL, EOMI. S/p bilateral temporary biopsies.  Neck: Supple, no nodes.  CV: RRR, Grade 2/6 systolic murmur over right upper sternal border, ? Radiation to carotids.  Lungs: Clear, no rales or wheezes.  Abdomen: Soft, nontender. No hepatosplenomegaly.  Extremities:  No cyanosis, clubbing, or edema.  MS: No synovitis.  Skin: No rashes or nodules.  Pulses: Radial pulses 2+ bilaterally.  Hands are warm with good capillary refill.    Assessment/Plan   Problem List Items Addressed This Visit           ICD-10-CM    Giant cell arteritis - Primary M31.6    Relevant Orders    C-Reactive Protein    Sedimentation Rate    BMI 29.0-29.9,adult Z68.29       GCA-has large vessel involvement without cranial arteritis (had negative bilateral temporary biopsies).  Presented with complaints of joint aching involving shoulders, neck, and  hips (less so hands and feet) since 9/22. (Likely PMR)  Complained of significant fatigue with 30 to 45 minutes of morning stiffness since summer 2023.  Also had right arm claudication since 12/23.  Initial labs remarkable for ESR of 39 and CRP 6.11 (normal less than " 1).  Inflammatory markers are currently normal.    Claudication symptoms recurred when prednisone was dropped to 5 mg daily.  Right arm claudication resolved with procedure 12/09/24, which consisted of right upper extremity angiogram with IVUS guided atherectomy and DCB,  Also had left upper extremity angiogram with angioplasty of axillary artery December 23, 2024.  She was advised to remain on DAPT for at least 6 months.  Zetia was added.  Pathology report did not show vasculitis, but her initial symptoms of arm claudication, PMR, elevated ESR and CRP are strongly suggestive of giant cell arteritis.  Took prednisone 2/24- 8/24.  Took Actemra 4/24- 4/25.    Diabetes-hemoglobin A1c is now 5.8 (off prednisone since 8/12/24).    Osteopenia- unfortunately she is getting-abdominal pain after taking the alendronate (has happened after 3 doses).  I advised her to stop this for now.    Hyperlipidemia-she has a history of statin intolerance.  She tried 2 different statins previously which caused myalgias.  She started Zetia 10 mg daily.  Repatha is being considered.    Hypertension at goal.    Plan:  Check labs 8/25: ESR, CRP.  Follow-up in 3 months.

## 2025-05-15 ENCOUNTER — HOSPITAL ENCOUNTER (OUTPATIENT)
Dept: CARDIOLOGY | Facility: CLINIC | Age: 65
Discharge: HOME | End: 2025-05-15
Payer: COMMERCIAL

## 2025-05-15 DIAGNOSIS — R01.1 MURMUR: ICD-10-CM

## 2025-05-15 LAB
AORTIC VALVE PEAK VELOCITY: 1.85 M/S
AV PEAK GRADIENT: 14 MMHG
AVA (PEAK VEL): 2.47 CM2
EJECTION FRACTION APICAL 4 CHAMBER: 68.9
EJECTION FRACTION: 63 %
LEFT ATRIUM VOLUME AREA LENGTH INDEX BSA: 18.4 ML/M2
LEFT VENTRICLE INTERNAL DIMENSION DIASTOLE: 3.8 CM (ref 3.5–6)
LEFT VENTRICULAR OUTFLOW TRACT DIAMETER: 1.93 CM
MITRAL VALVE E/A RATIO: 1.04
RIGHT VENTRICLE FREE WALL PEAK S': 18 CM/S
RIGHT VENTRICLE PEAK SYSTOLIC PRESSURE: 26.9 MMHG
TRICUSPID ANNULAR PLANE SYSTOLIC EXCURSION: 2.6 CM

## 2025-05-15 PROCEDURE — 93306 TTE W/DOPPLER COMPLETE: CPT

## 2025-05-15 PROCEDURE — 93306 TTE W/DOPPLER COMPLETE: CPT | Performed by: INTERNAL MEDICINE

## 2025-05-16 PROBLEM — E11.65 TYPE 2 DIABETES MELLITUS WITH HYPERGLYCEMIA, WITHOUT LONG-TERM CURRENT USE OF INSULIN: Status: ACTIVE | Noted: 2025-05-16

## 2025-05-23 ENCOUNTER — TELEPHONE (OUTPATIENT)
Dept: CARDIOLOGY | Facility: CLINIC | Age: 65
End: 2025-05-23
Payer: COMMERCIAL

## 2025-05-23 NOTE — TELEPHONE ENCOUNTER
Pt called today. She had an echo done on 5/15/25. Last seen by Cristiana on 3/24/25. Next OV is with Dr Campo on 6/26.    She is asking if Cristiana could call her and discuss.

## 2025-05-29 ENCOUNTER — TELEPHONE (OUTPATIENT)
Dept: PRIMARY CARE | Facility: CLINIC | Age: 65
End: 2025-05-29
Payer: COMMERCIAL

## 2025-05-30 DIAGNOSIS — E11.65 TYPE 2 DIABETES MELLITUS WITH HYPERGLYCEMIA, WITHOUT LONG-TERM CURRENT USE OF INSULIN: Primary | ICD-10-CM

## 2025-05-30 RX ORDER — BLOOD-GLUCOSE SENSOR
EACH MISCELLANEOUS
Qty: 3 EACH | Refills: 11 | Status: SHIPPED | OUTPATIENT
Start: 2025-05-30

## 2025-06-04 PROBLEM — E66.9 OBESITY DUE TO ENERGY IMBALANCE: Status: RESOLVED | Noted: 2024-01-31 | Resolved: 2025-06-04

## 2025-06-10 ENCOUNTER — TELEPHONE (OUTPATIENT)
Dept: PRIMARY CARE | Facility: CLINIC | Age: 65
End: 2025-06-10
Payer: COMMERCIAL

## 2025-06-10 DIAGNOSIS — E03.9 HYPOTHYROIDISM, UNSPECIFIED TYPE: ICD-10-CM

## 2025-06-10 RX ORDER — LEVOTHYROXINE SODIUM 112 UG/1
112 TABLET ORAL DAILY
Qty: 5 TABLET | Refills: 0 | Status: SHIPPED | OUTPATIENT
Start: 2025-06-10 | End: 2025-06-17 | Stop reason: SDUPTHER

## 2025-06-10 NOTE — TELEPHONE ENCOUNTER
Patient is supposed to have her thyroid med adjusted on Monday. She needs 5 more pills of the unithroid 112mcg tablet to get her to her appointment.       [facility] [address] [place] [id number]  Phone: [phone] Fax: [phone]

## 2025-06-16 ENCOUNTER — APPOINTMENT (OUTPATIENT)
Dept: PRIMARY CARE | Facility: CLINIC | Age: 65
End: 2025-06-16
Payer: COMMERCIAL

## 2025-06-16 VITALS
BODY MASS INDEX: 29.32 KG/M2 | HEART RATE: 80 BPM | HEIGHT: 66 IN | WEIGHT: 182.4 LBS | OXYGEN SATURATION: 97 % | SYSTOLIC BLOOD PRESSURE: 124 MMHG | TEMPERATURE: 97.2 F | DIASTOLIC BLOOD PRESSURE: 74 MMHG

## 2025-06-16 DIAGNOSIS — I10 BENIGN ESSENTIAL HYPERTENSION: ICD-10-CM

## 2025-06-16 DIAGNOSIS — E03.9 ACQUIRED HYPOTHYROIDISM: Primary | ICD-10-CM

## 2025-06-16 DIAGNOSIS — T46.6X5A STATIN MYOPATHY: ICD-10-CM

## 2025-06-16 DIAGNOSIS — T46.6X5D ADVERSE EFFECT OF ANTIHYPERLIPIDEMIC DRUG, SUBSEQUENT ENCOUNTER: ICD-10-CM

## 2025-06-16 DIAGNOSIS — G72.0 STATIN MYOPATHY: ICD-10-CM

## 2025-06-16 DIAGNOSIS — E11.65 TYPE 2 DIABETES MELLITUS WITH HYPERGLYCEMIA, WITHOUT LONG-TERM CURRENT USE OF INSULIN: ICD-10-CM

## 2025-06-16 DIAGNOSIS — R22.1 NECK SWELLING: ICD-10-CM

## 2025-06-16 DIAGNOSIS — E78.00 HYPERCHOLESTEROLEMIA: ICD-10-CM

## 2025-06-16 DIAGNOSIS — R53.83 FATIGUE, UNSPECIFIED TYPE: ICD-10-CM

## 2025-06-16 PROCEDURE — 99214 OFFICE O/P EST MOD 30 MIN: CPT | Performed by: INTERNAL MEDICINE

## 2025-06-16 PROCEDURE — 3074F SYST BP LT 130 MM HG: CPT | Performed by: INTERNAL MEDICINE

## 2025-06-16 PROCEDURE — 1036F TOBACCO NON-USER: CPT | Performed by: INTERNAL MEDICINE

## 2025-06-16 PROCEDURE — 3008F BODY MASS INDEX DOCD: CPT | Performed by: INTERNAL MEDICINE

## 2025-06-16 PROCEDURE — 4010F ACE/ARB THERAPY RXD/TAKEN: CPT | Performed by: INTERNAL MEDICINE

## 2025-06-16 PROCEDURE — 3078F DIAST BP <80 MM HG: CPT | Performed by: INTERNAL MEDICINE

## 2025-06-16 ASSESSMENT — ENCOUNTER SYMPTOMS
SINUS PRESSURE: 0
SORE THROAT: 1
COUGH: 0
FEVER: 0
PALPITATIONS: 0
SINUS PAIN: 0
FATIGUE: 1
RHINORRHEA: 0
ABDOMINAL PAIN: 0
CHILLS: 0
SHORTNESS OF BREATH: 0

## 2025-06-16 NOTE — PROGRESS NOTES
CHIEF COMPLAINT  Hypertension    HISTORY OF PRESENT ILLNESS  Kimberley Murillo is a 64 y.o. female presents today for follow up of Hypertension    HPI    BP has been better controlled lately.  Blood sugar also well controlled.    Last week, had 2 days when she felt much more tired than usual, stayed in bed a lot and had brain fog.  Left ear pain, also tender lymph node in neck.  Has felt better over the last couple days.    2 daughters with mono recently.   Patient denies congestion, fever, chills, rash.     REVIEW OF SYSTEMS  Review of Systems   Constitutional:  Positive for fatigue. Negative for chills and fever.   HENT:  Positive for ear pain and sore throat. Negative for congestion, rhinorrhea, sinus pressure and sinus pain.    Respiratory:  Negative for cough and shortness of breath.    Cardiovascular:  Negative for chest pain, palpitations and leg swelling.   Gastrointestinal:  Negative for abdominal pain.   Skin:  Negative for rash.       ALLERGIES  Meloxicam, Salsalate, Glimepiride, Hydralazine, Labetalol, Lisinopril, Statins-hmg-coa reductase inhibitors, Adhesive, Adhesive tape-silicones, and Latex    MEDICATIONS  Current Outpatient Medications   Medication Instructions    Actemra ACTPen 162 mg, subcutaneous, Every 7 days    amLODIPine (NORVASC) 10 mg, oral, Daily    blood-glucose sensor (FreeStyle Chava 3 Plus Sensor) device Use to check blood sugar 4x daily (QACHS)    clopidogrel (PLAVIX) 75 mg, oral, Daily    ezetimibe (ZETIA) 10 mg, oral, Daily    losartan (COZAAR) 50 mg, oral, Daily    Unithroid 112 mcg, oral, Daily, 30 TO 60 MINUTES BEFORE BREAKFAST. TAKE ON AN EMPTY STOMACH AT THE SAME TIME EACH DAY.  Due for TSH.       TOBACCO USE  Tobacco Use History[1]    DEPRESSION SCREEN  Over the past 2 weeks, how often have you been bothered by any of the following problems?  Little interest or pleasure in doing things: Not at all  Feeling down, depressed, or hopeless: Not at all    SURGICAL HISTORY  Past  Surgical History:  12/18/2023: BI STEREOTACTIC GUIDED BREAST RIGHT LOCALIZATION AND   BIOPSY; Right      Comment:  BI STEREOTACTIC GUIDED BREAST RIGHT LOCALIZATION AND                BIOPSY 12/18/2023 Cori Villarreal MD [facility]  No date: BREAST BIOPSY; Right      Comment:  Excisional  No date: BREAST CYST EXCISION  12/09/2024: INVASIVE VASCULAR PROCEDURE; Bilateral      Comment:  Procedure: Upper Extremity Angiogram;  Surgeon: Theo Campo MD;  Location: [facility];  Service:                Cardiovascular;  Laterality: Bilateral;  NEEDS ECHO PRIOR  12/09/2024: INVASIVE VASCULAR PROCEDURE; N/A      Comment:  Procedure: IVUS noncoronary initial;  Surgeon: Theo Campo MD;  Location: [facility];  Service:                Cardiovascular;  Laterality: N/A;  12/09/2024: INVASIVE VASCULAR PROCEDURE; N/A      Comment:  Procedure: Angioplasty - Upper Extremity;  Surgeon: Theo Campo MD;  Location: [facility];  Service:                Cardiovascular;  Laterality: N/A;  12/23/2024: INVASIVE VASCULAR PROCEDURE; Left      Comment:  Procedure: Upper Extremity Intervention;  Surgeon: Theo Campo MD;  Location: [facility];  Service:                Cardiovascular;  Laterality: Left;  [id number] staged                intervention LUE  12/23/2024: INVASIVE VASCULAR PROCEDURE; N/A      Comment:  Procedure: Upper Extremity Angiogram;  Surgeon: Theo Campo MD;  Location: [facility];  Service:                Cardiovascular;  Laterality: N/A;  12/23/2024: INVASIVE VASCULAR PROCEDURE; N/A      Comment:  Procedure: IVUS noncoronary initial;  Surgeon: Theo Campo MD;  Location: [facility];  Service:                Cardiovascular;  Laterality: N/A;  11/03/2022: OTHER SURGICAL HISTORY      Comment:  Cholecystectomy  11/03/2022: OTHER SURGICAL HISTORY      Comment:  Tonsillectomy  11/03/2022: OTHER SURGICAL HISTORY      Comment:   "Lower leg fracture repair  11/03/2022: OTHER SURGICAL HISTORY      Comment:  Thyroidectomy  11/03/2022: OTHER SURGICAL HISTORY      Comment:  Lithotripsy  11/03/2022: OTHER SURGICAL HISTORY      Comment:  Hip labral tear repair  11/03/2022: OTHER SURGICAL HISTORY      Comment:  Lumpectomy       OBJECTIVE    /74   Pulse 80   Temp 36.2 °C (97.2 °F)   Ht 1.67 m (5' 5.75\")   Wt 82.7 kg (182 lb 6.4 oz)   SpO2 97%   BMI 29.66 kg/m²    BMI: Estimated body mass index is 29.66 kg/m² as calculated from the following:    Height as of this encounter: 1.67 m (5' 5.75\").    Weight as of this encounter: 82.7 kg (182 lb 6.4 oz).    BP Readings from Last 3 Encounters:   06/16/25 124/74   05/12/25 120/64   03/24/25 134/74      Wt Readings from Last 3 Encounters:   06/16/25 82.7 kg (182 lb 6.4 oz)   05/12/25 82.1 kg (181 lb)   03/24/25 83.9 kg (185 lb)        PHYSICAL EXAM  Physical Exam  Constitutional:       Appearance: Normal appearance.   HENT:      Head: Normocephalic and atraumatic.      Ears:      Comments: Mild cerumen in left ear canal, no swelling or erythema of TM  Neck:      Comments: Fullness of thyroid gland  Cardiovascular:      Rate and Rhythm: Normal rate and regular rhythm.      Heart sounds: No murmur heard.  Pulmonary:      Effort: Pulmonary effort is normal. No respiratory distress.      Breath sounds: Normal breath sounds. No wheezing.   Abdominal:      General: There is no distension.      Palpations: Abdomen is soft.      Tenderness: There is no abdominal tenderness.   Musculoskeletal:      Cervical back: Tenderness present.      Right lower leg: No edema.      Left lower leg: No edema.   Lymphadenopathy:      Cervical: Cervical adenopathy present.   Neurological:      General: No focal deficit present.      Mental Status: She is alert and oriented to person, place, and time.   Psychiatric:         Mood and Affect: Mood normal.         Behavior: Behavior normal.         Thought Content: Thought " content normal.         Judgment: Judgment normal.          ASSESSMENT AND PLAN  Assessment/Plan   Problem List Items Addressed This Visit       Benign essential hypertension    Overview   Managed by cardiologist           Acquired hypothyroidism - Primary    Relevant Orders    Thyroid Stimulating Hormone    Fatigue    Relevant Orders    CBC and Auto Differential    Gricel-Barr Virus Antibody Panel    Mononucleosis screen    Comprehensive Metabolic Panel    Hypercholesterolemia    Adverse reaction to antihyperlipidemic drug    Type 2 diabetes mellitus with hyperglycemia, without long-term current use of insulin    Relevant Orders    Albumin-Creatinine Ratio, Urine Random    Hemoglobin A1c    Statin myopathy    Overview   - does not tolerate statins  - documented as statin myopathy to reflect population health requirements to exclude patient from measure, given that she cannot take the medication.          Other Visit Diagnoses         Neck swelling        Relevant Orders    US thyroid          DM2 - check A1c.  Well controlled per reported home monitoring.  - urine albumin/creatinine ratio 6/16/25  - BUN, Cr, GFR 6/16/25  - on ARB.  - does not tolerate statins     Hypertension - difficult to control, side effects with several meds.    - better controlled, continue current medication     Hypothyroidism - check TSH, adjust levothyroxine as necessary.     Fatigue, enlarged thyroid - patient reports thyroidectomy in 1987, however CT chest Sept 2024 has comment of normal appearing thyroid gland.  It appears that she still has thyroid tissue, therefore will check thyroid ultrasound for the fullness and left sided tenderness.  - given recent mono exposure, check CBC, CMP, heterophile antibody, and EBV panel.     Follow-up 6 months.              [1]   Social History  Tobacco Use   Smoking Status Never   Smokeless Tobacco Never

## 2025-06-17 DIAGNOSIS — E03.9 HYPOTHYROIDISM, UNSPECIFIED TYPE: ICD-10-CM

## 2025-06-17 LAB
ALBUMIN SERPL-MCNC: 4.9 G/DL (ref 3.6–5.1)
ALBUMIN/CREAT UR: ABNORMAL MG/G CREAT
ALP SERPL-CCNC: 106 U/L (ref 37–153)
ALT SERPL-CCNC: 22 U/L (ref 6–29)
ANION GAP SERPL CALCULATED.4IONS-SCNC: 10 MMOL/L (CALC) (ref 7–17)
AST SERPL-CCNC: 18 U/L (ref 10–35)
BASOPHILS # BLD AUTO: 72 CELLS/UL (ref 0–200)
BASOPHILS NFR BLD AUTO: 0.8 %
BILIRUB SERPL-MCNC: 0.8 MG/DL (ref 0.2–1.2)
BUN SERPL-MCNC: 14 MG/DL (ref 7–25)
CALCIUM SERPL-MCNC: 9.3 MG/DL (ref 8.6–10.4)
CHLORIDE SERPL-SCNC: 104 MMOL/L (ref 98–110)
CO2 SERPL-SCNC: 28 MMOL/L (ref 20–32)
CREAT SERPL-MCNC: 0.75 MG/DL (ref 0.5–1.05)
CREAT UR-MCNC: 11 MG/DL (ref 20–275)
EBV NA IGG SER IA-ACNC: 198 U/ML
EBV VCA IGG SER IA-ACNC: 307 U/ML
EBV VCA IGM SER IA-ACNC: <36 U/ML
EGFRCR SERPLBLD CKD-EPI 2021: 90 ML/MIN/1.73M2
EOSINOPHIL # BLD AUTO: 126 CELLS/UL (ref 15–500)
EOSINOPHIL NFR BLD AUTO: 1.4 %
ERYTHROCYTE [DISTWIDTH] IN BLOOD BY AUTOMATED COUNT: 12.8 % (ref 11–15)
EST. AVERAGE GLUCOSE BLD GHB EST-MCNC: 126 MG/DL
EST. AVERAGE GLUCOSE BLD GHB EST-SCNC: 7 MMOL/L
GLUCOSE SERPL-MCNC: 99 MG/DL (ref 65–99)
HBA1C MFR BLD: 6 %
HCT VFR BLD AUTO: 40.2 % (ref 35–45)
HETEROPH AB SER QL LA: NEGATIVE
HGB BLD-MCNC: 13 G/DL (ref 11.7–15.5)
LYMPHOCYTES # BLD AUTO: 1584 CELLS/UL (ref 850–3900)
LYMPHOCYTES NFR BLD AUTO: 17.6 %
MCH RBC QN AUTO: 29.9 PG (ref 27–33)
MCHC RBC AUTO-ENTMCNC: 32.3 G/DL (ref 32–36)
MCV RBC AUTO: 92.4 FL (ref 80–100)
MICROALBUMIN UR-MCNC: <0.2 MG/DL
MONOCYTES # BLD AUTO: 738 CELLS/UL (ref 200–950)
MONOCYTES NFR BLD AUTO: 8.2 %
NEUTROPHILS # BLD AUTO: 6480 CELLS/UL (ref 1500–7800)
NEUTROPHILS NFR BLD AUTO: 72 %
PLATELET # BLD AUTO: 234 THOUSAND/UL (ref 140–400)
PMV BLD REES-ECKER: 8.9 FL (ref 7.5–12.5)
POTASSIUM SERPL-SCNC: 4.5 MMOL/L (ref 3.5–5.3)
PROT SERPL-MCNC: 7 G/DL (ref 6.1–8.1)
QUEST EBV PANEL INTERPRETATION:: ABNORMAL
RBC # BLD AUTO: 4.35 MILLION/UL (ref 3.8–5.1)
SODIUM SERPL-SCNC: 142 MMOL/L (ref 135–146)
TSH SERPL-ACNC: 0.95 MIU/L (ref 0.4–4.5)
WBC # BLD AUTO: 9 THOUSAND/UL (ref 3.8–10.8)

## 2025-06-17 RX ORDER — LEVOTHYROXINE SODIUM 112 UG/1
112 TABLET ORAL DAILY
Qty: 90 TABLET | Refills: 3 | Status: SHIPPED | OUTPATIENT
Start: 2025-06-17 | End: 2026-06-17

## 2025-06-20 ENCOUNTER — TELEPHONE (OUTPATIENT)
Dept: PRIMARY CARE | Facility: CLINIC | Age: 65
End: 2025-06-20
Payer: COMMERCIAL

## 2025-06-20 DIAGNOSIS — Z98.62 S/P PERIPHERAL ARTERY ANGIOPLASTY: ICD-10-CM

## 2025-06-20 DIAGNOSIS — I70.218 ATHEROSCLEROSIS OF NATIVE ARTERIES OF EXTREMITIES WITH INTERMITTENT CLAUDICATION, OTHER EXTREMITY: Primary | ICD-10-CM

## 2025-06-20 DIAGNOSIS — I73.9 PERIPHERAL VASCULAR DISEASE, UNSPECIFIED: ICD-10-CM

## 2025-06-20 RX ORDER — ASPIRIN 81 MG/1
81 TABLET ORAL DAILY
Qty: 90 TABLET | Refills: 3 | Status: SHIPPED | OUTPATIENT
Start: 2025-06-20 | End: 2026-06-20

## 2025-06-20 NOTE — TELEPHONE ENCOUNTER
Per last OV note 3/24/25  Plan:  Claudication BUE, patient reports discoloration to bilateral and pain upper extremities limiting her daily activities. She is no s/p upper extremity peripheral angiogram with RUE IVUS-guided DAART of R axillary into brachial artery  with IVUS-guided DCB of R subclavian artery and LUE severe disease s/p IVUS guided DCB on 12/23/2024 with Dr. Campo. She reports after angiogram her symptoms have resolved.  ARIAN/TBI of upper extremity showed dampened waveforms BUE with diminished brachial pressures (1/24/2024).  Arterial duplex of upper extremities completed today showing is normal with normal bilateral wrist brachial index, right WRI-1.22 and left WRI-1.19.  Repeat CRP/ESR normal and renal function normal.  Continue DAPT with aspirin and Plavix x6 months and then aspirin indefinitely.

## 2025-06-20 NOTE — TELEPHONE ENCOUNTER
Patient called regarding Gricel Mccartney and Random Urine results.  Also, patient forgot to mention at her appt she gets debilitating muscle cramps.  Patient takes magnesium glycinate and uses magnesium gel.  Some days worse than others.  Patient does not eat a lot of salt.  Patient ate a hotdog yesterday and did not have cramps.  Patient asking if there is a prescription form.  Patient states her leg cramps are at night and go to the bone.  She also gets cramps in hands, arms, legs.  Patient Rheumatologist has her taking magnesium.  She takes 200 mg and 200 mg at night.  If she takes all 400 mg at once her stomach hurts.    Patient [phone]

## 2025-06-23 ENCOUNTER — HOSPITAL ENCOUNTER (OUTPATIENT)
Dept: RADIOLOGY | Facility: CLINIC | Age: 65
Discharge: HOME | End: 2025-06-23
Payer: COMMERCIAL

## 2025-06-23 DIAGNOSIS — R22.1 NECK SWELLING: ICD-10-CM

## 2025-06-23 PROCEDURE — 76536 US EXAM OF HEAD AND NECK: CPT

## 2025-06-23 PROCEDURE — 76536 US EXAM OF HEAD AND NECK: CPT | Performed by: RADIOLOGY

## 2025-06-25 LAB — LPA SERPL-SCNC: 47 NMOL/L

## 2025-06-26 ENCOUNTER — OFFICE VISIT (OUTPATIENT)
Dept: CARDIOLOGY | Facility: CLINIC | Age: 65
End: 2025-06-26
Payer: COMMERCIAL

## 2025-06-26 VITALS
HEIGHT: 66 IN | WEIGHT: 182 LBS | OXYGEN SATURATION: 99 % | HEART RATE: 75 BPM | BODY MASS INDEX: 29.25 KG/M2 | DIASTOLIC BLOOD PRESSURE: 74 MMHG | SYSTOLIC BLOOD PRESSURE: 130 MMHG

## 2025-06-26 DIAGNOSIS — R01.1 MURMUR: ICD-10-CM

## 2025-06-26 DIAGNOSIS — Z98.62 S/P PERIPHERAL ARTERY ANGIOPLASTY: ICD-10-CM

## 2025-06-26 DIAGNOSIS — I24.9 ACS (ACUTE CORONARY SYNDROME) (MULTI): ICD-10-CM

## 2025-06-26 DIAGNOSIS — E78.5 DYSLIPIDEMIA: ICD-10-CM

## 2025-06-26 DIAGNOSIS — E78.00 HYPERCHOLESTEROLEMIA: Primary | ICD-10-CM

## 2025-06-26 DIAGNOSIS — I77.1 SUBCLAVIAN ARTERIAL STENOSIS: ICD-10-CM

## 2025-06-26 DIAGNOSIS — M31.6 GIANT CELL ARTERITIS: ICD-10-CM

## 2025-06-26 DIAGNOSIS — I73.9 PERIPHERAL VASCULAR DISEASE, UNSPECIFIED: ICD-10-CM

## 2025-06-26 DIAGNOSIS — G43.809 OTHER MIGRAINE WITHOUT STATUS MIGRAINOSUS, NOT INTRACTABLE: ICD-10-CM

## 2025-06-26 DIAGNOSIS — I70.218 ATHEROSCLEROSIS OF NATIVE ARTERIES OF EXTREMITIES WITH INTERMITTENT CLAUDICATION, OTHER EXTREMITY: ICD-10-CM

## 2025-06-26 DIAGNOSIS — Z01.89 ENCOUNTER FOR OTHER SPECIFIED SPECIAL EXAMINATIONS: ICD-10-CM

## 2025-06-26 PROCEDURE — 99215 OFFICE O/P EST HI 40 MIN: CPT | Performed by: INTERNAL MEDICINE

## 2025-06-26 PROCEDURE — 4010F ACE/ARB THERAPY RXD/TAKEN: CPT | Performed by: INTERNAL MEDICINE

## 2025-06-26 PROCEDURE — 3008F BODY MASS INDEX DOCD: CPT | Performed by: INTERNAL MEDICINE

## 2025-06-26 PROCEDURE — 3078F DIAST BP <80 MM HG: CPT | Performed by: INTERNAL MEDICINE

## 2025-06-26 PROCEDURE — 3075F SYST BP GE 130 - 139MM HG: CPT | Performed by: INTERNAL MEDICINE

## 2025-06-26 PROCEDURE — 99212 OFFICE O/P EST SF 10 MIN: CPT | Performed by: INTERNAL MEDICINE

## 2025-06-26 NOTE — PATIENT INSTRUCTIONS
VISIT SUMMARY:  You came in for a follow-up after your intervention for bilateral upper extremity claudication. Your arms are currently asymptomatic, and we discussed your ongoing management for subclavian occlusion, dyslipidemia, hypertension, leg cramps, and left leg swelling.    YOUR PLAN:  BILATERAL SUBCLAVIAN ARTERY OCCLUSION: Your condition is stable post-intervention with normal ARIAN/TBI.  -Continue taking aspirin 81 mg daily.  -Continue taking Plavix.    DYSLIPIDEMIA: Your LDL is currently at 148 mg/dL, and Zetia alone is not sufficient to reach your target LDL levels.  -We will order a lipid panel with your next set of labs.  -We may discuss starting PCSK9 inhibitors if your LDL remains above 100 mg/dL.    HYPERTENSION: Your blood pressure is well-controlled with your current medications.  -Continue taking losartan.  -Continue taking amlodipine.    LEG CRAMPS: Your leg cramps are likely due to an electrolyte imbalance.  -Increase your dietary intake of potassium and magnesium-rich foods.  -Consider using electrolyte solutions like liquid IV or Gatorade.    LEFT LEG SWELLING: Your left leg swelling is likely due to non-thrombotic iliac vein compression.  -Elevate and compress your left leg to manage the swelling.

## 2025-06-26 NOTE — PROGRESS NOTES
PCP: Jaci Hernandez MD  VS: Mera  Rheum: Jael-Jesus    Subjective   History of Present Illness  Kimberley Murillo is a 64 year old female with subclavian occlusion, dyslipidemia, and hypertension who presents for follow-up after intervention for bilateral upper extremity claudication.    She has a history of left and right subclavian occlusion with previous intervention for bilateral upper extremity claudication. Her arms are currently asymptomatic. Prior to intervention, she experienced persistent claudication despite improved inflammation markers.    Her current medications include aspirin 81 mg, Plavix, losartan, Zetia, and amlodipine 10 mg. She actively manages her cholesterol by reducing beef intake. Her last LDL was 148 mg/dL, and she tolerates Zetia well.    She experiences significant leg cramps, particularly at night, which sometimes cause falls. She takes magnesium and drinks pickle juice to alleviate cramps. She has increased her intake of potassium-rich foods and maintains hydration.    She reports left leg swelling, possibly related to vein compression. Her family history includes heart disease, with her father having taken two statins daily and still experiencing a heart attack.    Review of Systems:  Otherwise, limited cardiovascular review of systems is negative.    Past Medical History:  She has a past medical history of Delayed emergence from general anesthesia (1987), Fibrocystic breast, Goiter (1987), Headache, unspecified, Hyperparathyroidism (Multi) (1987), Hypertension (2019), Inflammation of arteries (2024), Osteoarthritis (2024), Personal history of diseases of the blood and blood-forming organs and certain disorders involving the immune mechanism, Personal history of pneumonia (recurrent), PONV (postoperative nausea and vomiting) (1987), Spinal headache (1992), and Thyroid nodule (1987).    Surgical History:   She has a past surgical history that includes Other surgical history  "(11/03/2022); Other surgical history (11/03/2022); Other surgical history (11/03/2022); Other surgical history (11/03/2022); Other surgical history (11/03/2022); Other surgical history (11/03/2022); Other surgical history (11/03/2022); BI stereotactic guided breast right localization and biopsy (Right, 12/18/2023); Breast biopsy (Right); Invasive Vascular Procedure (Bilateral, 12/09/2024); Invasive Vascular Procedure (N/A, 12/09/2024); Invasive Vascular Procedure (N/A, 12/09/2024); Breast cyst excision; Invasive Vascular Procedure (Left, 12/23/2024); Invasive Vascular Procedure (N/A, 12/23/2024); and Invasive Vascular Procedure (N/A, 12/23/2024).    Family History:   Family History[1]Family History[2]    Social History:   Tobacco Use: Low Risk  (6/26/2025)    Patient History     Smoking Tobacco Use: Never     Smokeless Tobacco Use: Never     Passive Exposure: Not on file       Outpatient Medications:  Current Medications[3]     Allergies:  Meloxicam, Salsalate, Glimepiride, Hydralazine, Labetalol, Lisinopril, Statins-hmg-coa reductase inhibitors, Adhesive, Adhesive tape-silicones, and Latex       Objective   Vital Signs:  /74 (BP Location: Left arm, Patient Position: Sitting, BP Cuff Size: Adult)   Pulse 75   Ht 1.67 m (5' 5.75\")   Wt 82.6 kg (182 lb)   SpO2 99%   BMI 29.60 kg/m²     Physical Exam:  General: no acute distress  HEENT: EOMI  Lungs: Good air movement bilaterally with no wheezing/crackles/rhonchi  Cardiovascular: Regular rhythm with soft AS murmur; JVP is normal  No carotid bruits present  Abdomen: Soft  Extremities: 2+ radial pulse BUE, 1+ distal BLE pulses; trivial edema LLE.  Neurologic: Alert and oriented x3.    Pertinent Recent Cardiovascular Studies (personally reviewed):  Results  LABS  LDL: 148  Lipoprotein(a): 47 (06/23/2025)    Echo 5/15/25:  LVEF 60-65%, AV sclerosis    ARIAN/duplex BUE 3/24/25: normal indices and normal perfusion by duplex     Laboratory values:  CMP:  Recent Labs "     06/16/25  1019 03/24/25  0821 02/03/25  1204 12/17/24  1534 11/14/24  1148 08/14/24  0852 07/01/24  1308 06/04/24  1257 08/18/22  1159 07/25/22  0940    141 140 138 140 142 139 142   < > 141   K 4.5 4.0 3.9 3.9 3.8 3.8 3.8 3.3*   < > 3.8    105 106 101 101 104 102 106   < > 102   CO2 28 25 26 29 30 32 26 29   < > 29   ANIONGAP 10 11 8 12 13 10 15 10   < > 14   BUN 14 12 13 16 15 11 16 13   < > 9   CREATININE 0.75 0.72 0.77 1.07* 0.75 0.82 0.74 0.65   < > 0.74   EGFR 90 94 87 59* >90 81 >90 >90   < >  --    MG  --   --   --   --   --   --   --  2.00  --  2.09    < > = values in this interval not displayed.     Recent Labs     06/16/25  1019 03/24/25  0821 12/17/24  1534 08/14/24  0852 07/01/24  1308   ALBUMIN 4.9 4.6 4.6 4.3 4.8   ALKPHOS 106 92 68 73 59   ALT 22 28 27 35 25   AST 18 21 22 25 16   BILITOT 0.8 0.8 0.7 1.0 0.6     CBC:  Recent Labs     06/16/25  1019 03/24/25  0821 12/17/24  1534 11/14/24  1148 08/14/24  0852 07/01/24  1308 06/04/24  1257 05/14/24  0744   WBC 9.0 5.5 10.0 8.4 5.3 10.3 7.3 7.9   HGB 13.0 13.0 12.4 13.6 12.7 14.2 13.9 12.6   HCT 40.2 38.7 35.9* 41.0 39.0 41.6 41.0 39.7    224 264 278 217 321 223 209   MCV 92.4 88.8 86 88 92 91 95 95     COAG:   Recent Labs     12/09/24  0839   INR 0.9     HEME/ENDO:  Recent Labs     06/16/25  1021 12/05/24  0915 05/20/24  1542 12/29/23  1128 05/22/23  0951 07/25/22  0940   TSH 0.95 0.07*  --   --  1.50 9.39*   HGBA1C 6.0* 5.8* 6.9* 7.8* 6.6* 7.0*      CARDIAC:   Recent Labs     06/04/24  1440 06/04/24  1257   TROPHS 8 7     Recent Labs     02/03/25  1204 08/14/24  0852 05/14/24  0744   CHOL 237* 259* 256*   HDL 65 60.7 88.9   TRIG 120 196* 135     Lab Results   Component Value Date    LDLCALC 148 (H) 02/03/2025     MICRO:   Recent Labs     05/09/25  1548 02/03/25  1204 09/30/24  1518 08/14/24  0852 07/01/24  1308   CRP <3.0 <3.0 0.18 <0.10 0.21         I have personally reviewed most recent PCP, cardiology, vascular, and/or  podiatry documentation.      Assessment/Plan   Assessment & Plan  Bilateral subclavian artery occlusion  Stable post-intervention with normal ARIAN/TBI. Continued dual antiplatelet therapy discussed, emphasizing Plavix benefits over aspirin alone.  - Continue aspirin 81 mg daily.  - Continue Plavix.  - Follow up 1 year with EVLS with pre-ARIAN.    Dyslipidemia  LDL at 148 mg/dL. Zetia insufficient for target LDL. Discussed PCSK9 inhibitors as effective alternatives with minimal side effects.  - Order lipid panel with next set of labs.  - Discuss potential initiation of PCSK9 inhibitors if LDL remains above 100 mg/dL.    Hypertension  Well-controlled with losartan and amlodipine.  - Continue losartan.  - Continue amlodipine.    Leg cramps  Likely due to electrolyte imbalance. Limited relief from magnesium glycinate. Discussed dietary and supplemental electrolyte options.  - Increase dietary intake of potassium and magnesium-rich foods.  - Consider using electrolyte solutions like liquid IV or Gatorade.    Left leg swelling  Attributed to non-thrombotic iliac vein compression. Conservative management advised.  - Elevate and compress left leg to manage swelling.    As stated above, I have personally reviewed and interpreted the following labs:   - very elevated, discussion as above  Hgb 13 - normal, monitor  Cr 0.75 - normal, monitor    As stated above, I have personally reviewed and interpreted the following vascular studies:  Echo 5/15/25: LVEF 60-65%, AV sclerosis  ARIAN/duplex BUE 3/24/25: normal indices and normal perfusion by duplex          This medical note was created with the assistance of artificial intelligence (AI) for documentation purposes. The content has been reviewed and confirmed by the healthcare provider for accuracy and completeness. Patient consented to the use of audio recording and use of AI during their visit.       Theo Campo MD, FACC, FSCAI, RPVI  Co-Director, Vascular Center,  and  Co-Director, Pulmonary Embolism Response Team,  [facility] [place]                           Associate Professor of Medicine,                                                                [employer]             [1]   Family History  Problem Relation Name Age of Onset    Arthritis Mother Kelsey Zhong     Asthma Mother Kelsey Zhong     COPD Mother Kelsey Zhong     Diabetes Mother Kelsey Zhong     Hypertension Mother Kelsey Zhong     Kidney disease Mother Kelsey Zhong     Vision loss Mother Kelsey Zhong     Arthritis Father Nima Adilene Sr.     Diabetes Father Nima Adilene Sr.     Heart disease Father Nima Adilene Sr.     Hypertension Father Nima Adilene Sr.     Vision loss Father Nima Adilene Sr.     Birth defects Maternal Grandmother Fathers mother     Breast cancer Father's Sister Both Sisters     Learning disabilities Father's Brother Laith Head    [2]   Family History  Problem Relation Name Age of Onset    Arthritis Mother Kelsey Zhong     Asthma Mother Kelsey Zhong     COPD Mother Kelsey Zhong     Diabetes Mother Kelsey Zhong     Hypertension Mother Kelsey Zhong     Kidney disease Mother Kelsey Zhong     Vision loss Mother Kelsey Zhong     Arthritis Father Nima Adilene Sr.     Diabetes Father Nima Adilene Sr.     Heart disease Father Nima Adilene Sr.     Hypertension Father Niam Adilene Sr.     Vision loss Father Nima Adilene Sr.     Birth defects Maternal Grandmother Fathers mother     Breast cancer Father's Sister Both Sisters     Learning disabilities Father's Brother Laith Head    [3]   Current Outpatient Medications:     amLODIPine (Norvasc) 10 mg tablet, Take 1 tablet (10 mg) by mouth once daily., Disp: 90 tablet, Rfl: 3    aspirin 81 mg EC tablet, Take 1 tablet (81 mg) by mouth once daily., Disp: 90 tablet, Rfl: 3    blood-glucose sensor (FreeStyle Chava 3 Plus Sensor) device, Use to check blood sugar 4x daily (QACHS),  Disp: 3 each, Rfl: 11    clopidogrel (Plavix) 75 mg tablet, Take 1 tablet by mouth once daily, Disp: 30 tablet, Rfl: 11    ezetimibe (Zetia) 10 mg tablet, Take 1 tablet by mouth once daily, Disp: 30 tablet, Rfl: 11    losartan (Cozaar) 50 mg tablet, Take 1 tablet (50 mg) by mouth once daily., Disp: 30 tablet, Rfl: 3    Unithroid 112 mcg tablet, Take 1 tablet (112 mcg) by mouth early in the morning.. 30 TO 60 MINUTES BEFORE BREAKFAST. TAKE ON AN EMPTY STOMACH AT THE SAME TIME EACH DAY., Disp: 90 tablet, Rfl: 3    tocilizumab (Actemra ACTPen) 162 mg/0.9 mL, Inject 0.9 mL (162 mg) under the skin every 7 days., Disp: 12 each, Rfl: 3

## 2025-06-26 NOTE — LETTER
July 4, 2025     Jaci Hernandez MD  [address] [place] [id number]    Patient: Kimberley Murillo   YOB: 1960   Date of Visit: 6/26/2025       Dear Dr. Jaci Hernandez MD:    Thank you for referring Kimberley Murillo to me for evaluation. Below are my notes for this consultation.  If you have questions, please do not hesitate to call me. I look forward to following your patient along with you.       Sincerely,     Theo Campo MD      CC: Kelsey Lombardi MD  ______________________________________________________________________________________    PCP: Jaci Hernandez MD  VS: Mera  Rheum: Harjit    Subjective  History of Present Illness  Kimberley Murillo is a 64 year old female with subclavian occlusion, dyslipidemia, and hypertension who presents for follow-up after intervention for bilateral upper extremity claudication.    She has a history of left and right subclavian occlusion with previous intervention for bilateral upper extremity claudication. Her arms are currently asymptomatic. Prior to intervention, she experienced persistent claudication despite improved inflammation markers.    Her current medications include aspirin 81 mg, Plavix, losartan, Zetia, and amlodipine 10 mg. She actively manages her cholesterol by reducing beef intake. Her last LDL was 148 mg/dL, and she tolerates Zetia well.    She experiences significant leg cramps, particularly at night, which sometimes cause falls. She takes magnesium and drinks pickle juice to alleviate cramps. She has increased her intake of potassium-rich foods and maintains hydration.    She reports left leg swelling, possibly related to vein compression. Her family history includes heart disease, with her father having taken two statins daily and still experiencing a heart attack.    Review of Systems:  Otherwise, limited cardiovascular review of systems is negative.    Past Medical History:  She has a past medical history  "of Delayed emergence from general anesthesia (1987), Fibrocystic breast, Goiter (1987), Headache, unspecified, Hyperparathyroidism (Multi) (1987), Hypertension (2019), Inflammation of arteries (2024), Osteoarthritis (2024), Personal history of diseases of the blood and blood-forming organs and certain disorders involving the immune mechanism, Personal history of pneumonia (recurrent), PONV (postoperative nausea and vomiting) (1987), Spinal headache (1992), and Thyroid nodule (1987).    Surgical History:   She has a past surgical history that includes Other surgical history (11/03/2022); Other surgical history (11/03/2022); Other surgical history (11/03/2022); Other surgical history (11/03/2022); Other surgical history (11/03/2022); Other surgical history (11/03/2022); Other surgical history (11/03/2022); BI stereotactic guided breast right localization and biopsy (Right, 12/18/2023); Breast biopsy (Right); Invasive Vascular Procedure (Bilateral, 12/09/2024); Invasive Vascular Procedure (N/A, 12/09/2024); Invasive Vascular Procedure (N/A, 12/09/2024); Breast cyst excision; Invasive Vascular Procedure (Left, 12/23/2024); Invasive Vascular Procedure (N/A, 12/23/2024); and Invasive Vascular Procedure (N/A, 12/23/2024).    Family History:   Family History[1]Family History[2]    Social History:   Tobacco Use: Low Risk  (6/26/2025)    Patient History    • Smoking Tobacco Use: Never    • Smokeless Tobacco Use: Never    • Passive Exposure: Not on file       Outpatient Medications:  Current Medications[3]     Allergies:  Meloxicam, Salsalate, Glimepiride, Hydralazine, Labetalol, Lisinopril, Statins-hmg-coa reductase inhibitors, Adhesive, Adhesive tape-silicones, and Latex       Objective  Vital Signs:  /74 (BP Location: Left arm, Patient Position: Sitting, BP Cuff Size: Adult)   Pulse 75   Ht 1.67 m (5' 5.75\")   Wt 82.6 kg (182 lb)   SpO2 99%   BMI 29.60 kg/m²     Physical Exam:  General: no acute distress  HEENT: " EOMI  Lungs: Good air movement bilaterally with no wheezing/crackles/rhonchi  Cardiovascular: Regular rhythm with soft AS murmur; JVP is normal  No carotid bruits present  Abdomen: Soft  Extremities: 2+ radial pulse BUE, 1+ distal BLE pulses; trivial edema LLE.  Neurologic: Alert and oriented x3.    Pertinent Recent Cardiovascular Studies (personally reviewed):  Results  LABS  LDL: 148  Lipoprotein(a): 47 (06/23/2025)    Echo 5/15/25:  LVEF 60-65%, AV sclerosis    ARIAN/duplex BUE 3/24/25: normal indices and normal perfusion by duplex     Laboratory values:  CMP:  Recent Labs     06/16/25  1019 03/24/25  0821 02/03/25  1204 12/17/24  1534 11/14/24  1148 08/14/24  0852 07/01/24  1308 06/04/24  1257 08/18/22  1159 07/25/22  0940    141 140 138 140 142 139 142   < > 141   K 4.5 4.0 3.9 3.9 3.8 3.8 3.8 3.3*   < > 3.8    105 106 101 101 104 102 106   < > 102   CO2 28 25 26 29 30 32 26 29   < > 29   ANIONGAP 10 11 8 12 13 10 15 10   < > 14   BUN 14 12 13 16 15 11 16 13   < > 9   CREATININE 0.75 0.72 0.77 1.07* 0.75 0.82 0.74 0.65   < > 0.74   EGFR 90 94 87 59* >90 81 >90 >90   < >  --    MG  --   --   --   --   --   --   --  2.00  --  2.09    < > = values in this interval not displayed.     Recent Labs     06/16/25  1019 03/24/25  0821 12/17/24  1534 08/14/24  0852 07/01/24  1308   ALBUMIN 4.9 4.6 4.6 4.3 4.8   ALKPHOS 106 92 68 73 59   ALT 22 28 27 35 25   AST 18 21 22 25 16   BILITOT 0.8 0.8 0.7 1.0 0.6     CBC:  Recent Labs     06/16/25  1019 03/24/25  0821 12/17/24  1534 11/14/24  1148 08/14/24  0852 07/01/24  1308 06/04/24  1257 05/14/24  0744   WBC 9.0 5.5 10.0 8.4 5.3 10.3 7.3 7.9   HGB 13.0 13.0 12.4 13.6 12.7 14.2 13.9 12.6   HCT 40.2 38.7 35.9* 41.0 39.0 41.6 41.0 39.7    224 264 278 217 321 223 209   MCV 92.4 88.8 86 88 92 91 95 95     COAG:   Recent Labs     12/09/24  0839   INR 0.9     HEME/ENDO:  Recent Labs     06/16/25  1021 12/05/24  0915 05/20/24  1542 12/29/23  1128 05/22/23  0951  07/25/22  0940   TSH 0.95 0.07*  --   --  1.50 9.39*   HGBA1C 6.0* 5.8* 6.9* 7.8* 6.6* 7.0*      CARDIAC:   Recent Labs     06/04/24  1440 06/04/24  1257   TROPHS 8 7     Recent Labs     02/03/25  1204 08/14/24  0852 05/14/24  0744   CHOL 237* 259* 256*   HDL 65 60.7 88.9   TRIG 120 196* 135     Lab Results   Component Value Date    LDLCALC 148 (H) 02/03/2025     MICRO:   Recent Labs     05/09/25  1548 02/03/25  1204 09/30/24  1518 08/14/24  0852 07/01/24  1308   CRP <3.0 <3.0 0.18 <0.10 0.21         I have personally reviewed most recent PCP, cardiology, vascular, and/or podiatry documentation.      Assessment/Plan  Assessment & Plan  Bilateral subclavian artery occlusion  Stable post-intervention with normal ARIAN/TBI. Continued dual antiplatelet therapy discussed, emphasizing Plavix benefits over aspirin alone.  - Continue aspirin 81 mg daily.  - Continue Plavix.  - Follow up 1 year with EVLS with pre-ARIAN.    Dyslipidemia  LDL at 148 mg/dL. Zetia insufficient for target LDL. Discussed PCSK9 inhibitors as effective alternatives with minimal side effects.  - Order lipid panel with next set of labs.  - Discuss potential initiation of PCSK9 inhibitors if LDL remains above 100 mg/dL.    Hypertension  Well-controlled with losartan and amlodipine.  - Continue losartan.  - Continue amlodipine.    Leg cramps  Likely due to electrolyte imbalance. Limited relief from magnesium glycinate. Discussed dietary and supplemental electrolyte options.  - Increase dietary intake of potassium and magnesium-rich foods.  - Consider using electrolyte solutions like liquid IV or Gatorade.    Left leg swelling  Attributed to non-thrombotic iliac vein compression. Conservative management advised.  - Elevate and compress left leg to manage swelling.    As stated above, I have personally reviewed and interpreted the following labs:   - very elevated, discussion as above  Hgb 13 - normal, monitor  Cr 0.75 - normal, monitor    As stated  above, I have personally reviewed and interpreted the following vascular studies:  Echo 5/15/25: LVEF 60-65%, AV sclerosis  ARIAN/duplex BUE 3/24/25: normal indices and normal perfusion by duplex          This medical note was created with the assistance of artificial intelligence (AI) for documentation purposes. The content has been reviewed and confirmed by the healthcare provider for accuracy and completeness. Patient consented to the use of audio recording and use of AI during their visit.       Theo Campo MD, FACC, FSCAI, RPVI  Co-Director, Vascular Center, and  Co-Director, Pulmonary Embolism Response Team,  [facility] [place]                           Associate Professor of Medicine,                                                                [employer]             [1]  Family History  Problem Relation Name Age of Onset   • Arthritis Mother Kelsey Zhong    • Asthma Mother Kelsey Zhong    • COPD Mother Kelsey Zhong    • Diabetes Mother Kelsey Zhong    • Hypertension Mother Kelsey Zhong    • Kidney disease Mother Kelsey Zhong    • Vision loss Mother Kelsey Zhong    • Arthritis Father Nima Adilene Sr.    • Diabetes Father Nima Adilene Sr.    • Heart disease Father Nima Adilene Sr.    • Hypertension Father Nima Adilene Sr.    • Vision loss Father Nima Adilene Sr.    • Birth defects Maternal Grandmother Fathers mother    • Breast cancer Father's Sister Both Sisters    • Learning disabilities Father's Brother Laith Head    [2]  Family History  Problem Relation Name Age of Onset   • Arthritis Mother Kelsey Zhong    • Asthma Mother Kelsey Zhong    • COPD Mother Kelsey Zhong    • Diabetes Mother Kelsey Zhong    • Hypertension Mother Kelsey Zhong    • Kidney disease Mother Kelsey Zhong    • Vision loss Mother Kelsey Zhong    • Arthritis Father Nima Adilene Sr.    • Diabetes Father Nima Adilene Sr.    • Heart disease Father Nima Adilene Sr.     • Hypertension Father Nima Head Sr.    • Vision loss Father Nima Head Sr.    • Birth defects Maternal Grandmother Fathers mother    • Breast cancer Father's Sister Both Sisters    • Learning disabilities Father's Brother Laith Head    [3]    Current Outpatient Medications:   •  amLODIPine (Norvasc) 10 mg tablet, Take 1 tablet (10 mg) by mouth once daily., Disp: 90 tablet, Rfl: 3  •  aspirin 81 mg EC tablet, Take 1 tablet (81 mg) by mouth once daily., Disp: 90 tablet, Rfl: 3  •  blood-glucose sensor (FreeStyle Chava 3 Plus Sensor) device, Use to check blood sugar 4x daily (QACHS), Disp: 3 each, Rfl: 11  •  clopidogrel (Plavix) 75 mg tablet, Take 1 tablet by mouth once daily, Disp: 30 tablet, Rfl: 11  •  ezetimibe (Zetia) 10 mg tablet, Take 1 tablet by mouth once daily, Disp: 30 tablet, Rfl: 11  •  losartan (Cozaar) 50 mg tablet, Take 1 tablet (50 mg) by mouth once daily., Disp: 30 tablet, Rfl: 3  •  Unithroid 112 mcg tablet, Take 1 tablet (112 mcg) by mouth early in the morning.. 30 TO 60 MINUTES BEFORE BREAKFAST. TAKE ON AN EMPTY STOMACH AT THE SAME TIME EACH DAY., Disp: 90 tablet, Rfl: 3  •  tocilizumab (Actemra ACTPen) 162 mg/0.9 mL, Inject 0.9 mL (162 mg) under the skin every 7 days., Disp: 12 each, Rfl: 3       [1]  Family History  Problem Relation Name Age of Onset   • Arthritis Mother Kelsey Zhong    • Asthma Mother Kelsey Zhong    • COPD Mother Kelsey Zhong    • Diabetes Mother Kelsey Zhong    • Hypertension Mother Kelsey Zhong    • Kidney disease Mother Kelsey Zhong    • Vision loss Mother Kelsey Zhong    • Arthritis Father Nima Head Sr.    • Diabetes Father Nima Head Sr.    • Heart disease Father Nima Head Sr.    • Hypertension Father Nima Head Sr.    • Vision loss Father Nima Head Sr.    • Birth defects Maternal Grandmother Fathers mother    • Breast cancer Father's Sister Both Sisters    • Learning disabilities Father's Brother Laith Head    [2]  Family History  Problem Relation  Name Age of Onset   • Arthritis Mother Kelsey Zhong    • Asthma Mother Kelsey Zhong    • COPD Mother Kelsey Zhong    • Diabetes Mother Kelsey Zhong    • Hypertension Mother Kelsey Zhong    • Kidney disease Mother Kelsey Zhong    • Vision loss Mother Kelsey Zhong    • Arthritis Father Nima Head Sr.    • Diabetes Father Nima Head Sr.    • Heart disease Father Nima Head Sr.    • Hypertension Father Nima Head Sr.    • Vision loss Father Nima Head Sr.    • Birth defects Maternal Grandmother Fathers mother    • Breast cancer Father's Sister Both Sisters    • Learning disabilities Father's Brother Laith Head    [3]    Current Outpatient Medications:   •  amLODIPine (Norvasc) 10 mg tablet, Take 1 tablet (10 mg) by mouth once daily., Disp: 90 tablet, Rfl: 3  •  aspirin 81 mg EC tablet, Take 1 tablet (81 mg) by mouth once daily., Disp: 90 tablet, Rfl: 3  •  blood-glucose sensor (FreeStyle Chava 3 Plus Sensor) device, Use to check blood sugar 4x daily (QACHS), Disp: 3 each, Rfl: 11  •  clopidogrel (Plavix) 75 mg tablet, Take 1 tablet by mouth once daily, Disp: 30 tablet, Rfl: 11  •  ezetimibe (Zetia) 10 mg tablet, Take 1 tablet by mouth once daily, Disp: 30 tablet, Rfl: 11  •  losartan (Cozaar) 50 mg tablet, Take 1 tablet (50 mg) by mouth once daily., Disp: 30 tablet, Rfl: 3  •  Unithroid 112 mcg tablet, Take 1 tablet (112 mcg) by mouth early in the morning.. 30 TO 60 MINUTES BEFORE BREAKFAST. TAKE ON AN EMPTY STOMACH AT THE SAME TIME EACH DAY., Disp: 90 tablet, Rfl: 3  •  tocilizumab (Actemra ACTPen) 162 mg/0.9 mL, Inject 0.9 mL (162 mg) under the skin every 7 days., Disp: 12 each, Rfl: 3

## 2025-06-27 ENCOUNTER — TELEPHONE (OUTPATIENT)
Dept: PRIMARY CARE | Facility: CLINIC | Age: 65
End: 2025-06-27
Payer: COMMERCIAL

## 2025-06-27 DIAGNOSIS — E04.1 THYROID NODULE: Primary | ICD-10-CM

## 2025-08-01 ENCOUNTER — OFFICE VISIT (OUTPATIENT)
Dept: OTOLARYNGOLOGY | Facility: HOSPITAL | Age: 65
End: 2025-08-01
Payer: COMMERCIAL

## 2025-08-01 VITALS — BODY MASS INDEX: 30.3 KG/M2 | WEIGHT: 186.3 LBS

## 2025-08-01 DIAGNOSIS — M31.6 GIANT CELL ARTERITIS: ICD-10-CM

## 2025-08-01 DIAGNOSIS — E04.1 THYROID NODULE: ICD-10-CM

## 2025-08-01 PROCEDURE — 4010F ACE/ARB THERAPY RXD/TAKEN: CPT | Performed by: STUDENT IN AN ORGANIZED HEALTH CARE EDUCATION/TRAINING PROGRAM

## 2025-08-01 PROCEDURE — 31575 DIAGNOSTIC LARYNGOSCOPY: CPT | Performed by: STUDENT IN AN ORGANIZED HEALTH CARE EDUCATION/TRAINING PROGRAM

## 2025-08-01 PROCEDURE — 31575 DIAGNOSTIC LARYNGOSCOPY: CPT | Mod: 25 | Performed by: STUDENT IN AN ORGANIZED HEALTH CARE EDUCATION/TRAINING PROGRAM

## 2025-08-01 PROCEDURE — 99215 OFFICE O/P EST HI 40 MIN: CPT | Mod: 25 | Performed by: STUDENT IN AN ORGANIZED HEALTH CARE EDUCATION/TRAINING PROGRAM

## 2025-08-01 PROCEDURE — 99205 OFFICE O/P NEW HI 60 MIN: CPT | Performed by: STUDENT IN AN ORGANIZED HEALTH CARE EDUCATION/TRAINING PROGRAM

## 2025-08-01 NOTE — LETTER
August 3, 2025     Jaci Hernandez MD  [address] [place] [id number]    Patient: Kimberley Murillo   YOB: 1960   Date of Visit: 8/1/2025       Dear Dr. Jaci Hernandez MD:    Thank you for referring Kimberley Murillo to me for evaluation. Below are my notes for this consultation.  If you have questions, please do not hesitate to call me. I look forward to following your patient along with you.       Sincerely,     Melissa Lyon MD      CC: No Recipients  ______________________________________________________________________________________    HEAD AND NECK SURGERY CONSULT  [facility]    Referring Provider: Dr. Hernandez    HPI  I had the pleasure of seeing Kimberley Murillo as a consultation today for evaluation of two thyroid nodules found on imaging by Dr. Hernandez. She was concerned about neck pressure and subjective fullness, subsequent thyroid US showed nodules.     The patient reports a history of right hemithyroidectomy in 1987, pathology from this time was benign per patient. She is currently on levothyroxine. Her last TSH was normal.    Patient endorses ear pain and neck pressure and denies dysphagia, dysphonia, lumps or bumps of the neck, dyspnea, and throat pain. Denies personal history of radiation to the neck, family history of thyroid disease or cancer.     The patient denies having prior trauma or surgery to their head and neck. There is not a personal or family history of blood clots, easy bleeding or bruising, or anesthesia concerns. She takes Plavix and aspirin. The patient works for herself. She lives at home with her .      Tobacco use: The patient  reports that she has never smoked. She has never used smokeless tobacco.   Alcohol Use: The patient  reports no history of alcohol use.     Physical Examination  Vitals:  Wt 84.5 kg (186 lb 4.8 oz)   BMI 30.30 kg/m²   General: Examination reveals a well-developed, well-nourished patient in no apparent  distress. The patient has no audible dysphonia, stridor or airway distress.  The patient is oriented, alert and responsive.    Ears: Tms visualized bilaterally, EAC clear  Oral Cavity:  The patient is able to open the mouth widely without trismus.  The floor of mouth and oral tongue are soft, and no mucosal abnormalities are noted on the lips or within the oral cavity.  The oral tongue is fully mobile and midline on protrusion.  Good dentition.  Oropharynx: There are no mucosal abnormalities noted within the oropharynx.  The soft palate elevates symmetrically, the tonsils and base of tongue are normal to inspection and palpation.  Salivary glands: There are no palpable masses of the parotid or submandibular glands.   Neuro: Cranial nerves 2-12 are without obvious abnormality.  Neck: The neck is soft and symmetric. There is no palpable adenopathy. Thyroid soft and nontender. Surgical scar present on right anterior neck.     Procedure Note:  Diagnostic Flexible Laryngoscopy (18014)  Indication: patient symptoms requiring evaluation of pharyngeal/laryngeal/hypopharyngeal structures  Surgeon: Melissa Lyon MD  Informed Consent: The procedure, risks, and benefits were discussed with the patient and verbal consent obtained to proceed.   Procedure: Topical anesthesia (lidocaine) was used to spray the nasal mucosa.  A flexible laryngoscopy was inserted through the nasal cavity. The scope was then passed through the nasopharynx, oropharynx, and supraglottis. The vallecula, hypopharynx, supraglottis, glottis and subglottis were then visualized. The scope was then removed. The patient tolerated the procedure with no complications.     Findings: All of the visualized areas noted above were noted to be without evidence of lesions, ulcerations or masses.  The vocal folds adduct and abduct fully.     Attestation: I performed the procedure in its entirety, or was present with the resident/fellow for the entirety of the  procedure.        DATA REVIEWED:  Labs:  I reviewed relevant labs including TSH that was 0.95 which is within normal limits (patient on synthroid)  Lab Results   Component Value Date    WBC 9.0 06/16/2025    HGB 13.0 06/16/2025    HCT 40.2 06/16/2025     06/16/2025    NEUTROABS 7.04 12/17/2024     Lab Results   Component Value Date    TSH 0.95 06/16/2025    HGBA1C 6.0 (H) 06/16/2025        Radiology: I personally reviewed the thyroid ultrasound from 6/23/25 which demonstrated a left TR 4 nodule in the midzone measuring 2.4 x 1.5 x 1.2 cm and a left TR 2 nodule in the upper pole measuring 8 x 7 x 7 mm (right side surgically absent).    Chart review: I personally reviewed the patients chart including a clinic note by Jaci Hernandez MD (PCP) on 6/16/25 detailing work up of thyroid including ultrasound and ENT referral.     ASSESSMENT and PLAN:    Thyroid Nodule(s)  Acquired hypothyroidism  - Reviewed work up and treatment options, as well as exam findings today.  - Recommend FNA of 2.4 cm TR4 nodule on the left, as it meets criteria based on guidelines. This was ordered today  - Discussed briefly options including observation versus surgery for completion thyroid. Risks and benefits of surgery discussed including but not limited to pain, bleeding, infection, scar, need for further procedures, damage to surroundings tructures including the RLN, hypocalcemia, hypothyroidism and risks of anesthesia.   - currently taking synthroid  - I will call with biopsy results and to discuss next steps    Questions answered and she is in agreement with the plan  Melissa Lyon MD

## 2025-08-01 NOTE — PROGRESS NOTES
HEAD AND NECK SURGERY CONSULT  [facility]    Referring Provider: Dr. Hernandez    HPI  I had the pleasure of seeing Kimberley Murillo as a consultation today for evaluation of two thyroid nodules found on imaging by Dr. Hernandez. She was concerned about neck pressure and subjective fullness, subsequent thyroid US showed nodules.     The patient reports a history of right hemithyroidectomy in 1987, pathology from this time was benign per patient. She is currently on levothyroxine. Her last TSH was normal.    Patient endorses ear pain and neck pressure and denies dysphagia, dysphonia, lumps or bumps of the neck, dyspnea, and throat pain. Denies personal history of radiation to the neck, family history of thyroid disease or cancer.     The patient denies having prior trauma or surgery to their head and neck. There is not a personal or family history of blood clots, easy bleeding or bruising, or anesthesia concerns. She takes Plavix and aspirin. The patient works for herself. She lives at home with her .      Tobacco use: The patient  reports that she has never smoked. She has never used smokeless tobacco.   Alcohol Use: The patient  reports no history of alcohol use.     Physical Examination  Vitals:  Wt 84.5 kg (186 lb 4.8 oz)   BMI 30.30 kg/m²   General: Examination reveals a well-developed, well-nourished patient in no apparent distress. The patient has no audible dysphonia, stridor or airway distress.  The patient is oriented, alert and responsive.    Ears: Tms visualized bilaterally, EAC clear  Oral Cavity:  The patient is able to open the mouth widely without trismus.  The floor of mouth and oral tongue are soft, and no mucosal abnormalities are noted on the lips or within the oral cavity.  The oral tongue is fully mobile and midline on protrusion.  Good dentition.  Oropharynx: There are no mucosal abnormalities noted within the oropharynx.  The soft palate elevates symmetrically, the tonsils and  base of tongue are normal to inspection and palpation.  Salivary glands: There are no palpable masses of the parotid or submandibular glands.   Neuro: Cranial nerves 2-12 are without obvious abnormality.  Neck: The neck is soft and symmetric. There is no palpable adenopathy. Thyroid soft and nontender. Surgical scar present on right anterior neck.     Procedure Note:  Diagnostic Flexible Laryngoscopy (95522)  Indication: patient symptoms requiring evaluation of pharyngeal/laryngeal/hypopharyngeal structures  Surgeon: Melissa Lyon MD  Informed Consent: The procedure, risks, and benefits were discussed with the patient and verbal consent obtained to proceed.   Procedure: Topical anesthesia (lidocaine) was used to spray the nasal mucosa.  A flexible laryngoscopy was inserted through the nasal cavity. The scope was then passed through the nasopharynx, oropharynx, and supraglottis. The vallecula, hypopharynx, supraglottis, glottis and subglottis were then visualized. The scope was then removed. The patient tolerated the procedure with no complications.     Findings: All of the visualized areas noted above were noted to be without evidence of lesions, ulcerations or masses.  The vocal folds adduct and abduct fully.     Attestation: I performed the procedure in its entirety, or was present with the resident/fellow for the entirety of the procedure.        DATA REVIEWED:  Labs:  I reviewed relevant labs including TSH that was 0.95 which is within normal limits (patient on synthroid)  Lab Results   Component Value Date    WBC 9.0 06/16/2025    HGB 13.0 06/16/2025    HCT 40.2 06/16/2025     06/16/2025    NEUTROABS 7.04 12/17/2024     Lab Results   Component Value Date    TSH 0.95 06/16/2025    HGBA1C 6.0 (H) 06/16/2025        Radiology: I personally reviewed the thyroid ultrasound from 6/23/25 which demonstrated a left TR 4 nodule in the midzone measuring 2.4 x 1.5 x 1.2 cm and a left TR 2 nodule in the upper  pole measuring 8 x 7 x 7 mm (right side surgically absent).    Chart review: I personally reviewed the patients chart including a clinic note by Jaci Hernandez MD (PCP) on 6/16/25 detailing work up of thyroid including ultrasound and ENT referral.     ASSESSMENT and PLAN:    Thyroid Nodule(s)  Acquired hypothyroidism  - Reviewed work up and treatment options, as well as exam findings today.  - Recommend FNA of 2.4 cm TR4 nodule on the left, as it meets criteria based on guidelines. This was ordered today  - Discussed briefly options including observation versus surgery for completion thyroid. Risks and benefits of surgery discussed including but not limited to pain, bleeding, infection, scar, need for further procedures, damage to surroundings tructures including the RLN, hypocalcemia, hypothyroidism and risks of anesthesia.   - currently taking synthroid  - I will call with biopsy results and to discuss next steps    Questions answered and she is in agreement with the plan  Melissa Lyon MD

## 2025-08-12 LAB
CHOLEST SERPL-MCNC: 190 MG/DL
CHOLEST/HDLC SERPL: 3 (CALC)
CRP SERPL-MCNC: 12.4 MG/L
ERYTHROCYTE [SEDIMENTATION RATE] IN BLOOD BY WESTERGREN METHOD: 17 MM/H
HDLC SERPL-MCNC: 63 MG/DL
LDLC SERPL CALC-MCNC: 108 MG/DL (CALC)
NONHDLC SERPL-MCNC: 127 MG/DL (CALC)
TRIGL SERPL-MCNC: 99 MG/DL

## 2025-08-14 ENCOUNTER — APPOINTMENT (OUTPATIENT)
Dept: RHEUMATOLOGY | Facility: CLINIC | Age: 65
End: 2025-08-14
Payer: COMMERCIAL

## 2025-08-14 VITALS
SYSTOLIC BLOOD PRESSURE: 128 MMHG | DIASTOLIC BLOOD PRESSURE: 66 MMHG | HEART RATE: 65 BPM | WEIGHT: 184 LBS | HEIGHT: 66 IN | BODY MASS INDEX: 29.57 KG/M2 | OXYGEN SATURATION: 98 % | RESPIRATION RATE: 14 BRPM

## 2025-08-14 DIAGNOSIS — M31.6 GIANT CELL ARTERITIS: Primary | ICD-10-CM

## 2025-08-14 PROCEDURE — 99214 OFFICE O/P EST MOD 30 MIN: CPT | Performed by: INTERNAL MEDICINE

## 2025-08-14 PROCEDURE — 3078F DIAST BP <80 MM HG: CPT | Performed by: INTERNAL MEDICINE

## 2025-08-14 PROCEDURE — 3074F SYST BP LT 130 MM HG: CPT | Performed by: INTERNAL MEDICINE

## 2025-08-14 PROCEDURE — 4010F ACE/ARB THERAPY RXD/TAKEN: CPT | Performed by: INTERNAL MEDICINE

## 2025-08-14 PROCEDURE — 3008F BODY MASS INDEX DOCD: CPT | Performed by: INTERNAL MEDICINE

## 2025-08-14 ASSESSMENT — PATIENT HEALTH QUESTIONNAIRE - PHQ9
2. FEELING DOWN, DEPRESSED OR HOPELESS: NOT AT ALL
1. LITTLE INTEREST OR PLEASURE IN DOING THINGS: NOT AT ALL
SUM OF ALL RESPONSES TO PHQ9 QUESTIONS 1 AND 2: 0

## 2025-08-14 ASSESSMENT — PAIN SCALES - GENERAL: PAINLEVEL_OUTOF10: 0-NO PAIN

## 2025-08-18 ENCOUNTER — RESULTS FOLLOW-UP (OUTPATIENT)
Dept: CARDIOLOGY | Facility: HOSPITAL | Age: 65
End: 2025-08-18
Payer: COMMERCIAL

## 2025-08-21 ENCOUNTER — HOSPITAL ENCOUNTER (OUTPATIENT)
Dept: RADIOLOGY | Facility: HOSPITAL | Age: 65
Discharge: HOME | End: 2025-08-21
Payer: COMMERCIAL

## 2025-08-21 ENCOUNTER — DOCUMENTATION (OUTPATIENT)
Dept: OTOLARYNGOLOGY | Facility: HOSPITAL | Age: 65
End: 2025-08-21
Payer: COMMERCIAL

## 2025-08-21 VITALS
HEART RATE: 76 BPM | OXYGEN SATURATION: 99 % | RESPIRATION RATE: 16 BRPM | SYSTOLIC BLOOD PRESSURE: 163 MMHG | DIASTOLIC BLOOD PRESSURE: 73 MMHG | TEMPERATURE: 98.5 F

## 2025-08-21 DIAGNOSIS — E04.1 THYROID NODULE: ICD-10-CM

## 2025-08-21 PROCEDURE — 10005 FNA BX W/US GDN 1ST LES: CPT | Mod: 52

## 2025-08-21 RX ORDER — ASPIRIN 81 MG/1
81 TABLET ORAL DAILY
COMMUNITY

## 2025-08-21 ASSESSMENT — PAIN - FUNCTIONAL ASSESSMENT
PAIN_FUNCTIONAL_ASSESSMENT: 0-10
PAIN_FUNCTIONAL_ASSESSMENT: 0-10

## 2025-08-21 ASSESSMENT — PAIN SCALES - GENERAL
PAINLEVEL_OUTOF10: 0 - NO PAIN
PAINLEVEL_OUTOF10: 0 - NO PAIN

## 2025-08-25 ENCOUNTER — TELEPHONE (OUTPATIENT)
Dept: OTOLARYNGOLOGY | Facility: HOSPITAL | Age: 65
End: 2025-08-25
Payer: COMMERCIAL

## 2025-08-29 LAB
CRP SERPL-MCNC: 10.3 MG/L
ERYTHROCYTE [SEDIMENTATION RATE] IN BLOOD BY WESTERGREN METHOD: 22 MM/H

## 2025-09-04 ENCOUNTER — APPOINTMENT (OUTPATIENT)
Dept: CARDIOLOGY | Facility: CLINIC | Age: 65
End: 2025-09-04
Payer: COMMERCIAL

## 2025-11-20 ENCOUNTER — APPOINTMENT (OUTPATIENT)
Dept: RHEUMATOLOGY | Facility: CLINIC | Age: 65
End: 2025-11-20
Payer: COMMERCIAL

## 2025-12-16 ENCOUNTER — APPOINTMENT (OUTPATIENT)
Dept: PRIMARY CARE | Facility: CLINIC | Age: 65
End: 2025-12-16
Payer: COMMERCIAL

## (undated) DEVICE — COTTON BALL, DOUBLE STRING, LARGE

## (undated) DEVICE — Device

## (undated) DEVICE — TR BAND, RADIAL COMPRESSION, STANDARD, 24CM

## (undated) DEVICE — DRESSING, NON-ADHERENT, TELFA, OUCHLESS, 3 X 8 IN, STERILE

## (undated) DEVICE — HAWKONE CUTTER DRIVER

## (undated) DEVICE — KIT, STANDARD, LEFT HEART, WITH MANIFOLD

## (undated) DEVICE — CATHETER, NAVICROSS, 0.035 X 150CM, ANGLED TIP

## (undated) DEVICE — CATHETER, SUPPORT, TRAILBLAZER, 4FR X 150CM, OTW, SINGLE LUMEN, TAPER

## (undated) DEVICE — CATHETER, NANOCROSS, .014, 2MM X 120MM, 150CM

## (undated) DEVICE — DEPOT, BAG 1400ML, 48IN LG BORE, AIRLESS MALE, MACRO DRIP

## (undated) DEVICE — CATHETER, CXI SUPPORT, .018 X 150CM, STR TIP

## (undated) DEVICE — ACCESS KIT, S-MAK MINI, 4FR 10CM 0.018IN 40CM, NT/PT, ECHO ENHANCE NEEDLE

## (undated) DEVICE — POSITIONER, RETENTION SYSTEM, PANNUS, 2 PADS 1 STRAP, NS

## (undated) DEVICE — SHIELD, PERIPHERAL, RADPAD, 11 X 34IN, W/ ABSORBENT, ORANGE, STERILE

## (undated) DEVICE — CATHETER, BALLOON, CHOCOLATE, 4.0MM X 40MM, 135CM, OTW PTA

## (undated) DEVICE — SHEATH, GLIDESHEATH, SLENDER, 6FR 10CM

## (undated) DEVICE — ATHERECTOMY SYSTEM, HAWKONE HM 1, 6FR

## (undated) DEVICE — CATHETER, EAGLE EYE, PLATNIUM (IVUS)

## (undated) DEVICE — SLEEVE, VASO PRESS, CALF GARMENT, MEDIUM, GREEN

## (undated) DEVICE — GUIDEWIRE, HI-TORQUE, COMMAND ES, 0.014 X 300CM

## (undated) DEVICE — WOUND SYSTEM, DEBRIDEMENT & CLEANING, O.R DUOPAK

## (undated) DEVICE — DELIVERY WIRE, BAREWIRE, FILTER, WORKHORSE, 0.035 X 315CM

## (undated) DEVICE — SHEATH, PINNACLE, W/.038 GW 10CM, 5FR INTRODUCER, 2.5 CM DIALATOR

## (undated) DEVICE — GUIDEWIRE, HI-TORQUE, VERSACORE, 300CM, MODIFIED J

## (undated) DEVICE — GEL, ULTRASOUND, AQUASONIC 100, 20 GM, STERILE

## (undated) DEVICE — CATHETER, ANGIO 5FR 65CM, STRT FLUSH DIAGNOSTIC TEMPO

## (undated) DEVICE — CATHETER, BALLOON, INPACT AV, DCB 018 5.0 MM X 60 MM X 130 CM

## (undated) DEVICE — PROTECTION SYSTEM, EMBOSHIELD NAV6, LARGE VESSEL 7.2MM

## (undated) DEVICE — DEVICE, CLOSURE, PERCLOSE, PROSTYLE

## (undated) DEVICE — ADHESIVE, SKIN, LIQUIBAND EXCEED

## (undated) DEVICE — CATHETER, ANGIO, IMPULSE, FR4, 5 FR X 125 CM

## (undated) DEVICE — KIT, MANIFOLD, PARMA

## (undated) DEVICE — GUIDEWIRE, SPARTA CORE,  .014 X 300, 5CM

## (undated) DEVICE — DEVICE KIT, INFLATION, CUSTOM, PARMA